# Patient Record
Sex: MALE | Race: WHITE | NOT HISPANIC OR LATINO | Employment: OTHER | URBAN - METROPOLITAN AREA
[De-identification: names, ages, dates, MRNs, and addresses within clinical notes are randomized per-mention and may not be internally consistent; named-entity substitution may affect disease eponyms.]

---

## 2017-01-05 ENCOUNTER — GENERIC CONVERSION - ENCOUNTER (OUTPATIENT)
Dept: OTHER | Facility: OTHER | Age: 47
End: 2017-01-05

## 2017-03-02 ENCOUNTER — ALLSCRIPTS OFFICE VISIT (OUTPATIENT)
Dept: OTHER | Facility: OTHER | Age: 47
End: 2017-03-02

## 2017-03-02 DIAGNOSIS — D72.819 DECREASED WHITE BLOOD CELL COUNT: ICD-10-CM

## 2017-03-02 DIAGNOSIS — E78.5 HYPERLIPIDEMIA: ICD-10-CM

## 2017-03-02 DIAGNOSIS — R22.1 LOCALIZED SWELLING, MASS OR LUMP OF NECK: ICD-10-CM

## 2017-03-02 DIAGNOSIS — I10 ESSENTIAL (PRIMARY) HYPERTENSION: ICD-10-CM

## 2017-03-21 ENCOUNTER — HOSPITAL ENCOUNTER (EMERGENCY)
Facility: HOSPITAL | Age: 47
Discharge: HOME | End: 2017-03-21
Attending: EMERGENCY MEDICINE
Payer: COMMERCIAL

## 2017-03-21 ENCOUNTER — ALLSCRIPTS OFFICE VISIT (OUTPATIENT)
Dept: OTHER | Facility: OTHER | Age: 47
End: 2017-03-21

## 2017-03-21 VITALS
WEIGHT: 240 LBS | DIASTOLIC BLOOD PRESSURE: 65 MMHG | SYSTOLIC BLOOD PRESSURE: 127 MMHG | TEMPERATURE: 98.4 F | OXYGEN SATURATION: 96 % | RESPIRATION RATE: 18 BRPM | HEART RATE: 76 BPM

## 2017-03-21 DIAGNOSIS — M62.838 MUSCLE SPASM: Primary | ICD-10-CM

## 2017-03-21 DIAGNOSIS — M79.2 RADICULAR PAIN IN RIGHT ARM: ICD-10-CM

## 2017-03-21 LAB
SPECIMEN SOURCE: NORMAL
TROPONIN I BLD-MCNC: 0 NG/ML (ref 0–0.08)

## 2017-03-21 PROCEDURE — 93005 ELECTROCARDIOGRAM TRACING: CPT | Performed by: EMERGENCY MEDICINE

## 2017-03-21 PROCEDURE — 84484 ASSAY OF TROPONIN QUANT: CPT

## 2017-03-21 PROCEDURE — 99284 EMERGENCY DEPT VISIT MOD MDM: CPT

## 2017-03-21 PROCEDURE — 96372 THER/PROPH/DIAG INJ SC/IM: CPT

## 2017-03-21 PROCEDURE — 93005 ELECTROCARDIOGRAM TRACING: CPT

## 2017-03-21 RX ORDER — KETOROLAC TROMETHAMINE 30 MG/ML
15 INJECTION, SOLUTION INTRAMUSCULAR; INTRAVENOUS ONCE
Status: COMPLETED | OUTPATIENT
Start: 2017-03-21 | End: 2017-03-21

## 2017-03-21 RX ORDER — LIDOCAINE 50 MG/G
1 PATCH TOPICAL EVERY 24 HOURS
Qty: 7 PATCH | Refills: 0 | Status: SHIPPED | OUTPATIENT
Start: 2017-03-21 | End: 2018-11-06 | Stop reason: ALTCHOICE

## 2017-03-21 RX ORDER — LOSARTAN POTASSIUM 25 MG/1
25 TABLET ORAL DAILY
COMMUNITY
End: 2018-03-24 | Stop reason: SDUPTHER

## 2017-03-21 RX ORDER — LIDOCAINE 50 MG/G
1 PATCH TOPICAL ONCE
Status: DISCONTINUED | OUTPATIENT
Start: 2017-03-21 | End: 2017-03-21 | Stop reason: HOSPADM

## 2017-03-21 RX ADMIN — HYDROMORPHONE HYDROCHLORIDE 1 MG: 1 INJECTION, SOLUTION INTRAMUSCULAR; INTRAVENOUS; SUBCUTANEOUS at 14:51

## 2017-03-21 RX ADMIN — LIDOCAINE 1 PATCH: 50 PATCH CUTANEOUS at 15:53

## 2017-03-21 RX ADMIN — KETOROLAC TROMETHAMINE 15 MG: 30 INJECTION, SOLUTION INTRAMUSCULAR at 14:50

## 2017-03-22 ENCOUNTER — GENERIC CONVERSION - ENCOUNTER (OUTPATIENT)
Dept: OTHER | Facility: OTHER | Age: 47
End: 2017-03-22

## 2017-03-22 LAB
ATRIAL RATE: 79 BPM
P AXIS: 58 DEGREES
PR INTERVAL: 154 MS
QRS AXIS: 24 DEGREES
QRSD INTERVAL: 114 MS
QT INTERVAL: 368 MS
QTC INTERVAL: 421 MS
T WAVE AXIS: 16 DEGREES
VENTRICULAR RATE: 79 BPM

## 2017-03-23 ENCOUNTER — GENERIC CONVERSION - ENCOUNTER (OUTPATIENT)
Dept: OTHER | Facility: OTHER | Age: 47
End: 2017-03-23

## 2017-05-12 ENCOUNTER — APPOINTMENT (OUTPATIENT)
Dept: LAB | Facility: HOSPITAL | Age: 47
End: 2017-05-12
Payer: COMMERCIAL

## 2017-05-12 ENCOUNTER — HOSPITAL ENCOUNTER (OUTPATIENT)
Dept: ULTRASOUND IMAGING | Facility: HOSPITAL | Age: 47
Discharge: HOME | End: 2017-05-12
Payer: COMMERCIAL

## 2017-05-12 ENCOUNTER — GENERIC CONVERSION - ENCOUNTER (OUTPATIENT)
Dept: OTHER | Facility: OTHER | Age: 47
End: 2017-05-12

## 2017-05-12 DIAGNOSIS — D72.819 DECREASED WHITE BLOOD CELL COUNT: ICD-10-CM

## 2017-05-12 DIAGNOSIS — R22.1 LOCALIZED SWELLING, MASS OR LUMP OF NECK: ICD-10-CM

## 2017-05-12 DIAGNOSIS — I10 ESSENTIAL (PRIMARY) HYPERTENSION: ICD-10-CM

## 2017-05-12 DIAGNOSIS — E78.5 HYPERLIPIDEMIA: ICD-10-CM

## 2017-05-12 LAB
ALBUMIN SERPL BCP-MCNC: 4.1 G/DL (ref 3.5–5)
ALP SERPL-CCNC: 60 U/L (ref 46–116)
ALT SERPL W P-5'-P-CCNC: 33 U/L (ref 12–78)
ANION GAP SERPL CALCULATED.3IONS-SCNC: 3 MMOL/L (ref 4–13)
AST SERPL W P-5'-P-CCNC: 19 U/L (ref 5–45)
BASOPHILS # BLD AUTO: 0.02 THOUSANDS/ΜL (ref 0–0.1)
BASOPHILS NFR BLD AUTO: 0 % (ref 0–1)
BILIRUB SERPL-MCNC: 0.38 MG/DL (ref 0.2–1)
BUN SERPL-MCNC: 12 MG/DL (ref 5–25)
CALCIUM SERPL-MCNC: 9 MG/DL (ref 8.3–10.1)
CHLORIDE SERPL-SCNC: 103 MMOL/L (ref 100–108)
CO2 SERPL-SCNC: 34 MMOL/L (ref 21–32)
CREAT SERPL-MCNC: 1.1 MG/DL (ref 0.6–1.3)
EOSINOPHIL # BLD AUTO: 0.08 THOUSAND/ΜL (ref 0–0.61)
EOSINOPHIL NFR BLD AUTO: 2 % (ref 0–6)
ERYTHROCYTE [DISTWIDTH] IN BLOOD BY AUTOMATED COUNT: 12.3 % (ref 11.6–15.1)
GFR SERPL CREATININE-BSD FRML MDRD: >60 ML/MIN/1.73SQ M
GLUCOSE P FAST SERPL-MCNC: 95 MG/DL (ref 65–99)
HCT VFR BLD AUTO: 44.5 % (ref 36.5–49.3)
HGB BLD-MCNC: 15.7 G/DL (ref 12–17)
LYMPHOCYTES # BLD AUTO: 1.74 THOUSANDS/ΜL (ref 0.6–4.47)
LYMPHOCYTES NFR BLD AUTO: 37 % (ref 14–44)
MCH RBC QN AUTO: 30.6 PG (ref 26.8–34.3)
MCHC RBC AUTO-ENTMCNC: 35.3 G/DL (ref 31.4–37.4)
MCV RBC AUTO: 87 FL (ref 82–98)
MONOCYTES # BLD AUTO: 0.41 THOUSAND/ΜL (ref 0.17–1.22)
MONOCYTES NFR BLD AUTO: 9 % (ref 4–12)
NEUTROPHILS # BLD AUTO: 2.52 THOUSANDS/ΜL (ref 1.85–7.62)
NEUTS SEG NFR BLD AUTO: 52 % (ref 43–75)
NRBC BLD AUTO-RTO: 0 /100 WBCS
PLATELET # BLD AUTO: 178 THOUSANDS/UL (ref 149–390)
PMV BLD AUTO: 10.9 FL (ref 8.9–12.7)
POTASSIUM SERPL-SCNC: 4.3 MMOL/L (ref 3.5–5.3)
PROT SERPL-MCNC: 7.4 G/DL (ref 6.4–8.2)
RBC # BLD AUTO: 5.13 MILLION/UL (ref 3.88–5.62)
SODIUM SERPL-SCNC: 140 MMOL/L (ref 136–145)
WBC # BLD AUTO: 4.77 THOUSAND/UL (ref 4.31–10.16)

## 2017-05-12 PROCEDURE — 80053 COMPREHEN METABOLIC PANEL: CPT

## 2017-05-12 PROCEDURE — 85025 COMPLETE CBC W/AUTO DIFF WBC: CPT

## 2017-05-12 PROCEDURE — 76536 US EXAM OF HEAD AND NECK: CPT

## 2017-05-12 PROCEDURE — 36415 COLL VENOUS BLD VENIPUNCTURE: CPT

## 2017-05-17 ENCOUNTER — GENERIC CONVERSION - ENCOUNTER (OUTPATIENT)
Dept: OTHER | Facility: OTHER | Age: 47
End: 2017-05-17

## 2017-05-19 ENCOUNTER — GENERIC CONVERSION - ENCOUNTER (OUTPATIENT)
Dept: OTHER | Facility: OTHER | Age: 47
End: 2017-05-19

## 2017-06-02 ENCOUNTER — GENERIC CONVERSION - ENCOUNTER (OUTPATIENT)
Dept: OTHER | Facility: OTHER | Age: 47
End: 2017-06-02

## 2017-06-05 ENCOUNTER — GENERIC CONVERSION - ENCOUNTER (OUTPATIENT)
Dept: OTHER | Facility: OTHER | Age: 47
End: 2017-06-05

## 2017-07-26 ENCOUNTER — GENERIC CONVERSION - ENCOUNTER (OUTPATIENT)
Dept: OTHER | Facility: OTHER | Age: 47
End: 2017-07-26

## 2017-12-21 ENCOUNTER — ALLSCRIPTS OFFICE VISIT (OUTPATIENT)
Dept: OTHER | Facility: OTHER | Age: 47
End: 2017-12-21

## 2017-12-21 DIAGNOSIS — Z12.5 ENCOUNTER FOR SCREENING FOR MALIGNANT NEOPLASM OF PROSTATE: ICD-10-CM

## 2017-12-21 DIAGNOSIS — G44.029 CHRONIC CLUSTER HEADACHE, NOT INTRACTABLE: ICD-10-CM

## 2017-12-21 DIAGNOSIS — I10 ESSENTIAL (PRIMARY) HYPERTENSION: ICD-10-CM

## 2017-12-21 DIAGNOSIS — R31.29 OTHER MICROSCOPIC HEMATURIA: ICD-10-CM

## 2017-12-21 DIAGNOSIS — E78.5 HYPERLIPIDEMIA: ICD-10-CM

## 2017-12-21 DIAGNOSIS — E34.9 ENDOCRINE DISORDER: ICD-10-CM

## 2017-12-23 NOTE — PROGRESS NOTES
Assessment   1  Chronic cluster headache, not intractable (339 02) (G44 029)    Plan   Chronic cluster headache, not intractable    · PredniSONE 20 MG Oral Tablet; Take 3 tablets in the morning  Chronic cluster headache, not intractable, Hyperlipidemia, Hypertension, Low    testosterone    · (1) CBC/PLT/DIFF; Status:Active; Requested for:57Fbz8511;    · (1) COMPREHENSIVE METABOLIC PANEL; Status:Active; Requested for:87Evj4938;    · (1) LIPID PANEL FASTING W DIRECT LDL REFLEX; Status:Active; Requested    for:77Vyt6048;    · (1) TSH; Status:Active; Requested for:45Rkh9360;     Discussion/Summary      1 : Cluster headache: Recommend prednisone burst, 20 mg, 3 pills daily for 5 days  Re-evaluate after that  If the headache continued, would recommend MRI, and further evaluation  Chief Complaint   C/o- Two nights ago pt states he was with spouse and felt some type of pressure on neck and really bad headache  [facility]      History of Present Illness   HPI: 48 y/o male presents c/o headache that started 2 days ago during intercourse  The pain started suddenly at the left base of head and radiates to the back of the left eye  It seems to be throbbing and waxing and waning in nature  He has never had anything like this before  Denies vision changes, n/v, weakness, numbness, tingling, paresthesia, loss of consciousness  Review of Systems        Constitutional: no fever or chills, feels well, no tiredness, no recent weight loss or weight gain  ENT: no complaints of earache, no loss of hearing, no nosebleeds or nasal discharge, no sore throat or hoarseness  Cardiovascular: no complaints of slow or fast heart rate, no chest pain, no palpitations, no leg claudication or lower extremity edema  Genitourinary: as noted in HPI  Musculoskeletal: no complaints of arthralgia, no myalgia, no joint swelling or stiffness, no limb pain or swelling  Neurological: as noted in HPI  Active Problems   1   Acute right-sided thoracic back pain (724 1) (M54 6)   2  Allergic rhinitis (477 9) (J30 9)   3  Atypical chest pain (786 59) (R07 89)   4  Blood in stool (578 1) (K92 1)   5  Cut of hand (882 0) (S61 409A)   6  Diarrhea (787 91) (R19 7)   7  Encounter for prostate cancer screening (V76 44) (Z12 5)   8  Erectile dysfunction of non-organic origin (302 72) (F52 21)   9  Ganglion cyst of wrist (727 41) (M67 439)   10  Hyperlipidemia (272 4) (E78 5)   11  Hypertension (401 9) (I10)   12  Intractable pain (780 96) (R52)   13  Leukopenia (288 50) (D72 819)   14  Low testosterone (259 9) (E34 9)   15  Lump in neck (784 2) (R22 1)   16  Lump of skin (782 2) (R22 9)   17  Need for prophylactic vaccination and inoculation against influenza (V04 81) (Z23)   18  Palpitations (785 1) (R00 2)   19  Paraspinal muscle spasm (724 8) (M62 830)   20  Pressure in chest (786 59) (R07 89)   21  Right arm numbness (782 0) (R20 2)   22  Screening for colon cancer (V76 51) (Z12 11)    Past Medical History   Active Problems And Past Medical History Reviewed: The active problems and past medical history were reviewed and updated today  Family History   Mother    1  Family history of Breast Cancer (V16 3)  Father    2  Family history of Arthritis (V17 7)   3  Family history of Colon Cancer (V16 0)   4  Family history of Coronary Artery Disease (V17 49)   5  Family history of Osteoporosis (V17 81)  Brother    10  Family history of Cerebral Palsy    Social History    · Being A Social Drinker   · Marital History - Currently    · Never a smoker   · Occupation:   · electronics recycling  The social history was reviewed and updated today  The social history was reviewed and is unchanged  Surgical History   1  History of Surgery Spermatic Cord For Varicocele    Current Meds      The medication list was reviewed and updated today  Allergies   1   Penicillins    Vitals    Recorded: 68Iji5775 03:56PM   Heart Rate 80, L Radial   Pulse Quality Normal, L Radial   Respiration 16   Systolic 596, LUE, Sitting   Diastolic 90, LUE, Sitting   Height 5 ft 11 5 in   Weight 245 lb 9 6 oz   BMI Calculated 33 78   BSA Calculated 2 31     Physical Exam        Constitutional      General appearance: No acute distress, well appearing and well nourished  Pulmonary      Respiratory effort: No increased work of breathing or signs of respiratory distress  Auscultation of lungs: Clear to auscultation, equal breath sounds bilaterally, no wheezes, no rales, no rhonci  Cardiovascular      Auscultation of heart: Normal rate and rhythm, normal S1 and S2, without murmurs  Carotid pulses: Normal        Musculoskeletal      Gait and station: Normal        Digits and nails: Normal without clubbing or cyanosis  Inspection/palpation of joints, bones, and muscles: Normal        Neurologic      Cranial nerves: Cranial nerves 2-12 intact  Reflexes: 2+ and symmetric  Sensation: No sensory loss  Future Appointments      Date/Time Provider Specialty Site   01/09/2018 03:30 PM Margaux Shaikh MD Urology [address]     Signatures    Electronically signed by :  ROSARIO Tanner ; Dec 22 2017  8:11AM EST                       (Author)

## 2018-01-08 ENCOUNTER — GENERIC CONVERSION - ENCOUNTER (OUTPATIENT)
Dept: FAMILY MEDICINE CLINIC | Facility: CLINIC | Age: 48
End: 2018-01-08

## 2018-01-11 NOTE — RESULT NOTES
Verified Results  (1) CBC/PLT/DIFF [id number] 09:01AM Clemencia Simple Order Number: [id number]     Test Name Result Flag Reference   WBC COUNT 4 77 Thousand/uL  4 31-10 16   RBC COUNT 5 13 Million/uL  3 88-5 62   HEMOGLOBIN 15 7 g/dL  12 0-17 0   HEMATOCRIT 44 5 %  36 5-49 3   MCV 87 fL  82-98   MCH 30 6 pg  26 8-34 3   MCHC 35 3 g/dL  31 4-37 4   RDW 12 3 %  11 6-15 1   MPV 10 9 fL  8 9-12 7   PLATELET COUNT 637 Thousands/uL  149-390   nRBC AUTOMATED 0 /100 WBCs     NEUTROPHILS RELATIVE PERCENT 52 %  43-75   LYMPHOCYTES RELATIVE PERCENT 37 %  14-44   MONOCYTES RELATIVE PERCENT 9 %  4-12   EOSINOPHILS RELATIVE PERCENT 2 %  0-6   BASOPHILS RELATIVE PERCENT 0 %  0-1   NEUTROPHILS ABSOLUTE COUNT 2 52 Thousands/? ??L  1 85-7 62   LYMPHOCYTES ABSOLUTE COUNT 1 74 Thousands/? ??L  0 60-4 47   MONOCYTES ABSOLUTE COUNT 0 41 Thousand/? ??L  0 17-1 22   EOSINOPHILS ABSOLUTE COUNT 0 08 Thousand/? ??L  0 00-0 61   BASOPHILS ABSOLUTE COUNT 0 02 Thousands/? ??L  0 00-0 10   - Patient Instructions: This bloodwork is non-fasting  Please drink two glasses of water morning of bloodwork       (1) COMPREHENSIVE METABOLIC PANEL [id number] 09:48VH Clemencia Simple Order Number: [id number]     Test Name Result Flag Reference   SODIUM 140 mmol/L  136-145   POTASSIUM 4 3 mmol/L  3 5-5 3   CHLORIDE 103 mmol/L  100-108   CARBON DIOXIDE 34 mmol/L H 21-32   ANION GAP (CALC) 3 mmol/L L 4-13   BLOOD UREA NITROGEN 12 mg/dL  5-25   CREATININE 1 10 mg/dL  0 60-1 30   Standardized to IDMS reference method   CALCIUM 9 0 mg/dL  8 3-10 1   BILI, TOTAL 0 38 mg/dL  0 20-1 00   ALK PHOSPHATAS 60 U/L     ALT (SGPT) 33 U/L  12-78   AST(SGOT) 19 U/L  5-45   ALBUMIN 4 1 g/dL  3 5-5 0   TOTAL PROTEIN 7 4 g/dL  6 4-8 2   eGFR Non-African American      >60 0 ml/min/1 73sq m   [place] [employer] Disease Education Program recommendations are as follows:  GFR calculation is accurate only with a steady state creatinine  Chronic Kidney disease less than 60 ml/min/1 73 sq  meters  Kidney failure less than 15 ml/min/1 73 sq  meters     GLUCOSE FASTING 95 mg/dL  65-99

## 2018-01-11 NOTE — RESULT NOTES
Verified Results  US THYROID [id number] 08:29AM Melany Iglesias Order Number: [id number]    - Patient Instructions: To schedule this appointment, please contact Central Scheduling at [phone]  Test Name Result Flag Reference   US THYROID (Report)     THYROID ULTRASOUND     INDICATION: Follow-up thyroid nodule  COMPARISON: Thyroid ultrasound 8/10/2016     TECHNIQUE:  Ultrasound of the thyroid was performed with a high frequency linear transducer in transverse and sagittal planes including volumetric imaging sweeps as well as traditional still imaging technique  FINDINGS:   Normal homogeneous smooth echotexture  Right gland: 2 2 x 5 2 x 1 7 cm  Within the lower pole is a stable 1 x 0 6 x 1 cm cystic nodule with a few internal echoes  This has a low suspicion pattern does not meet criteria for biopsy at this time  Left gland: 1 8 x 4 8 x 1 6 cm  There is a 7 mm colloid cyst at the lower pole left lobe of the thyroid stable from prior exam          Isthmus: The isthmus is 0 2 cm in AP dimension  IMPRESSION:      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

## 2018-01-12 NOTE — MISCELLANEOUS
Message   Recorded as Task   Date: 03/22/2017 04:56 PM, Created By: Jason Knutson   Task Name: Follow Up   Assigned To: Jarod and [address] Team   Regarding Patient: Reji Morejon, Status: In Progress   Kristen Arteaga - 22 Mar 2017 4:56 PM     TASK CREATED  Caller: Self; General Medical Question; [phone] (Home); [phone] (Work)  Concepción Pope sent him to the ER last night for his back  They gave him Percoset and he was told to take 2 so now he only has 4 left  He is taking them every 4 hours  He has an appt  with Ortho  [facility] on Friday  Please call him at [phone]  the pharmacy is [facility] on [address] [place] [address] [facility] 22 Mar 2017 5:36 PM     TASK REASSIGNED: Previously Assigned To Jarod and Mary Kate,Clinical Team  Has to wait for BT Lj Costello - 22 Mar 2017 8:22 PM     TASK REPLIED TO: Previously Assigned To Aminata matt medical info to [facility] Wed  He should call them for an rashad't  Daisy Barnes - 23 Mar 2017 9:49 AM     TASK EDITED  left message on wifes cell phone to call back regarding this   the number we have for him defaults to a work like vm   Daisy Lopes - 23 Mar 2017 9:49 AM     TASK IN PROGRESS   Nils Farfan - 23 Mar 2017 10:31 AM     TASK EDITED  I rec'd a call back from pt's wife, she states she went to [facility]) ER and they gave Percocet and sent pt home without any testing, per BT we will refill Percocet for 10 days and OV with Pain management is 3/30/17, [facility] 3/24/17  Hopefully either office will be able to make dx and give pain control  Active Problems    1  Acute right-sided thoracic back pain (724 1) (M54 6)   2  Allergic rhinitis (477 9) (J30 9)   3  Atypical chest pain (786 59) (R07 89)   4  Blood in stool (578 1) (K92 1)   5  Cut of hand (882 0) (S61 409A)   6  Diarrhea (787 91) (R19 7)   7  Encounter for prostate cancer screening (V76 44) (Z12 5)   8   Erectile dysfunction of non-organic origin (302 88) (F52 21)   9  Ganglion cyst of wrist (727 41) (M67 439)   10  Hyperlipidemia (272 4) (E78 5)   11  Hypertension (401 9) (I10)   12  Intractable pain (780 96) (R52)   13  Leukopenia (288 50) (D72 819)   14  Low testosterone (257 2) (E29 1)   15  Lump in neck (784 2) (R22 1)   16  Lump of skin (782 2) (R22 9)   17  Need for prophylactic vaccination and inoculation against influenza (V04 81) (Z23)   18  Palpitations (785 1) (R00 2)   19  Paraspinal muscle spasm (724 8) (M62 830)   20  Pressure in chest (786 59) (R07 89)   21  Right arm numbness (782 0) (R20 2)   22  Screening for colon cancer (V76 51) (Z12 11)    Current Meds   1  Losartan Potassium 25 MG Oral Tablet; TAKE 1 TABLET DAILY; Therapy: 46HKM1127 to [phone])  Requested for: 95DOC3685; Last   Rx:02Mar2017 Ordered    Allergies    1  Penicillins    Plan  Acute right-sided thoracic back pain, Intractable pain, Paraspinal muscle spasm    · Oxycodone-Acetaminophen 5-325 MG Oral Tablet; TAKE 1 to 2 TABLETs  BY  MOUTH EVERY 4-6 HOURS AS NEEDED FOR PAIN   Do not exceed 10 tablets/day    Signatures   Electronically signed by : Siddhartha Castellon, ; Mar 23 2017 10:32AM EST                       (Author)

## 2018-01-14 VITALS
HEIGHT: 72 IN | BODY MASS INDEX: 32.4 KG/M2 | SYSTOLIC BLOOD PRESSURE: 128 MMHG | WEIGHT: 239.25 LBS | DIASTOLIC BLOOD PRESSURE: 72 MMHG | HEART RATE: 76 BPM

## 2018-01-16 ENCOUNTER — ALLSCRIPTS OFFICE VISIT (OUTPATIENT)
Dept: OTHER | Facility: OTHER | Age: 48
End: 2018-01-16

## 2018-01-16 NOTE — RESULT NOTES
Verified Results  (1) TSH [id number] 10:34AM Kena Littlejohn     Test Name Result Flag Reference   TSH 0 818 uIU/mL  0 358-3 740   - Patient Instructions: This bloodwork is non-fasting  Please drink two glasses of water morning of bloodwork  - Patient Instructions: This is a fasting blood test  Water,black tea or black  coffee only after 9:00pm the night before test Drink 2 glasses of water the morning of test - Patient Instructions: This bloodwork is non-fasting  Please drink two glasses of   water morning of bloodwork  Patients undergoing fluorescein dye angiography may retain small amounts of fluorescein in the body for 48-72 hours post procedure  Samples containing fluorescein can produce falsely depressed TSH values  If the patient had this procedure,a specimen should be resubmitted post fluorescein clearance

## 2018-01-23 VITALS
WEIGHT: 245.6 LBS | RESPIRATION RATE: 16 BRPM | HEART RATE: 80 BPM | DIASTOLIC BLOOD PRESSURE: 90 MMHG | HEIGHT: 72 IN | SYSTOLIC BLOOD PRESSURE: 142 MMHG | BODY MASS INDEX: 33.26 KG/M2

## 2018-01-24 VITALS
BODY MASS INDEX: 34.58 KG/M2 | WEIGHT: 247 LBS | DIASTOLIC BLOOD PRESSURE: 82 MMHG | HEIGHT: 71 IN | SYSTOLIC BLOOD PRESSURE: 144 MMHG

## 2018-03-24 DIAGNOSIS — I10 ESSENTIAL HYPERTENSION: Primary | ICD-10-CM

## 2018-03-25 RX ORDER — LOSARTAN POTASSIUM 25 MG/1
TABLET ORAL
Qty: 90 TABLET | Refills: 0 | Status: SHIPPED | OUTPATIENT
Start: 2018-03-25 | End: 2018-06-30 | Stop reason: SDUPTHER

## 2018-06-30 DIAGNOSIS — I10 ESSENTIAL HYPERTENSION: ICD-10-CM

## 2018-07-02 RX ORDER — LOSARTAN POTASSIUM 25 MG/1
TABLET ORAL
Qty: 90 TABLET | Refills: 0 | Status: SHIPPED | OUTPATIENT
Start: 2018-07-02 | End: 2018-09-28 | Stop reason: SDUPTHER

## 2018-08-31 LAB — HBA1C MFR BLD HPLC: 5.3 %

## 2018-09-28 DIAGNOSIS — I10 ESSENTIAL HYPERTENSION: ICD-10-CM

## 2018-09-28 PROBLEM — N45.1 EPIDIDYMITIS: Status: ACTIVE | Noted: 2018-01-04

## 2018-09-28 PROBLEM — G44.029 CHRONIC CLUSTER HEADACHE, NOT INTRACTABLE: Status: ACTIVE | Noted: 2017-12-21

## 2018-09-28 PROBLEM — R52 INTRACTABLE PAIN: Status: ACTIVE | Noted: 2017-03-21

## 2018-09-28 PROBLEM — D72.819 LEUKOPENIA: Status: ACTIVE | Noted: 2017-03-02

## 2018-09-28 PROBLEM — R31.29 HEMATURIA, MICROSCOPIC: Status: ACTIVE | Noted: 2018-01-16

## 2018-09-28 RX ORDER — LOSARTAN POTASSIUM 25 MG/1
TABLET ORAL
Qty: 30 TABLET | Refills: 0 | Status: SHIPPED | OUTPATIENT
Start: 2018-09-28 | End: 2018-11-06 | Stop reason: SDUPTHER

## 2018-09-28 NOTE — TELEPHONE ENCOUNTER
I sent in the 30 day supply of his blood pressure medication    Patient needs to make appointment within the month for future prescriptions

## 2018-10-28 DIAGNOSIS — I10 ESSENTIAL HYPERTENSION: ICD-10-CM

## 2018-10-29 RX ORDER — LOSARTAN POTASSIUM 25 MG/1
TABLET ORAL
Qty: 30 TABLET | Refills: 0 | OUTPATIENT
Start: 2018-10-29

## 2018-11-06 ENCOUNTER — OFFICE VISIT (OUTPATIENT)
Dept: FAMILY MEDICINE CLINIC | Facility: CLINIC | Age: 48
End: 2018-11-06
Payer: COMMERCIAL

## 2018-11-06 VITALS
BODY MASS INDEX: 34.72 KG/M2 | HEIGHT: 71 IN | SYSTOLIC BLOOD PRESSURE: 140 MMHG | HEART RATE: 70 BPM | WEIGHT: 248 LBS | DIASTOLIC BLOOD PRESSURE: 88 MMHG

## 2018-11-06 DIAGNOSIS — E78.2 MIXED HYPERLIPIDEMIA: Primary | ICD-10-CM

## 2018-11-06 DIAGNOSIS — Z98.890 STATUS POST COLONOSCOPY: ICD-10-CM

## 2018-11-06 DIAGNOSIS — I10 ESSENTIAL HYPERTENSION: ICD-10-CM

## 2018-11-06 PROBLEM — M54.12 CERVICAL RADICULOPATHY: Status: ACTIVE | Noted: 2017-06-02

## 2018-11-06 PROBLEM — M25.579 PAIN IN JOINT INVOLVING ANKLE AND FOOT: Status: ACTIVE | Noted: 2018-11-06

## 2018-11-06 PROBLEM — M47.812 CERVICAL SPONDYLOSIS WITHOUT MYELOPATHY: Status: ACTIVE | Noted: 2017-06-02

## 2018-11-06 PROBLEM — M72.2 PLANTAR FASCIITIS: Status: ACTIVE | Noted: 2018-11-06

## 2018-11-06 PROBLEM — M25.529 PAIN IN ELBOW: Status: ACTIVE | Noted: 2018-11-06

## 2018-11-06 PROBLEM — M77.10 LATERAL EPICONDYLITIS: Status: ACTIVE | Noted: 2018-11-06

## 2018-11-06 PROCEDURE — 3008F BODY MASS INDEX DOCD: CPT | Performed by: FAMILY MEDICINE

## 2018-11-06 PROCEDURE — 99214 OFFICE O/P EST MOD 30 MIN: CPT | Performed by: FAMILY MEDICINE

## 2018-11-06 PROCEDURE — 1036F TOBACCO NON-USER: CPT | Performed by: FAMILY MEDICINE

## 2018-11-06 RX ORDER — LOSARTAN POTASSIUM 25 MG/1
25 TABLET ORAL DAILY
Qty: 90 TABLET | Refills: 0 | Status: SHIPPED | OUTPATIENT
Start: 2018-11-06 | End: 2019-02-01 | Stop reason: SDUPTHER

## 2018-11-06 RX ORDER — LOSARTAN POTASSIUM 25 MG/1
25 TABLET ORAL DAILY
Qty: 90 TABLET | Refills: 1 | Status: SHIPPED | OUTPATIENT
Start: 2018-11-06 | End: 2018-11-06 | Stop reason: SDUPTHER

## 2018-11-06 RX ORDER — BIOTIN 5 MG
TABLET ORAL
COMMUNITY

## 2018-11-06 RX ORDER — B-COMPLEX WITH VITAMIN C
TABLET ORAL
COMMUNITY
End: 2020-03-03 | Stop reason: SDUPTHER

## 2018-11-06 RX ORDER — TADALAFIL 5 MG/1
TABLET ORAL
COMMUNITY
End: 2020-07-08 | Stop reason: ALTCHOICE

## 2018-11-06 NOTE — PROGRESS NOTES
Assessment/Plan:    Hyperlipidemia  His total cholesterol was 193, his triglycerides went up from 115 to 183, his HDL is 44 and his LDL came down from 126 to 112  I suggested a low intensity statin however he wants to see his cardiologist and get her opinion  He does have a family history of CAD  Hypertension  His blood pressure today is 140/88 but he has been out of his blood pressure medication  His losartan was renewed today  He will return in 1 week to recheck his blood pressure  Diagnoses and all orders for this visit:    Mixed hyperlipidemia  -     Ambulatory referral to Cardiology; Future    Essential hypertension  -     Discontinue: losartan (COZAAR) 25 mg tablet; Take 1 tablet (25 mg total) by mouth daily  -     losartan (COZAAR) 25 mg tablet; Take 1 tablet (25 mg total) by mouth daily  -     Ambulatory referral to Cardiology; Future    Status post colonoscopy    Other orders  -     tadalafil (CIALIS) 5 MG tablet; Take by mouth  -     CoenzymeQ10-Isoleucine-Glycine (CO Q-10) 100-50-25 MG TB24; Co Q-10  -     Glucosamine Sulfate (SYNOVACIN PO); Glucosamine  -     Krill Oil 1000 MG CAPS; krill oil  -     Multiple Vitamins-Minerals (MULTIVITAMIN ADULT EXTRA C PO); one daily  -     Protein 500 MG CHEW; protein          Subjective: Follow up ED, hyperlipidemia, HTN  Review BW results  Flu immunization declined  -  [facility]     Patient ID: Rehana Garcia is a 49 y o  male  This is a 44-year-old male who comes in after not being seen for several years  He is feeling well however, his blood pressure is elevated at 1 40/88 he has been out of his blood pressure medication  He takes losartan 25 mg daily  Reviewing his labs, his glucose came down from 95 to 85, his cholesterol numbers are mildly elevated with the total cholesterol 193 and his triglycerides going up from 115 to 183, HDL is 44 and his LDL is 112   He has not seen his cardiologist for a while and will make an appointment to see   Eve Bell  He does have a family history of CAD with his mother getting 3 stents  His CBC was normal         The following portions of the patient's history were reviewed and updated as appropriate: allergies, current medications, past family history, past medical history, past social history, past surgical history and problem list     Review of Systems   Constitutional: Negative  HENT: Negative  Eyes: Negative  Respiratory: Negative  Cardiovascular: Negative  Negative for chest pain  Gastrointestinal: Negative  Endocrine: Negative  Genitourinary: Negative  Musculoskeletal: Negative  Skin: Negative  Allergic/Immunologic: Negative  Neurological: Negative  Hematological: Negative  Psychiatric/Behavioral: Negative  Objective:      /88 (BP Location: Left arm, Patient Position: Sitting, Cuff Size: Large)   Pulse 70   Ht 5' 11" (1 803 m)   Wt 112 kg (248 lb)   BMI 34 59 kg/m²          Physical Exam   Constitutional: He is oriented to person, place, and time  He appears well-developed and well-nourished  HENT:   Head: Normocephalic  Right Ear: External ear normal    Left Ear: External ear normal    Mouth/Throat: Oropharynx is clear and moist    Eyes: Pupils are equal, round, and reactive to light  Conjunctivae and EOM are normal    Neck: Normal range of motion  Neck supple  Cardiovascular: Normal rate, regular rhythm and normal heart sounds  Pulmonary/Chest: Effort normal and breath sounds normal    Abdominal: Soft  Bowel sounds are normal    Musculoskeletal: Normal range of motion  Neurological: He is alert and oriented to person, place, and time  Skin: Skin is warm  Psychiatric: He has a normal mood and affect  His behavior is normal  Judgment and thought content normal    Nursing note and vitals reviewed

## 2018-11-06 NOTE — ASSESSMENT & PLAN NOTE
His blood pressure today is 140/88 but he has been out of his blood pressure medication  His losartan was renewed today  He will return in 1 week to recheck his blood pressure

## 2019-01-11 ENCOUNTER — APPOINTMENT (OUTPATIENT)
Dept: LAB | Facility: MEDICAL CENTER | Age: 49
End: 2019-01-11
Attending: UROLOGY
Payer: COMMERCIAL

## 2019-01-11 DIAGNOSIS — Z12.5 ENCOUNTER FOR SCREENING FOR MALIGNANT NEOPLASM OF PROSTATE: ICD-10-CM

## 2019-01-11 DIAGNOSIS — R31.29 OTHER MICROSCOPIC HEMATURIA: ICD-10-CM

## 2019-01-11 LAB
BACTERIA UR QL AUTO: ABNORMAL /HPF
BILIRUB UR QL STRIP: NEGATIVE
CLARITY UR: CLEAR
COLOR UR: YELLOW
GLUCOSE UR STRIP-MCNC: NEGATIVE MG/DL
HGB UR QL STRIP.AUTO: NEGATIVE
HYALINE CASTS #/AREA URNS LPF: ABNORMAL /LPF
KETONES UR STRIP-MCNC: NEGATIVE MG/DL
LEUKOCYTE ESTERASE UR QL STRIP: NEGATIVE
NITRITE UR QL STRIP: NEGATIVE
NON-SQ EPI CELLS URNS QL MICRO: ABNORMAL /HPF
PH UR STRIP.AUTO: 6 [PH] (ref 4.5–8)
PROT UR STRIP-MCNC: ABNORMAL MG/DL
PSA SERPL-MCNC: 0.7 NG/ML (ref 0–4)
RBC #/AREA URNS AUTO: ABNORMAL /HPF
SP GR UR STRIP.AUTO: 1.03 (ref 1–1.03)
UROBILINOGEN UR QL STRIP.AUTO: 0.2 E.U./DL
WBC #/AREA URNS AUTO: ABNORMAL /HPF

## 2019-01-11 PROCEDURE — 81001 URINALYSIS AUTO W/SCOPE: CPT

## 2019-01-11 PROCEDURE — G0103 PSA SCREENING: HCPCS

## 2019-01-11 PROCEDURE — 36415 COLL VENOUS BLD VENIPUNCTURE: CPT

## 2019-01-11 RX ORDER — BIOTIN 5 MG
1 TABLET ORAL
COMMUNITY
End: 2019-01-15 | Stop reason: SDUPTHER

## 2019-01-15 ENCOUNTER — OFFICE VISIT (OUTPATIENT)
Dept: UROLOGY | Facility: MEDICAL CENTER | Age: 49
End: 2019-01-15
Payer: COMMERCIAL

## 2019-01-15 VITALS
BODY MASS INDEX: 35 KG/M2 | HEIGHT: 71 IN | SYSTOLIC BLOOD PRESSURE: 150 MMHG | HEART RATE: 87 BPM | DIASTOLIC BLOOD PRESSURE: 84 MMHG | WEIGHT: 250 LBS

## 2019-01-15 DIAGNOSIS — N52.8 OTHER MALE ERECTILE DYSFUNCTION: ICD-10-CM

## 2019-01-15 DIAGNOSIS — N40.0 BPH WITHOUT OBSTRUCTION/LOWER URINARY TRACT SYMPTOMS: Primary | ICD-10-CM

## 2019-01-15 DIAGNOSIS — Z12.5 ENCOUNTER FOR PROSTATE CANCER SCREENING: ICD-10-CM

## 2019-01-15 LAB
SL AMB  POCT GLUCOSE, UA: NORMAL
SL AMB LEUKOCYTE ESTERASE,UA: NORMAL
SL AMB POCT BILIRUBIN,UA: NORMAL
SL AMB POCT BLOOD,UA: NORMAL
SL AMB POCT CLARITY,UA: CLEAR
SL AMB POCT COLOR,UA: YELLOW
SL AMB POCT KETONES,UA: NORMAL
SL AMB POCT NITRITE,UA: NORMAL
SL AMB POCT PH,UA: 6
SL AMB POCT SPECIFIC GRAVITY,UA: 1.01
SL AMB POCT URINE PROTEIN: NORMAL
SL AMB POCT UROBILINOGEN: 0.2

## 2019-01-15 PROCEDURE — 99214 OFFICE O/P EST MOD 30 MIN: CPT | Performed by: UROLOGY

## 2019-01-15 PROCEDURE — 81003 URINALYSIS AUTO W/O SCOPE: CPT | Performed by: UROLOGY

## 2019-01-15 RX ORDER — SILDENAFIL 50 MG/1
50 TABLET, FILM COATED ORAL DAILY PRN
Qty: 10 TABLET | Refills: 11 | Status: SHIPPED | OUTPATIENT
Start: 2019-01-15 | End: 2020-03-03 | Stop reason: SDUPTHER

## 2019-01-15 NOTE — LETTER
January 15, 2019     Billie Greenwood PA-C  [address] [place] [address] [email or web address]    Patient: Benjamín Middleton   YOB: 1970   Date of Visit: 1/15/2019       Dear Dr Rafaela Soto:    Thank you for referring Cody Manning to me for evaluation  Below are my notes for this consultation  If you have questions, please do not hesitate to call me  I look forward to following your patient along with you  Sincerely,        Severa Chol, MD        CC: No Recipients  Severa Chol, MD  1/15/2019  4:34 PM  Sign at close encounter  [address] [facility]  [address], [address] [place] [address]  [phone]  www  [email or web address]  [email or web address]      NAME: Benjamín Middleton  AGE: 51 y o  SEX: male  : 1970   MRN: [id number]    DATE: 1/15/2019  TIME: 4:05 PM    Assessment and Plan:  Erectile dysfunction with decreased ejaculate:  I recommend trying sildenafil 50 mg and this was prescribed electronically  He may wish to file through his insurance for Viagra and we will accommodate that request if he calls in  BPH with obstruction:  No treatment at this time due to possible sexual side effects  Encounter for prostate cancer screening:  PSA is low and normal at 0 7  Follow-up 1 year with PSA  Chief Complaint     Chief Complaint   Patient presents with    Benign Prostatic Hypertrophy       History of Present Illness   74-year-old man known to me for the past for epididymitis, chronic prostatitis, erectile dysfunction history of prostate nodule  I saw him 1 year ago and he had a tender right caput epididymis  He had a urinalysis showing some blood on the dipstick so microscopy did was done which showed no red blood cells  Today's urinalysis is negative  PSA is normal at 0 7  He still complains of decreased volume of ejaculate  He can achieve an erection, he tries not to use the PD 5 inhibitors and has not used them for 9 months    He is able to penetrate  He complains of nocturia sometimes 5-6 times per night  He also is having some more daytime frequency  He is drinking quite a bit of water and he is working out quite a bit  The following portions of the patient's history were reviewed and updated as appropriate: allergies, current medications, past family history, past medical history, past social history, past surgical history and problem list     Review of Systems   Review of Systems    Active Problem List     Patient Active Problem List   Diagnosis    Allergic rhinitis    Chronic cluster headache, not intractable    Epididymitis    Erectile dysfunction of non-organic origin    Hematuria, microscopic    Hyperlipidemia    Hypertension    Intractable pain    Leukopenia    Palpitations    Cervical radiculopathy    Cervical spondylosis without myelopathy    Pain in joint involving ankle and foot    Lateral epicondylitis    Pain in elbow    Plantar fasciitis    Status post colonoscopy       Objective   /84 (BP Location: Left arm, Patient Position: Sitting, Cuff Size: Adult)   Pulse 87   Ht 5' 11" (1 803 m)   Wt 113 kg (250 lb)   BMI 34 87 kg/m²      Physical Exam   Constitutional: He is oriented to person, place, and time  He appears well-developed and well-nourished  HENT:   Head: Normocephalic and atraumatic  Eyes: EOM are normal    Neck: Normal range of motion  Pulmonary/Chest: Effort normal    Musculoskeletal: Normal range of motion  Neurological: He is alert and oriented to person, place, and time  Skin: Skin is warm and dry  Psychiatric: He has a normal mood and affect   His behavior is normal  Judgment and thought content normal            Current Medications     Current Outpatient Prescriptions:     CoenzymeQ10-Isoleucine-Glycine (CO Q-10) 100-50-25 MG TB24, Co Q-10, Disp: , Rfl:     Glucosamine Sulfate (SYNOVACIN PO), Glucosamine, Disp: , Rfl:     Krill Oil 1000 MG CAPS, krill oil, Disp: , Rfl:    losartan (COZAAR) 25 mg tablet, Take 1 tablet (25 mg total) by mouth daily, Disp: 90 tablet, Rfl: 0    Misc Natural Products (GLUCOSAMINE CHOND COMPLEX/MSM PO), Take 1 tablet by mouth, Disp: , Rfl:     Multiple Vitamins-Minerals (MULTIVITAMIN ADULT EXTRA C PO), one daily, Disp: , Rfl:     Nutritional Supplements (PROTEIN SUPPLEMENT 80% PO), daily  , Disp: , Rfl:     Protein 500 MG CHEW, protein, Disp: , Rfl:     tadalafil (CIALIS) 5 MG tablet, Take by mouth, Disp: , Rfl:         Kavin Gonzales MD

## 2019-01-15 NOTE — PROGRESS NOTES
[address] [facility]  [facility]  [address] [facility]  [place], [address] [facility]  [phone]  www  [facility]  org      NAME: Wade Hunter  AGE: 51 y o  SEX: male  : 1970   MRN: [id number]    DATE: 1/15/2019  TIME: 4:05 PM    Assessment and Plan:  Erectile dysfunction with decreased ejaculate:  I recommend trying sildenafil 50 mg and this was prescribed electronically  He may wish to file through his insurance for Viagra and we will accommodate that request if he calls in  BPH with obstruction:  No treatment at this time due to possible sexual side effects  Encounter for prostate cancer screening:  PSA is low and normal at 0 7  Follow-up 1 year with PSA  Chief Complaint     Chief Complaint   Patient presents with    Benign Prostatic Hypertrophy       History of Present Illness   58-year-old man known to me for the past for epididymitis, chronic prostatitis, erectile dysfunction history of prostate nodule  I saw him 1 year ago and he had a tender right caput epididymis  He had a urinalysis showing some blood on the dipstick so microscopy did was done which showed no red blood cells  Today's urinalysis is negative  PSA is normal at 0 7  He still complains of decreased volume of ejaculate  He can achieve an erection, he tries not to use the PD 5 inhibitors and has not used them for 9 months  He is able to penetrate  He complains of nocturia sometimes 5-6 times per night  He also is having some more daytime frequency  He is drinking quite a bit of water and he is working out quite a bit    The following portions of the patient's history were reviewed and updated as appropriate: allergies, current medications, past family history, past medical history, past social history, past surgical history and problem list     Review of Systems   Review of Systems    Active Problem List     Patient Active Problem List   Diagnosis    Allergic rhinitis    Chronic cluster headache, not intractable    Epididymitis    Erectile dysfunction of non-organic origin    Hematuria, microscopic    Hyperlipidemia    Hypertension    Intractable pain    Leukopenia    Palpitations    Cervical radiculopathy    Cervical spondylosis without myelopathy    Pain in joint involving ankle and foot    Lateral epicondylitis    Pain in elbow    Plantar fasciitis    Status post colonoscopy       Objective   /84 (BP Location: Left arm, Patient Position: Sitting, Cuff Size: Adult)   Pulse 87   Ht 5' 11" (1 803 m)   Wt 113 kg (250 lb)   BMI 34 87 kg/m²     Physical Exam   Constitutional: He is oriented to person, place, and time  He appears well-developed and well-nourished  HENT:   Head: Normocephalic and atraumatic  Eyes: EOM are normal    Neck: Normal range of motion  Pulmonary/Chest: Effort normal    Musculoskeletal: Normal range of motion  Neurological: He is alert and oriented to person, place, and time  Skin: Skin is warm and dry  Psychiatric: He has a normal mood and affect  His behavior is normal  Judgment and thought content normal            Current Medications     Current Outpatient Prescriptions:     CoenzymeQ10-Isoleucine-Glycine (CO Q-10) 100-50-25 MG TB24, Co Q-10, Disp: , Rfl:     Glucosamine Sulfate (SYNOVACIN PO), Glucosamine, Disp: , Rfl:     Krill Oil 1000 MG CAPS, krill oil, Disp: , Rfl:     losartan (COZAAR) 25 mg tablet, Take 1 tablet (25 mg total) by mouth daily, Disp: 90 tablet, Rfl: 0    Misc Natural Products (GLUCOSAMINE CHOND COMPLEX/MSM PO), Take 1 tablet by mouth, Disp: , Rfl:     Multiple Vitamins-Minerals (MULTIVITAMIN ADULT EXTRA C PO), one daily, Disp: , Rfl:     Nutritional Supplements (PROTEIN SUPPLEMENT 80% PO), daily  , Disp: , Rfl:     Protein 500 MG CHEW, protein, Disp: , Rfl:     tadalafil (CIALIS) 5 MG tablet, Take by mouth, Disp: , Rfl:         Noemi Cogan, MD

## 2019-02-01 ENCOUNTER — TELEPHONE (OUTPATIENT)
Dept: FAMILY MEDICINE CLINIC | Facility: CLINIC | Age: 49
End: 2019-02-01

## 2019-02-01 DIAGNOSIS — I10 ESSENTIAL HYPERTENSION: Primary | ICD-10-CM

## 2019-02-01 RX ORDER — LOSARTAN POTASSIUM 25 MG/1
25 TABLET ORAL DAILY
Qty: 30 TABLET | Refills: 0 | Status: SHIPPED | OUTPATIENT
Start: 2019-02-01 | End: 2020-07-08 | Stop reason: SDUPTHER

## 2019-02-01 NOTE — TELEPHONE ENCOUNTER
I called patient to inform him that BT prescribed a 30 day supply of [facility] [place]  I told patient that BT saw he went to see the cardiologist but he will want to see him within this month  Patient did not understand why he needed to come back for an OV and I said that it was to discuss potential med changes and the test that was performed @ cardiology  Patient did NOT want to schedule OV at the time of the call and wanted to call cardiology first to see what the issue was and then he said that he was going to call us back later to schedule appt

## 2019-02-01 NOTE — TELEPHONE ENCOUNTER
The patient has not been seen since November when he was restarted on the losartan and he was to return in 1 week for a blood pressure check for which he was a no-show  He did go to Cardiology and the cardiologist talk to him about starting a statin however he wanted to do diet and exercise  He does have a family history of CAD  The patient was called to tell him that we are going to fill it for 30 days and he is to make an appointment so that we can recheck the cholesterol numbers  The cardiologist was unable to start the statin and the patient does not recall talking to the cardiologist about the statin  He is going to call the cardiologist to see if he wanted to start a statin  He did not make an appointment in this office

## 2019-02-01 NOTE — TELEPHONE ENCOUNTER
Patient called and said he only has two pills left for his Losartan  [facility] [address] [facility] [place]  He also said he went to the cardiologist and wanted you to know this as well  Thank you!

## 2019-03-15 ENCOUNTER — OFFICE VISIT (OUTPATIENT)
Dept: URGENT CARE | Age: 49
End: 2019-03-15
Payer: COMMERCIAL

## 2019-03-15 VITALS
DIASTOLIC BLOOD PRESSURE: 65 MMHG | HEART RATE: 80 BPM | RESPIRATION RATE: 16 BRPM | HEIGHT: 71 IN | WEIGHT: 240 LBS | SYSTOLIC BLOOD PRESSURE: 134 MMHG | OXYGEN SATURATION: 96 % | BODY MASS INDEX: 33.6 KG/M2

## 2019-03-15 DIAGNOSIS — S01.111A LACERATION OF RIGHT PERIOCULAR AREA WITHOUT FOREIGN BODY, INITIAL ENCOUNTER: Primary | ICD-10-CM

## 2019-03-15 PROCEDURE — 99213 OFFICE O/P EST LOW 20 MIN: CPT | Performed by: PHYSICIAN ASSISTANT

## 2019-03-15 NOTE — PROGRESS NOTES
[address] [employer] [facility]        NAME: Kiley Ocampo is a 51 y o  male  : 1970    MRN: [id number]  DATE: March 15, 2019  TIME: 7:29 PM    Assessment and Plan   Laceration of right periocular area without foreign body, initial encounter [M82 185O]  1  Laceration of right periocular area without foreign body, initial encounter           Patient Instructions       Take medications as directed  Drink plenty of fluids  Follow up with family doctor this week  Go to ER immediately if new or worsening symptoms occur  Chief Complaint     Chief Complaint   Patient presents with    Laceration     Pt c/o lacteration above right eye  Pt was working on a car today and accidently hit himself in head with a wrench  Last 2015  History of Present Illness       Laceration    The incident occurred less than 1 hour ago  The laceration is located on the face  The laceration is 1 cm in size  The laceration mechanism was a blunt object (Working on car, hit in head with wrench)  The patient is experiencing no pain  He reports no foreign bodies present  His tetanus status is UTD  Review of Systems   Review of Systems   Constitutional: Negative for chills, diaphoresis, fatigue and fever  HENT: Negative for congestion, postnasal drip, sinus pressure, sore throat and trouble swallowing  Eyes: Negative  Respiratory: Negative for chest tightness, shortness of breath and wheezing  Cardiovascular: Negative  Negative for chest pain and palpitations  Gastrointestinal: Negative for abdominal pain, diarrhea, nausea and vomiting  Endocrine: Negative  Genitourinary: Negative for dysuria  Musculoskeletal: Negative  Negative for back pain and neck pain  Skin: Positive for wound  Negative for pallor and rash  Allergic/Immunologic: Negative  Neurological: Negative  Negative for dizziness, syncope, speech difficulty, weakness, light-headedness, numbness and headaches     Hematological: Negative  Psychiatric/Behavioral: Negative  Current Medications       Current Outpatient Medications:     CoenzymeQ10-Isoleucine-Glycine (CO Q-10) 100-50-25 MG TB24, Co Q-10, Disp: , Rfl:     Glucosamine Sulfate (SYNOVACIN PO), Glucosamine, Disp: , Rfl:     Krill Oil 1000 MG CAPS, krill oil, Disp: , Rfl:     losartan (COZAAR) 25 mg tablet, Take 1 tablet (25 mg total) by mouth daily, Disp: 30 tablet, Rfl: 0    Misc Natural Products (GLUCOSAMINE CHOND COMPLEX/MSM PO), Take 1 tablet by mouth, Disp: , Rfl:     Multiple Vitamins-Minerals (MULTIVITAMIN ADULT EXTRA C PO), one daily, Disp: , Rfl:     Nutritional Supplements (PROTEIN SUPPLEMENT 80% PO), daily  , Disp: , Rfl:     Protein 500 MG CHEW, protein, Disp: , Rfl:     sildenafil (VIAGRA) 50 MG tablet, Take 1 tablet (50 mg total) by mouth daily as needed for erectile dysfunction, Disp: 10 tablet, Rfl: 11    tadalafil (CIALIS) 5 MG tablet, Take by mouth, Disp: , Rfl:     Current Allergies     Allergies as of 03/15/2019 - Reviewed 03/15/2019   Allergen Reaction Noted    Penicillins  03/21/2017            The following portions of the patient's history were reviewed and updated as appropriate: allergies, current medications, past family history, past medical history, past social history, past surgical history and problem list      Past Medical History:   Diagnosis Date    Benign prostatic hyperplasia without lower urinary tract symptoms     Chronic prostatitis     Erectile dysfunction     Hypertension     Testicular hypogonadism        Past Surgical History:   Procedure Laterality Date    CYSTOSCOPY      Diagnostic Bladder    HERNIA REPAIR      SURGERY SCROTAL / TESTICULAR      Spermatic Cord for Varicocele- per [employer]       Family History   Problem Relation Age of Onset    Breast cancer Mother     Arthritis Father     Colon cancer Father     Coronary artery disease Father     Nephrolithiasis Father     Lung cancer Father     Testicular cancer Father     Osteoporosis Father     Cerebral palsy Brother          Medications have been verified  Objective   /65   Pulse 80   Resp 16   Ht 5' 11" (1 803 m)   Wt 109 kg (240 lb)   SpO2 96%   BMI 33 47 kg/m²        Physical Exam     Physical Exam   Constitutional: He is oriented to person, place, and time  He appears well-developed and well-nourished  No distress  HENT:   Head: Normocephalic and atraumatic  Right Ear: External ear normal    Left Ear: External ear normal    Nose: Nose normal    Mouth/Throat: Oropharynx is clear and moist  No oropharyngeal exudate  Eyes: Pupils are equal, round, and reactive to light  Conjunctivae and EOM are normal  Right eye exhibits no discharge  Left eye exhibits no discharge  Neck: Normal range of motion  Neck supple  Cardiovascular: Normal rate, regular rhythm, normal heart sounds and intact distal pulses  Pulmonary/Chest: Effort normal and breath sounds normal  No respiratory distress  He has no wheezes  He has no rales  Musculoskeletal: He exhibits no deformity  Lymphadenopathy:     He has no cervical adenopathy  Neurological: He is alert and oriented to person, place, and time  He has normal strength  He displays no atrophy and no tremor  No cranial nerve deficit or sensory deficit  He exhibits normal muscle tone  He displays a negative Romberg sign  He displays no seizure activity  Coordination and gait normal    Skin: Skin is warm  Capillary refill takes less than 2 seconds  No rash noted  He is not diaphoretic  No pallor  Nursing note and vitals reviewed

## 2019-03-15 NOTE — PATIENT INSTRUCTIONS
Laceration   WHAT YOU NEED TO KNOW:   A laceration is an injury to the skin and the soft tissue underneath it  Lacerations happen when you are cut or hit by something  They can happen anywhere on the body  DISCHARGE INSTRUCTIONS:   Return to the emergency department if:   · You have heavy bleeding or bleeding that does not stop after 10 minutes of holding firm, direct pressure over the wound  · Your wound opens up  Contact your healthcare provider if:   · You have a fever or chills  · Your laceration is red, warm, or swollen  · You have red streaks on your skin coming from your wound  · You have white or yellow drainage from the wound that smells bad  · You have pain that gets worse, even after treatment  · You have questions or concerns about your condition or care  Medicines:   · Prescription pain medicine  may be given  Ask how to take this medicine safely  · Antibiotics  help treat or prevent a bacterial infection  · Take your medicine as directed  Contact your healthcare provider if you think your medicine is not helping or if you have side effects  Tell him or her if you are allergic to any medicine  Keep a list of the medicines, vitamins, and herbs you take  Include the amounts, and when and why you take them  Bring the list or the pill bottles to follow-up visits  Carry your medicine list with you in case of an emergency  Care for your wound as directed:   · Do not get your wound wet  until your healthcare provider says it is okay  Do not soak your wound in water  Do not go swimming until your healthcare provider says it is okay  Carefully wash the wound with soap and water  Gently pat the area dry or allow it to air dry  · Change your bandages  when they get wet, dirty, or after washing  Apply new, clean bandages as directed  Do not apply elastic bandages or tape too tight  Do not put powders or lotions over your incision  · Apply antibiotic ointment as directed  Your healthcare provider may give you antibiotic ointment to put over your wound if you have stitches  If you have strips of tape over your incision, let them dry up and fall off on their own  If they do not fall off within 14 days, gently remove them  If you have glue over your wound, do not remove or pick at it  If your glue comes off, do not replace it with glue that you have at home  · Check your wound every day for signs of infection such as swelling, redness, or pus  Self-care:   · Apply ice  on your wound for 15 to 20 minutes every hour or as directed  Use an ice pack, or put crushed ice in a plastic bag  Cover it with a towel  Ice helps prevent tissue damage and decreases swelling and pain  · Use a splint as directed  A splint will decrease movement and stress on your wound  It may help it heal faster  A splint may be used for lacerations over joints or areas of your body that bend  Ask your healthcare provider how to apply and remove a splint  · Decrease scarring of your wound  by applying ointments as directed  Do not apply ointments until your healthcare provider says it is okay  You may need to wait until your wound is healed  Ask which ointment to buy and how often to use it  After your wound is healed, use sunscreen over the area when you are out in the sun  You should do this for at least 6 months to 1 year after your injury  Follow up with your healthcare provider as directed: You may need to follow up in 24 to 48 hours to have your wound checked for infection  You will need to return in 3 to 14 days if you have stitches or staples so they can be removed  Care for your wound as directed to prevent infection and help it heal  Write down your questions so you remember to ask them during your visits  © 2017 [employer] Information is for End User's use only and may not be sold, redistributed or otherwise used for commercial purposes   All illustrations and images included in [facility] are the copyrighted property of A D [employer] M [employer] Inc  or [employer]  The above information is an  only  It is not intended as medical advice for individual conditions or treatments  Talk to your doctor, nurse or pharmacist before following any medical regimen to see if it is safe and effective for you

## 2019-05-01 DIAGNOSIS — I10 ESSENTIAL HYPERTENSION: ICD-10-CM

## 2019-05-01 RX ORDER — LOSARTAN POTASSIUM 25 MG/1
TABLET ORAL
Qty: 90 TABLET | Refills: 1 | Status: SHIPPED | OUTPATIENT
Start: 2019-05-01 | End: 2020-10-22 | Stop reason: SDUPTHER

## 2019-08-29 LAB — HBA1C MFR BLD HPLC: 5.6 %

## 2020-02-25 DIAGNOSIS — N40.0 BPH WITHOUT OBSTRUCTION/LOWER URINARY TRACT SYMPTOMS: Primary | ICD-10-CM

## 2020-03-03 ENCOUNTER — OFFICE VISIT (OUTPATIENT)
Dept: UROLOGY | Facility: MEDICAL CENTER | Age: 50
End: 2020-03-03
Payer: COMMERCIAL

## 2020-03-03 VITALS
HEIGHT: 71 IN | WEIGHT: 240 LBS | BODY MASS INDEX: 33.6 KG/M2 | DIASTOLIC BLOOD PRESSURE: 64 MMHG | SYSTOLIC BLOOD PRESSURE: 136 MMHG | HEART RATE: 73 BPM

## 2020-03-03 DIAGNOSIS — M62.89 PELVIC FLOOR DYSFUNCTION: Primary | ICD-10-CM

## 2020-03-03 DIAGNOSIS — N52.8 OTHER MALE ERECTILE DYSFUNCTION: ICD-10-CM

## 2020-03-03 PROCEDURE — 3008F BODY MASS INDEX DOCD: CPT | Performed by: UROLOGY

## 2020-03-03 PROCEDURE — 3078F DIAST BP <80 MM HG: CPT | Performed by: UROLOGY

## 2020-03-03 PROCEDURE — 1036F TOBACCO NON-USER: CPT | Performed by: UROLOGY

## 2020-03-03 PROCEDURE — 99214 OFFICE O/P EST MOD 30 MIN: CPT | Performed by: UROLOGY

## 2020-03-03 PROCEDURE — 3075F SYST BP GE 130 - 139MM HG: CPT | Performed by: UROLOGY

## 2020-03-03 RX ORDER — SILDENAFIL 50 MG/1
50 TABLET, FILM COATED ORAL DAILY PRN
Qty: 10 TABLET | Refills: 11 | Status: SHIPPED | OUTPATIENT
Start: 2020-03-03 | End: 2022-01-04

## 2020-03-03 NOTE — PROGRESS NOTES
[address] [facility]  [facility]  [address] [facility], [address] [place] [address]  [phone]  www  [facility]  org      NAME: Junette Rubinstein  AGE: 47 y o  SEX: male  : 1970   MRN: [id number]    DATE: 3/3/2020  TIME: 4:32 PM    Assessment and Plan:    Pelvic floor dysfunction:   Referred to physical therapy for pelvic health  History of hypogonadism with extreme fatigue and sexual dysfunction- we will check another testosterone and if his testosterone is in the low-normal range, I will probably go ahead and replace it to see how he does  His fatigue is quite bothersome and it interferes with his work and his home life  BPH with obstruction:  Many of these symptoms may be due to pelvic floor dysfunction  Erectile dysfunction:  I recent the prescription for Viagra, but we will see how things go with everything else  Encounter for prostate cancer screening:  Check PSA along with the testosterone  I will let him know the results  Otherwise f/u 1 year  Chief Complaint     Chief Complaint   Patient presents with    Erectile Dysfunction    Benign Prostatic Hypertrophy       History of Present Illness   Follow-up for erectile dysfunction with decreased ejaculate:  Last office visit I recommended trying sildenafil 50 mg and this was prescribed electronically  He never took it  Cialis gives a headache  BPH with obstruction:  We opted for no treatment at last office visit due to possible sexual side effects  He is doing okay in this regard  His urine stream is generally weak  Some of this may be due to pelvic floor dysfunction which also interferes with his orgasm and causes him to have a feeling of tightness and pain around the prostatic region  We discussed this and I believe that he would be a good candidate for physical therapy for pelvic floor dysfunction      Encounter for prostate cancer screening:  PSA last year was 0 7, nothing recent    hypogonadism:  When he was on the gel in the past, he felt very good on it  He has been feeling very fatigued lately especially after sexual intercourse  Last testosterone was low normal at 293 with bioavailable 178  The following portions of the patient's history were reviewed and updated as appropriate: allergies, current medications, past family history, past medical history, past social history, past surgical history and problem list   Past Medical History:   Diagnosis Date    Benign prostatic hyperplasia without lower urinary tract symptoms     Chronic prostatitis     Erectile dysfunction     Hypertension     Testicular hypogonadism      Past Surgical History:   Procedure Laterality Date    CYSTOSCOPY      Diagnostic Bladder    HERNIA REPAIR      SURGERY SCROTAL / TESTICULAR      Spermatic Cord for Varicocele- per [employer]     shoulder  Review of Systems   Review of Systems   Constitutional: Positive for fatigue  Genitourinary: Negative  Active Problem List     Patient Active Problem List   Diagnosis    Allergic rhinitis    Chronic cluster headache, not intractable    Epididymitis    Erectile dysfunction of non-organic origin    Hematuria, microscopic    Hyperlipidemia    Hypertension    Intractable pain    Leukopenia    Palpitations    Cervical radiculopathy    Cervical spondylosis without myelopathy    Pain in joint involving ankle and foot    Lateral epicondylitis    Pain in elbow    Plantar fasciitis    Status post colonoscopy       Objective   /64 (BP Location: Left arm, Patient Position: Sitting, Cuff Size: Adult)   Pulse 73   Ht 5' 11" (1 803 m)   Wt 109 kg (240 lb)   BMI 33 47 kg/m²     Physical Exam   Constitutional: He is oriented to person, place, and time  He appears well-developed and well-nourished  HENT:   Head: Normocephalic and atraumatic  Eyes: EOM are normal    Neck: Normal range of motion     Pulmonary/Chest: Effort normal  Genitourinary: Rectum normal and prostate normal    Genitourinary Comments: Rectal exam reveals normal tone but he had some pain in the sphincteric muscle, the prostate is about 40 g, mostly smooth in you can feel the central structures quite prominently  No nodules  Nothing malignant  Musculoskeletal: Normal range of motion  Neurological: He is alert and oriented to person, place, and time  Skin: Skin is warm and dry  Psychiatric: He has a normal mood and affect  His behavior is normal  Judgment and thought content normal            Current Medications     Current Outpatient Medications:     Glucosamine Sulfate (SYNOVACIN PO), Glucosamine, Disp: , Rfl:     Krill Oil 1000 MG CAPS, krill oil, Disp: , Rfl:     losartan (COZAAR) 25 mg tablet, Take 1 tablet (25 mg total) by mouth daily, Disp: 30 tablet, Rfl: 0    losartan (COZAAR) 25 mg tablet, TAKE 1 TABLET BY MOUTH EVERY DAY, Disp: 90 tablet, Rfl: 1    Multiple Vitamins-Minerals (MULTIVITAMIN ADULT EXTRA C PO), one daily, Disp: , Rfl:     Nutritional Supplements (PROTEIN SUPPLEMENT 80% PO), daily  , Disp: , Rfl:     CoenzymeQ10-Isoleucine-Glycine (CO Q-10) 100-50-25 MG TB24, Co Q-10, Disp: , Rfl:     Misc Natural Products (GLUCOSAMINE CHOND COMPLEX/MSM PO), Take 1 tablet by mouth, Disp: , Rfl:     sildenafil (VIAGRA) 50 MG tablet, Take 1 tablet (50 mg total) by mouth daily as needed for erectile dysfunction, Disp: 10 tablet, Rfl: 11    tadalafil (CIALIS) 5 MG tablet, Take by mouth, Disp: , Rfl:         Bj Coley MD

## 2020-07-07 ENCOUNTER — TELEPHONE (OUTPATIENT)
Dept: UROLOGY | Facility: MEDICAL CENTER | Age: 50
End: 2020-07-07

## 2020-07-07 NOTE — TELEPHONE ENCOUNTER
Patient of Dr Filemon Lewis seen at [facility] discomfort, aching, burning, and pressure  Looking for same day appointment      Patient can be reached at [phone]

## 2020-07-07 NOTE — TELEPHONE ENCOUNTER
Pt with constant penile pain 5 to 7 level with "squeezing in prostate area" coming in for appt tomorrow at 11:45 am with Dr Nicolas Lew  Denies fever; inability to urinate or any recent trauma to area  Last seen by Dr Gerhardt Carbon on 3/3/20: Assessment was Pelvic floor dysfunction; History of hypogonadism w/fatigue and sexual dysfunction; BPH with obstruction and erectile dysfunction  Has FU appt with Dr Gerhardt Carbon on 3/16/21

## 2020-07-07 NOTE — TELEPHONE ENCOUNTER
Patient calling to follow  Pain is described as 7 and would like to know how to proceed  Would like an appointment in the morning

## 2020-07-08 ENCOUNTER — OFFICE VISIT (OUTPATIENT)
Dept: UROLOGY | Facility: MEDICAL CENTER | Age: 50
End: 2020-07-08
Payer: COMMERCIAL

## 2020-07-08 VITALS
SYSTOLIC BLOOD PRESSURE: 130 MMHG | BODY MASS INDEX: 33.88 KG/M2 | DIASTOLIC BLOOD PRESSURE: 80 MMHG | HEIGHT: 71 IN | HEART RATE: 70 BPM | WEIGHT: 242 LBS

## 2020-07-08 DIAGNOSIS — N41.0 ACUTE PROSTATITIS: Primary | ICD-10-CM

## 2020-07-08 PROCEDURE — 3008F BODY MASS INDEX DOCD: CPT | Performed by: UROLOGY

## 2020-07-08 PROCEDURE — 99214 OFFICE O/P EST MOD 30 MIN: CPT | Performed by: UROLOGY

## 2020-07-08 PROCEDURE — 3075F SYST BP GE 130 - 139MM HG: CPT | Performed by: UROLOGY

## 2020-07-08 PROCEDURE — 3079F DIAST BP 80-89 MM HG: CPT | Performed by: UROLOGY

## 2020-07-08 PROCEDURE — 1036F TOBACCO NON-USER: CPT | Performed by: UROLOGY

## 2020-07-08 RX ORDER — SULFAMETHOXAZOLE AND TRIMETHOPRIM 800; 160 MG/1; MG/1
1 TABLET ORAL EVERY 12 HOURS SCHEDULED
Qty: 20 TABLET | Refills: 0 | Status: SHIPPED | OUTPATIENT
Start: 2020-07-08 | End: 2020-07-18

## 2020-07-08 NOTE — PROGRESS NOTES
HISTORY:    Onset two days ago of dysuria, pelvic pain and pressure, pain in region  No fevers chills  Never got the testosterone or PSA as requested         ASSESSMENT / PLAN:       Possible prostatitis, his urine is clear, but his symptom complex is consistent with that    Bactrim double-strength twice per day for 10 days    He feels that overall his stream is not been good for some months, aside from this episode of prostatitis  He has a cysto some years ago, perhaps that bears repeating  Follow-up in 2-3 months  He will get the blood test one month from now    The following portions of the patient's history were reviewed and updated as appropriate: allergies, current medications, past family history, past medical history, past social history, past surgical history and problem list     Review of Systems   All other systems reviewed and are negative  Objective:     Physical Exam   Constitutional: He is oriented to person, place, and time  He appears well-developed and well-nourished  No distress  HENT:   Head: Normocephalic and atraumatic  Eyes: Conjunctivae are normal    Cardiovascular: Normal rate and regular rhythm  Pulmonary/Chest: Effort normal and breath sounds normal  No respiratory distress  He has no wheezes  Abdominal: Soft  Bowel sounds are normal  He exhibits no distension and no mass  There is no tenderness  Genitourinary:   Genitourinary Comments: Penis testes normal    Prostate mildly tender, slightly irregular and enlarged, no nodules   Neurological: He is alert and oriented to person, place, and time  Skin: Skin is warm and dry   He is not diaphoretic          0   Lab Value Date/Time    PSA 0 7 01/11/2019 0911    PSA 0 7 08/10/2016 1034   ]  BUN   Date Value Ref Range Status   05/12/2017 12 5 - 25 mg/dL Final     Creatinine   Date Value Ref Range Status   05/12/2017 1 10 0 60 - 1 30 mg/dL Final     Comment:     Standardized to IDMS reference method     No components found for: CBC      Patient Active Problem List   Diagnosis    Allergic rhinitis    Chronic cluster headache, not intractable    Epididymitis    Erectile dysfunction of non-organic origin    Hematuria, microscopic    Hyperlipidemia    Hypertension    Intractable pain    Leukopenia    Palpitations    Cervical radiculopathy    Cervical spondylosis without myelopathy    Pain in joint involving ankle and foot    Lateral epicondylitis    Pain in elbow    Plantar fasciitis    Status post colonoscopy    Acute prostatitis        Diagnoses and all orders for this visit:    Acute prostatitis  -     sulfamethoxazole-trimethoprim (BACTRIM DS) 800-160 mg per tablet; Take 1 tablet by mouth every 12 (twelve) hours for 10 days           Patient ID: Odean Hodgkins is a 47 y o  male  Current Outpatient Medications:     Glucosamine Sulfate (SYNOVACIN PO), Glucosamine, Disp: , Rfl:     Krill Oil 1000 MG CAPS, krill oil, Disp: , Rfl:     losartan (COZAAR) 25 mg tablet, TAKE 1 TABLET BY MOUTH EVERY DAY, Disp: 90 tablet, Rfl: 1    Multiple Vitamins-Minerals (MULTIVITAMIN ADULT EXTRA C PO), one daily, Disp: , Rfl:     Nutritional Supplements (PROTEIN SUPPLEMENT 80% PO), daily  , Disp: , Rfl:     sildenafil (VIAGRA) 50 MG tablet, Take 1 tablet (50 mg total) by mouth daily as needed for erectile dysfunction (Patient not taking: Reported on 7/8/2020), Disp: 10 tablet, Rfl: 11    sulfamethoxazole-trimethoprim (BACTRIM DS) 800-160 mg per tablet, Take 1 tablet by mouth every 12 (twelve) hours for 10 days, Disp: 20 tablet, Rfl: 0    Past Medical History:   Diagnosis Date    Benign prostatic hyperplasia without lower urinary tract symptoms     Chronic prostatitis     Erectile dysfunction     Hypertension     Testicular hypogonadism        Past Surgical History:   Procedure Laterality Date    CYSTOSCOPY      Diagnostic Bladder    HERNIA REPAIR      SURGERY SCROTAL / TESTICULAR      Spermatic Cord for Varicocele- per [employer]       Social History

## 2020-09-23 ENCOUNTER — OFFICE VISIT (OUTPATIENT)
Dept: FAMILY MEDICINE CLINIC | Facility: CLINIC | Age: 50
End: 2020-09-23
Payer: COMMERCIAL

## 2020-09-23 VITALS
BODY MASS INDEX: 33.18 KG/M2 | HEIGHT: 71 IN | HEART RATE: 96 BPM | TEMPERATURE: 97.8 F | DIASTOLIC BLOOD PRESSURE: 82 MMHG | WEIGHT: 237 LBS | SYSTOLIC BLOOD PRESSURE: 130 MMHG

## 2020-09-23 DIAGNOSIS — J30.1 SEASONAL ALLERGIC RHINITIS DUE TO POLLEN: ICD-10-CM

## 2020-09-23 DIAGNOSIS — E78.2 MIXED HYPERLIPIDEMIA: ICD-10-CM

## 2020-09-23 DIAGNOSIS — R10.9 RIGHT FLANK PAIN: ICD-10-CM

## 2020-09-23 DIAGNOSIS — I10 ESSENTIAL HYPERTENSION: ICD-10-CM

## 2020-09-23 DIAGNOSIS — R35.0 FREQUENT URINATION: Primary | ICD-10-CM

## 2020-09-23 PROBLEM — M23.305 DERANGEMENT OF MEDIAL MENISCUS: Status: ACTIVE | Noted: 2019-04-09

## 2020-09-23 PROBLEM — Z98.890 STATUS POST COLONOSCOPY: Status: RESOLVED | Noted: 2018-11-06 | Resolved: 2020-09-23

## 2020-09-23 PROBLEM — M84.369A STRESS FRACTURE OF TIBIA: Status: ACTIVE | Noted: 2019-04-09

## 2020-09-23 PROBLEM — S86.119A: Status: ACTIVE | Noted: 2019-04-09

## 2020-09-23 PROBLEM — M25.469 KNEE JOINT EFFUSION: Status: ACTIVE | Noted: 2019-04-09

## 2020-09-23 LAB
SL AMB  POCT GLUCOSE, UA: NORMAL
SL AMB LEUKOCYTE ESTERASE,UA: NORMAL
SL AMB POCT BILIRUBIN,UA: NORMAL
SL AMB POCT BLOOD,UA: NORMAL
SL AMB POCT CLARITY,UA: CLEAR
SL AMB POCT COLOR,UA: YELLOW
SL AMB POCT KETONES,UA: 15
SL AMB POCT NITRITE,UA: NORMAL
SL AMB POCT PH,UA: 5.5
SL AMB POCT SPECIFIC GRAVITY,UA: >=1.03
SL AMB POCT URINE PROTEIN: NORMAL
SL AMB POCT UROBILINOGEN: 0.2

## 2020-09-23 PROCEDURE — 81002 URINALYSIS NONAUTO W/O SCOPE: CPT | Performed by: FAMILY MEDICINE

## 2020-09-23 PROCEDURE — 99214 OFFICE O/P EST MOD 30 MIN: CPT | Performed by: FAMILY MEDICINE

## 2020-09-23 PROCEDURE — 3725F SCREEN DEPRESSION PERFORMED: CPT | Performed by: FAMILY MEDICINE

## 2020-09-23 PROCEDURE — 3079F DIAST BP 80-89 MM HG: CPT | Performed by: FAMILY MEDICINE

## 2020-09-23 NOTE — ASSESSMENT & PLAN NOTE
Patient does have right flank pain  Question cause  When he takes a deep breath, it is worse  It is not classic for renal colic either  Check chest x-ray, then consider CT

## 2020-09-23 NOTE — PROGRESS NOTES
Assessment and Plan:    Problem List Items Addressed This Visit     Allergic rhinitis     Minimal problems currently  Follow-up as scheduled  Hyperlipidemia     Patient does have a history of high cholesterol  Currently on Krill oil  Would recommend check labs  Last appear to be from 2018  Ordered  Relevant Orders    Lipid Panel with Direct LDL reflex    Comprehensive metabolic panel    Hypertension     Blood pressure looks good  Continue on Cozaar 25 mg  Relevant Orders    Comprehensive metabolic panel    CBC and differential    TSH, 3rd generation    Right flank pain     Patient does have right flank pain  Question cause  When he takes a deep breath, it is worse  It is not classic for renal colic either  Check chest x-ray, then consider CT  Relevant Orders    XR chest pa & lateral    Comprehensive metabolic panel    CBC and differential      Other Visit Diagnoses     Frequent urination    -  Primary    Relevant Orders    POCT urine dip (Completed)    XR chest pa & lateral                 Diagnoses and all orders for this visit:    Frequent urination  -     POCT urine dip  -     XR chest pa & lateral; Future    Right flank pain  -     XR chest pa & lateral; Future  -     Comprehensive metabolic panel; Future  -     CBC and differential; Future    Essential hypertension  -     Comprehensive metabolic panel; Future  -     CBC and differential; Future  -     TSH, 3rd generation; Future    Mixed hyperlipidemia  -     Lipid Panel with Direct LDL reflex; Future  -     Comprehensive metabolic panel; Future    Seasonal allergic rhinitis due to pollen              Subjective:      Patient ID: Weston Barahona is a 47 y o  male  CC:    Chief Complaint   Patient presents with    Flank Pain     c/o right flank pain/mid back pain today  Also, frequent and urgent urination  mjs       HPI:    Patient is having discomfort on his right back  He feels is near the kidney    Feels like the areas bruised  Does not come and go  Has been there all day  He did leave a urine sample today  His back has been a problem for a while, but the pain that he is currently having started mostly today  Patient was recently at [facility] on [address] for blood draw  Unfortunately, he was told that "they do not do that blood work here "  Also, he was told that he had it done in February, and it would not be a valid prescription  On reviewing the chart, the patient had the order placed in February, and it was not done  There is no notation that he had been to the draw site, and that there was any problem with it  Patient did report that he is having frequency of urination, and some difficulty with stopping stream urine  Has seen urology for this  Unfortunately, please see above  Lab was not drawing his PSA at  Patient does have history of hypertension  Currently on Cozaar  Hyperlipidemia:  Currently on Krill oil, but no prescriptions for his cholesterol  Has not had a drawn recently  The following portions of the patient's history were reviewed and updated as appropriate: allergies, current medications, past family history, past medical history, past social history, past surgical history and problem list       Review of Systems   Constitutional: Negative  HENT: Negative  Eyes: Negative  Respiratory: Negative  Cardiovascular: Negative  Gastrointestinal: Negative  Endocrine: Negative  Genitourinary: Negative  Musculoskeletal: Positive for back pain  Skin: Negative  Allergic/Immunologic: Negative  Neurological: Negative  Hematological: Negative  Psychiatric/Behavioral: Negative  Data to review:       Objective:    Vitals:    09/23/20 1531   BP: 130/82   Pulse: 96   Temp: 97 8 °F (36 6 °C)   Weight: 108 kg (237 lb)   Height: 5' 11" (1 803 m)        Physical Exam  Vitals signs and nursing note reviewed     Constitutional:       Appearance: Normal appearance  Neck:      Vascular: No carotid bruit  Cardiovascular:      Rate and Rhythm: Normal rate and regular rhythm  Pulses: Normal pulses  Carotid pulses are 2+ on the right side and 2+ on the left side  Heart sounds: Normal heart sounds  No murmur  No gallop  Pulmonary:      Effort: Pulmonary effort is normal  No respiratory distress  Breath sounds: Normal breath sounds  No stridor  No wheezing, rhonchi or rales  Chest:      Chest wall: No tenderness  Neurological:      Mental Status: He is alert  BMI Counseling: Body mass index is 33 05 kg/m²  The BMI is above normal  Nutrition recommendations include decreasing portion sizes, encouraging healthy choices of fruits and vegetables, decreasing fast food intake, consuming healthier snacks, limiting drinks that contain sugar, moderation in carbohydrate intake, increasing intake of lean protein, reducing intake of saturated and trans fat and reducing intake of cholesterol  Exercise recommendations include exercising 3-5 times per week  No pharmacotherapy was ordered

## 2020-09-23 NOTE — ASSESSMENT & PLAN NOTE
Patient does have a history of high cholesterol  Currently on Krill oil  Would recommend check labs  Last appear to be from 2018  Ordered

## 2020-09-23 NOTE — PATIENT INSTRUCTIONS
Problem List Items Addressed This Visit     Allergic rhinitis     Minimal problems currently  Follow-up as scheduled  Hyperlipidemia     Patient does have a history of high cholesterol  Currently on Krill oil  Would recommend check labs  Last appear to be from 2018  Ordered  Relevant Orders    Lipid Panel with Direct LDL reflex    Comprehensive metabolic panel    Hypertension     Blood pressure looks good  Continue on Cozaar 25 mg  Relevant Orders    Comprehensive metabolic panel    CBC and differential    TSH, 3rd generation    Right flank pain     Patient does have right flank pain  Question cause  When he takes a deep breath, it is worse  It is not classic for renal colic either  Check chest x-ray, then consider CT           Relevant Orders    XR chest pa & lateral    Comprehensive metabolic panel    CBC and differential      Other Visit Diagnoses     Frequent urination    -  Primary    Relevant Orders    POCT urine dip (Completed)    XR chest pa & lateral

## 2020-09-28 ENCOUNTER — APPOINTMENT (OUTPATIENT)
Dept: RADIOLOGY | Facility: MEDICAL CENTER | Age: 50
End: 2020-09-28
Payer: COMMERCIAL

## 2020-09-28 ENCOUNTER — APPOINTMENT (OUTPATIENT)
Dept: LAB | Facility: MEDICAL CENTER | Age: 50
End: 2020-09-28
Attending: UROLOGY
Payer: COMMERCIAL

## 2020-09-28 DIAGNOSIS — R35.0 FREQUENT URINATION: ICD-10-CM

## 2020-09-28 DIAGNOSIS — N40.0 BPH WITHOUT OBSTRUCTION/LOWER URINARY TRACT SYMPTOMS: ICD-10-CM

## 2020-09-28 DIAGNOSIS — R10.9 RIGHT FLANK PAIN: ICD-10-CM

## 2020-09-28 LAB — PSA SERPL-MCNC: 0.7 NG/ML (ref 0–4)

## 2020-09-28 PROCEDURE — 71046 X-RAY EXAM CHEST 2 VIEWS: CPT

## 2020-09-28 PROCEDURE — 84153 ASSAY OF PSA TOTAL: CPT

## 2020-09-29 ENCOUNTER — APPOINTMENT (OUTPATIENT)
Dept: LAB | Facility: MEDICAL CENTER | Age: 50
End: 2020-09-29
Attending: UROLOGY
Payer: COMMERCIAL

## 2020-09-29 ENCOUNTER — OFFICE VISIT (OUTPATIENT)
Dept: UROLOGY | Facility: MEDICAL CENTER | Age: 50
End: 2020-09-29
Payer: COMMERCIAL

## 2020-09-29 VITALS
TEMPERATURE: 97.3 F | SYSTOLIC BLOOD PRESSURE: 140 MMHG | WEIGHT: 237 LBS | HEIGHT: 71 IN | HEART RATE: 63 BPM | BODY MASS INDEX: 33.18 KG/M2 | DIASTOLIC BLOOD PRESSURE: 90 MMHG

## 2020-09-29 DIAGNOSIS — N52.8 OTHER MALE ERECTILE DYSFUNCTION: ICD-10-CM

## 2020-09-29 DIAGNOSIS — M62.89 PELVIC FLOOR DYSFUNCTION: ICD-10-CM

## 2020-09-29 DIAGNOSIS — R10.9 RIGHT FLANK PAIN: ICD-10-CM

## 2020-09-29 DIAGNOSIS — I10 ESSENTIAL HYPERTENSION: ICD-10-CM

## 2020-09-29 DIAGNOSIS — E78.2 MIXED HYPERLIPIDEMIA: ICD-10-CM

## 2020-09-29 DIAGNOSIS — N41.0 ACUTE PROSTATITIS: Primary | ICD-10-CM

## 2020-09-29 DIAGNOSIS — N40.0 BPH WITHOUT OBSTRUCTION/LOWER URINARY TRACT SYMPTOMS: ICD-10-CM

## 2020-09-29 LAB
ALBUMIN SERPL BCP-MCNC: 4 G/DL (ref 3.5–5)
ALP SERPL-CCNC: 55 U/L (ref 46–116)
ALT SERPL W P-5'-P-CCNC: 42 U/L (ref 12–78)
ANION GAP SERPL CALCULATED.3IONS-SCNC: 4 MMOL/L (ref 4–13)
AST SERPL W P-5'-P-CCNC: 17 U/L (ref 5–45)
BASOPHILS # BLD AUTO: 0.04 THOUSANDS/ΜL (ref 0–0.1)
BASOPHILS NFR BLD AUTO: 1 % (ref 0–1)
BILIRUB SERPL-MCNC: 0.56 MG/DL (ref 0.2–1)
BUN SERPL-MCNC: 21 MG/DL (ref 5–25)
CALCIUM SERPL-MCNC: 9.1 MG/DL (ref 8.3–10.1)
CHLORIDE SERPL-SCNC: 108 MMOL/L (ref 100–108)
CHOLEST SERPL-MCNC: 222 MG/DL (ref 50–200)
CO2 SERPL-SCNC: 29 MMOL/L (ref 21–32)
CREAT SERPL-MCNC: 1.23 MG/DL (ref 0.6–1.3)
EOSINOPHIL # BLD AUTO: 0.08 THOUSAND/ΜL (ref 0–0.61)
EOSINOPHIL NFR BLD AUTO: 2 % (ref 0–6)
ERYTHROCYTE [DISTWIDTH] IN BLOOD BY AUTOMATED COUNT: 11.8 % (ref 11.6–15.1)
GFR SERPL CREATININE-BSD FRML MDRD: 68 ML/MIN/1.73SQ M
GLUCOSE P FAST SERPL-MCNC: 85 MG/DL (ref 65–99)
HCT VFR BLD AUTO: 45.7 % (ref 36.5–49.3)
HDLC SERPL-MCNC: 58 MG/DL
HGB BLD-MCNC: 15 G/DL (ref 12–17)
IMM GRANULOCYTES # BLD AUTO: 0.02 THOUSAND/UL (ref 0–0.2)
IMM GRANULOCYTES NFR BLD AUTO: 1 % (ref 0–2)
LDLC SERPL CALC-MCNC: 134 MG/DL (ref 0–100)
LYMPHOCYTES # BLD AUTO: 1.59 THOUSANDS/ΜL (ref 0.6–4.47)
LYMPHOCYTES NFR BLD AUTO: 36 % (ref 14–44)
MCH RBC QN AUTO: 29.6 PG (ref 26.8–34.3)
MCHC RBC AUTO-ENTMCNC: 32.8 G/DL (ref 31.4–37.4)
MCV RBC AUTO: 90 FL (ref 82–98)
MONOCYTES # BLD AUTO: 0.32 THOUSAND/ΜL (ref 0.17–1.22)
MONOCYTES NFR BLD AUTO: 7 % (ref 4–12)
NEUTROPHILS # BLD AUTO: 2.32 THOUSANDS/ΜL (ref 1.85–7.62)
NEUTS SEG NFR BLD AUTO: 53 % (ref 43–75)
NRBC BLD AUTO-RTO: 0 /100 WBCS
PLATELET # BLD AUTO: 189 THOUSANDS/UL (ref 149–390)
PMV BLD AUTO: 10.5 FL (ref 8.9–12.7)
POTASSIUM SERPL-SCNC: 4.2 MMOL/L (ref 3.5–5.3)
PROT SERPL-MCNC: 7.1 G/DL (ref 6.4–8.2)
RBC # BLD AUTO: 5.07 MILLION/UL (ref 3.88–5.62)
SL AMB  POCT GLUCOSE, UA: NORMAL
SL AMB LEUKOCYTE ESTERASE,UA: NORMAL
SL AMB POCT BILIRUBIN,UA: NORMAL
SL AMB POCT BLOOD,UA: NORMAL
SL AMB POCT CLARITY,UA: CLEAR
SL AMB POCT COLOR,UA: YELLOW
SL AMB POCT KETONES,UA: NORMAL
SL AMB POCT NITRITE,UA: NORMAL
SL AMB POCT PH,UA: 6.5
SL AMB POCT SPECIFIC GRAVITY,UA: 1.02
SL AMB POCT URINE PROTEIN: NORMAL
SL AMB POCT UROBILINOGEN: 0.2
SODIUM SERPL-SCNC: 141 MMOL/L (ref 136–145)
TRIGL SERPL-MCNC: 148 MG/DL
TSH SERPL DL<=0.05 MIU/L-ACNC: 1.74 UIU/ML (ref 0.36–3.74)
WBC # BLD AUTO: 4.37 THOUSAND/UL (ref 4.31–10.16)

## 2020-09-29 PROCEDURE — 80061 LIPID PANEL: CPT

## 2020-09-29 PROCEDURE — 81003 URINALYSIS AUTO W/O SCOPE: CPT | Performed by: UROLOGY

## 2020-09-29 PROCEDURE — 85025 COMPLETE CBC W/AUTO DIFF WBC: CPT

## 2020-09-29 PROCEDURE — 84403 ASSAY OF TOTAL TESTOSTERONE: CPT

## 2020-09-29 PROCEDURE — 84443 ASSAY THYROID STIM HORMONE: CPT

## 2020-09-29 PROCEDURE — 1036F TOBACCO NON-USER: CPT | Performed by: UROLOGY

## 2020-09-29 PROCEDURE — 36415 COLL VENOUS BLD VENIPUNCTURE: CPT

## 2020-09-29 PROCEDURE — 80053 COMPREHEN METABOLIC PANEL: CPT

## 2020-09-29 PROCEDURE — 99214 OFFICE O/P EST MOD 30 MIN: CPT | Performed by: UROLOGY

## 2020-09-29 PROCEDURE — 84402 ASSAY OF FREE TESTOSTERONE: CPT

## 2020-09-29 NOTE — PROGRESS NOTES
[address] [facility]  [facility]  [address] [facility], [address] [place] [address]  [phone]  www  [facility]  org      NAME: Octavia Schumacher  AGE: 47 y o  SEX: male  : 1970   MRN: [id number]    DATE: 2020  TIME: 10:01 AM    Assessment and Plan :    BPH with obstruction and pelvic floor dysfunction: We will assess the size of his prostate with a CT scan I am ordering for his right flank pain  He continues to have significant lower urinary tract symptoms I think some of this may be due to his back issues causing pelvic floor dysfunction but he also may have a BPH component  He has had cystoscopy by me in the past   I will send him the results of the CT scan  History of hypogonadism:  Check testosterone and send him the results  If all is normal see me in 1 year  Chief Complaint   No chief complaint on file  History of Present Illness     Follow-up pelvic floor dysfunction, history of hypogonadism the extreme fatigue and sexual dysfunction, BPH with obstruction probably related to pelvic floor dysfunction, erectile dysfunction on Viagra and prostate cancer screening  Last seen 2020  At that time  I ordered another testosteroneThis was drawn this morning so we will need to wait for the results of this  Gerhardt Sep PSA was 0 7 yesterday  He complains of right flank pain and Dr Nicki Ying a chest x-ray which was negative  He was having discussions with him regarding getting a CT scan of the abdomen and pelvis  He currently complains of urgency, nocturia 5 times a night and he is back pain  He is also having dribbling-  This is not new  He still has pain after ejaculation  Never made physical therapy because of the COVID virus      The following portions of the patient's history were reviewed and updated as appropriate: allergies, current medications, past family history, past medical history, past social history, past surgical history and problem list   Past Medical History:   Diagnosis Date    Benign prostatic hyperplasia without lower urinary tract symptoms     Chronic prostatitis     Erectile dysfunction     Hypertension     Testicular hypogonadism      Past Surgical History:   Procedure Laterality Date    CYSTOSCOPY      Diagnostic Bladder    HERNIA REPAIR      SURGERY SCROTAL / TESTICULAR      Spermatic Cord for Varicocele- per [employer]     shoulder  Review of Systems   Review of Systems   Constitutional: Negative for fever  Respiratory: Negative for shortness of breath  Cardiovascular: Negative for chest pain  Genitourinary: Negative  Active Problem List     Patient Active Problem List   Diagnosis    Allergic rhinitis    Chronic cluster headache, not intractable    Epididymitis    Erectile dysfunction of non-organic origin    Hematuria, microscopic    Hyperlipidemia    Hypertension    Intractable pain    Leukopenia    Palpitations    Cervical radiculopathy    Cervical spondylosis without myelopathy    Pain in joint involving ankle and foot    Lateral epicondylitis    Pain in elbow    Plantar fasciitis    Acute prostatitis    Right flank pain    Strain of gastrocnemius tendon    Stress fracture of tibia    Knee joint effusion    Derangement of medial meniscus       Objective   /90   Pulse 63   Temp (!) 97 3 °F (36 3 °C)   Ht 5' 11" (1 803 m)   Wt 108 kg (237 lb)   BMI 33 05 kg/m²     Physical Exam  Constitutional:       Appearance: Normal appearance  HENT:      Head: Normocephalic and atraumatic  Eyes:      Extraocular Movements: Extraocular movements intact  Pulmonary:      Effort: Pulmonary effort is normal    Musculoskeletal: Normal range of motion  Neurological:      General: No focal deficit present  Mental Status: He is alert and oriented to person, place, and time     Psychiatric:         Mood and Affect: Mood normal          Behavior: Behavior normal  Thought Content: Thought content normal          Judgment: Judgment normal              Current Medications     Current Outpatient Medications:     Glucosamine Sulfate (SYNOVACIN PO), Glucosamine, Disp: , Rfl:     Krill Oil 1000 MG CAPS, krill oil, Disp: , Rfl:     losartan (COZAAR) 25 mg tablet, TAKE 1 TABLET BY MOUTH EVERY DAY, Disp: 90 tablet, Rfl: 1    Multiple Vitamins-Minerals (MULTIVITAMIN ADULT EXTRA C PO), one daily, Disp: , Rfl:     Nutritional Supplements (PROTEIN SUPPLEMENT 80% PO), daily  , Disp: , Rfl:     sildenafil (VIAGRA) 50 MG tablet, Take 1 tablet (50 mg total) by mouth daily as needed for erectile dysfunction, Disp: 10 tablet, Rfl: 11        Nicolas Chavez MD

## 2020-09-30 LAB
TESTOST FREE SERPL-MCNC: 10.6 PG/ML (ref 7.2–24)
TESTOST SERPL-MCNC: 338 NG/DL (ref 264–916)

## 2020-10-01 ENCOUNTER — TELEPHONE (OUTPATIENT)
Dept: UROLOGY | Facility: MEDICAL CENTER | Age: 50
End: 2020-10-01

## 2020-10-06 ENCOUNTER — HOSPITAL ENCOUNTER (OUTPATIENT)
Dept: CT IMAGING | Facility: HOSPITAL | Age: 50
Discharge: HOME | End: 2020-10-06
Attending: UROLOGY
Payer: COMMERCIAL

## 2020-10-06 DIAGNOSIS — R10.9 RIGHT FLANK PAIN: ICD-10-CM

## 2020-10-06 PROCEDURE — G1004 CDSM NDSC: HCPCS

## 2020-10-06 PROCEDURE — 74176 CT ABD & PELVIS W/O CONTRAST: CPT

## 2020-10-13 ENCOUNTER — TELEPHONE (OUTPATIENT)
Dept: UROLOGY | Facility: MEDICAL CENTER | Age: 50
End: 2020-10-13

## 2020-10-22 ENCOUNTER — TELEPHONE (OUTPATIENT)
Dept: FAMILY MEDICINE CLINIC | Facility: CLINIC | Age: 50
End: 2020-10-22

## 2020-10-22 DIAGNOSIS — I10 ESSENTIAL HYPERTENSION: ICD-10-CM

## 2020-10-22 RX ORDER — LOSARTAN POTASSIUM 25 MG/1
25 TABLET ORAL DAILY
Qty: 90 TABLET | Refills: 0 | Status: SHIPPED | OUTPATIENT
Start: 2020-10-22 | End: 2021-01-19

## 2020-10-29 ENCOUNTER — OFFICE VISIT (OUTPATIENT)
Dept: FAMILY MEDICINE CLINIC | Facility: CLINIC | Age: 50
End: 2020-10-29
Payer: COMMERCIAL

## 2020-10-29 VITALS
SYSTOLIC BLOOD PRESSURE: 132 MMHG | DIASTOLIC BLOOD PRESSURE: 70 MMHG | HEART RATE: 84 BPM | TEMPERATURE: 98 F | WEIGHT: 238 LBS | BODY MASS INDEX: 33.32 KG/M2 | HEIGHT: 71 IN

## 2020-10-29 DIAGNOSIS — F52.21 ERECTILE DYSFUNCTION OF NON-ORGANIC ORIGIN: ICD-10-CM

## 2020-10-29 DIAGNOSIS — I10 ESSENTIAL HYPERTENSION: Primary | ICD-10-CM

## 2020-10-29 DIAGNOSIS — S81.819A LACERATION OF LOWER EXTREMITY, UNSPECIFIED LATERALITY, INITIAL ENCOUNTER: ICD-10-CM

## 2020-10-29 DIAGNOSIS — Z23 NEED FOR IMMUNIZATION AGAINST PERTUSSIS: ICD-10-CM

## 2020-10-29 DIAGNOSIS — M54.12 CERVICAL RADICULOPATHY: ICD-10-CM

## 2020-10-29 DIAGNOSIS — E78.2 MIXED HYPERLIPIDEMIA: ICD-10-CM

## 2020-10-29 PROCEDURE — 99214 OFFICE O/P EST MOD 30 MIN: CPT | Performed by: FAMILY MEDICINE

## 2020-10-29 PROCEDURE — 3075F SYST BP GE 130 - 139MM HG: CPT | Performed by: FAMILY MEDICINE

## 2020-10-29 PROCEDURE — 3078F DIAST BP <80 MM HG: CPT | Performed by: FAMILY MEDICINE

## 2020-10-29 PROCEDURE — 3008F BODY MASS INDEX DOCD: CPT | Performed by: FAMILY MEDICINE

## 2020-10-29 PROCEDURE — 90715 TDAP VACCINE 7 YRS/> IM: CPT | Performed by: FAMILY MEDICINE

## 2020-10-29 PROCEDURE — 90471 IMMUNIZATION ADMIN: CPT | Performed by: FAMILY MEDICINE

## 2021-01-14 DIAGNOSIS — I10 ESSENTIAL HYPERTENSION: ICD-10-CM

## 2021-01-19 RX ORDER — LOSARTAN POTASSIUM 25 MG/1
TABLET ORAL
Qty: 90 TABLET | Refills: 0 | Status: SHIPPED | OUTPATIENT
Start: 2021-01-19 | End: 2021-04-15

## 2021-04-15 DIAGNOSIS — I10 ESSENTIAL HYPERTENSION: ICD-10-CM

## 2021-04-15 RX ORDER — LOSARTAN POTASSIUM 25 MG/1
TABLET ORAL
Qty: 90 TABLET | Refills: 0 | Status: SHIPPED | OUTPATIENT
Start: 2021-04-15 | End: 2021-07-19

## 2021-04-22 ENCOUNTER — RA CDI HCC (OUTPATIENT)
Dept: OTHER | Facility: HOSPITAL | Age: 51
End: 2021-04-22

## 2021-04-22 NOTE — PROGRESS NOTES
[facility]  coding opportunities          Chart reviewed, no opportunity found: CHART REVIEWED, NO OPPORTUNITY FOUND              Patients insurance company:  [employer] [facility] (Medicare Advantage and Commercial)

## 2021-07-05 ENCOUNTER — OFFICE VISIT (OUTPATIENT)
Dept: URGENT CARE | Facility: MEDICAL CENTER | Age: 51
End: 2021-07-05
Payer: COMMERCIAL

## 2021-07-05 ENCOUNTER — APPOINTMENT (OUTPATIENT)
Dept: RADIOLOGY | Facility: MEDICAL CENTER | Age: 51
End: 2021-07-05
Payer: COMMERCIAL

## 2021-07-05 ENCOUNTER — TRANSCRIBE ORDERS (OUTPATIENT)
Dept: URGENT CARE | Facility: MEDICAL CENTER | Age: 51
End: 2021-07-05

## 2021-07-05 VITALS
TEMPERATURE: 97.5 F | HEART RATE: 80 BPM | SYSTOLIC BLOOD PRESSURE: 142 MMHG | OXYGEN SATURATION: 97 % | DIASTOLIC BLOOD PRESSURE: 82 MMHG

## 2021-07-05 DIAGNOSIS — R07.81 RIB PAIN ON RIGHT SIDE: ICD-10-CM

## 2021-07-05 DIAGNOSIS — R07.81 RIB PAIN ON RIGHT SIDE: Primary | ICD-10-CM

## 2021-07-05 DIAGNOSIS — M54.6 THORACIC BACK PAIN, UNSPECIFIED BACK PAIN LATERALITY, UNSPECIFIED CHRONICITY: Primary | ICD-10-CM

## 2021-07-05 PROCEDURE — 99213 OFFICE O/P EST LOW 20 MIN: CPT | Performed by: PHYSICIAN ASSISTANT

## 2021-07-05 PROCEDURE — 71046 X-RAY EXAM CHEST 2 VIEWS: CPT

## 2021-07-05 RX ORDER — CYCLOBENZAPRINE HCL 10 MG
10 TABLET ORAL 3 TIMES DAILY PRN
Qty: 21 TABLET | Refills: 0 | Status: SHIPPED | OUTPATIENT
Start: 2021-07-05 | End: 2022-01-04

## 2021-07-05 RX ORDER — PREDNISONE 20 MG/1
40 TABLET ORAL DAILY
Qty: 10 TABLET | Refills: 0 | Status: SHIPPED | OUTPATIENT
Start: 2021-07-05 | End: 2021-07-10

## 2021-07-05 NOTE — PROGRESS NOTES
[address] [facility]        NAME: Henri Hagen is a 45 y o  male  : 1970    MRN: [id number]  DATE: 2021  TIME: 2:24 PM    Assessment and Plan   Rib pain on right side [R07 81]  1  Rib pain on right side  XR chest pa & lateral    predniSONE 20 mg tablet    cyclobenzaprine (FLEXERIL) 10 mg tablet     Urine (-) CXR provider read no acute findings identified  Physical exam unremarkable  No CVA tenderness  No abdomen tenderness  VSS  Likely muscle spasm from lifting granddaughter  I educated pt on signs and sx of worsening condition and indications to return or go to ER  Pt states he understands and agrees  Patient Instructions       Continue to monitor symptoms  If new or worsening symptoms develop, go immediately to Er  Drink plenty of fluids  Follow up with Family Doctor this week  Chief Complaint     Chief Complaint   Patient presents with    Chest Pain         History of Present Illness       Pt has R sided rib pain that started about 1 week ago  Started suddenly after he bent down to  his granddaughter  Since then he has pain in his R upper back that wraps around to R ribs  Pain worse with movement and deep inspiration, better with rest   Pt has been taking naproxen with no relief  No CP no SOB  No dizzines  No abdominal pain  No NVD  No palpitations  No prior injury to this area  No dizziness  Review of Systems   Review of Systems   Constitutional: Negative  Negative for chills, fatigue and fever  HENT: Negative  Eyes: Negative  Respiratory: Negative  Negative for chest tightness, shortness of breath and wheezing  Cardiovascular: Negative  Negative for palpitations  Gastrointestinal: Negative  Negative for abdominal pain, diarrhea, nausea and vomiting  Endocrine: Negative  Genitourinary: Negative  Musculoskeletal: Negative for back pain, neck pain and neck stiffness  Skin: Negative  Negative for color change, rash and wound  Neurological: Negative for dizziness, weakness, light-headedness, numbness and headaches  Hematological: Negative  Psychiatric/Behavioral: Negative  Current Medications       Current Outpatient Medications:     cyclobenzaprine (FLEXERIL) 10 mg tablet, Take 1 tablet (10 mg total) by mouth 3 (three) times a day as needed for muscle spasms, Disp: 21 tablet, Rfl: 0    Glucosamine Sulfate (SYNOVACIN PO), Glucosamine, Disp: , Rfl:     Krill Oil 1000 MG CAPS, krill oil, Disp: , Rfl:     losartan (COZAAR) 25 mg tablet, TAKE 1 TABLET BY MOUTH EVERY DAY, Disp: 90 tablet, Rfl: 0    Multiple Vitamins-Minerals (MULTIVITAMIN ADULT EXTRA C PO), one daily, Disp: , Rfl:     Nutritional Supplements (PROTEIN SUPPLEMENT 80% PO), daily  , Disp: , Rfl:     predniSONE 20 mg tablet, Take 2 tablets (40 mg total) by mouth daily for 5 days, Disp: 10 tablet, Rfl: 0    sildenafil (VIAGRA) 50 MG tablet, Take 1 tablet (50 mg total) by mouth daily as needed for erectile dysfunction, Disp: 10 tablet, Rfl: 11    Current Allergies     Allergies as of 07/05/2021 - Reviewed 07/05/2021   Allergen Reaction Noted    Penicillins  03/21/2017            The following portions of the patient's history were reviewed and updated as appropriate: allergies, current medications, past family history, past medical history, past social history, past surgical history and problem list      Past Medical History:   Diagnosis Date    Benign prostatic hyperplasia without lower urinary tract symptoms     Chronic prostatitis     Erectile dysfunction     Hypertension     Testicular hypogonadism        Past Surgical History:   Procedure Laterality Date    CYSTOSCOPY      Diagnostic Bladder    HERNIA REPAIR      SURGERY SCROTAL / TESTICULAR      Spermatic Cord for Varicocele- per [employer]       Family History   Problem Relation Age of Onset    Breast cancer Mother     Arthritis Father     Colon cancer Father     Coronary artery disease Father     Nephrolithiasis Father     Lung cancer Father     Testicular cancer Father     Osteoporosis Father     Cerebral palsy Brother          Medications have been verified  Objective   /82   Pulse 80   Temp 97 5 °F (36 4 °C)   SpO2 97%        Physical Exam     Physical Exam  Vitals and nursing note reviewed  Constitutional:       General: He is not in acute distress  Appearance: Normal appearance  He is well-developed  He is not ill-appearing or diaphoretic  HENT:      Head: Normocephalic and atraumatic  Cardiovascular:      Rate and Rhythm: Normal rate and regular rhythm  Pulses: Normal pulses  Heart sounds: Normal heart sounds  Pulmonary:      Effort: Pulmonary effort is normal  No respiratory distress  Breath sounds: Normal breath sounds  No stridor  No wheezing, rhonchi or rales  Chest:      Chest wall: Tenderness (Mild TTP from R thoracic area around to midaxillary line) present  Abdominal:      General: Abdomen is flat  Bowel sounds are normal  There is no distension  Tenderness: There is no abdominal tenderness  There is no right CVA tenderness, left CVA tenderness or guarding  Musculoskeletal:      Cervical back: Normal range of motion and neck supple  Comments: FROM thoracic spine in all modalities, pain worse with flexion and twisting better with rest   Mild diffuse paraspinal tenderness in thoracic back inferior to R scapula  Strength sensation circulation intact and symmetrical to UE and LE b/l   Lymphadenopathy:      Cervical: No cervical adenopathy  Skin:     General: Skin is warm  Capillary Refill: Capillary refill takes less than 2 seconds  Coloration: Skin is not pale  Findings: No rash  Neurological:      Mental Status: He is alert

## 2021-07-05 NOTE — PATIENT INSTRUCTIONS
Continue to monitor symptoms  If new or worsening symptoms develop, go immediately to [facility]  Drink plenty of fluids  Follow up with Family Doctor this week  Thoracic Back Strain   WHAT YOU NEED TO KNOW:   A thoracic back strain is a muscle or tendon injury in your upper or middle back  You may have pain, muscle spasms, swelling, or stiffness  A mild strain may cause minor pain that goes away in a few days  A more severe strain may cause the muscle or tendon to tear  There is a very small chance you may need surgery to fix the tear  DISCHARGE INSTRUCTIONS:   Call your local emergency number (911 in the [address] [place] [address],[address]) for any of the following:   · You have chest pain or shortness of breath  Return to the emergency department if:   · You have severe pain, or pain that spreads from your back to other areas  · You have new or increased swelling or redness in the injured area  Call your doctor if:   · You have questions or concerns about your condition or care  Medicines: You may need any of the following:  · Prescription pain medicine  may be given  Ask your healthcare provider how to take this medicine safely  Some prescription pain medicines contain acetaminophen  Do not take other medicines that contain acetaminophen without talking to your healthcare provider  Too much acetaminophen may cause liver damage  Prescription pain medicine may cause constipation  Ask your healthcare provider how to prevent or treat constipation  · NSAIDs , such as ibuprofen, help decrease swelling, pain, and fever  This medicine is available with or without a doctor's order  NSAIDs can cause stomach bleeding or kidney problems in certain people  If you take blood thinner medicine, always ask your healthcare provider if NSAIDs are safe for you  Always read the medicine label and follow directions  · Muscle relaxers  help prevent or treat spasms  · Take your medicine as directed    Contact your healthcare provider if you think your medicine is not helping or if you have side effects  Tell him or her if you are allergic to any medicine  Keep a list of the medicines, vitamins, and herbs you take  Include the amounts, and when and why you take them  Bring the list or the pill bottles to follow-up visits  Carry your medicine list with you in case of an emergency  Self-care:   · Rest as directed  Move slowly and carefully  Do not lift heavy objects  · Apply ice or heat as directed  Ice decreases pain and swelling and may help decrease tissue damage  Heat helps decrease muscle spasms  Your healthcare provider may tell you to apply only ice for the first 24 hours to help reduce swelling  Apply ice or heat to the area for 20 minutes every hour, or as directed  Ask how many times to do this each day, and for how many days  · Use an elastic wrap or back brace as directed  These will help keep the injured area from moving so it can heal     · Go to physical therapy as directed  A physical therapist can teach you exercises to help strengthen your back  He or she can also teach you safe ways to bend and move so you do not cause more injury  Prevent another thoracic back strain:   · Lift objects carefully  Ask someone to help you lift heavy objects  If you must lift an object by yourself, do not use your back muscles to lift  Lift with your legs  · Check your posture  Keep your upper body lifted and your head up  Poor posture can cause back strain or make it worse  Adjust your position if you work in front of a computer  You may need arm or wrist supports or change the height of your chair  · Exercise as directed  Exercise can help strengthen your muscles and make you more flexible  Do not exercise or play sports when you are tired  Always warm up before you start and cool down when you finish  · Stretch your muscles as directed  Keep your muscles limber by stretching every day  Stretch before you exercise      Follow up with your doctor as directed: You may need more tests to check for other injuries or to see how your injury is healing  You may also need to see a specialist  Write down your questions so you remember to ask them during your visits  © Copyright [address] Information is for End User's use only and may not be sold, redistributed or otherwise used for commercial purposes  All illustrations and images included in CareNotes® are the copyrighted property of A D A M , Inc  or [facility] Lilian Stephens   The above information is an  only  It is not intended as medical advice for individual conditions or treatments  Talk to your doctor, nurse or pharmacist before following any medical regimen to see if it is safe and effective for you

## 2021-07-06 ENCOUNTER — OFFICE VISIT (OUTPATIENT)
Dept: FAMILY MEDICINE CLINIC | Facility: CLINIC | Age: 51
End: 2021-07-06
Payer: COMMERCIAL

## 2021-07-06 VITALS
BODY MASS INDEX: 32.26 KG/M2 | HEART RATE: 72 BPM | DIASTOLIC BLOOD PRESSURE: 78 MMHG | WEIGHT: 230.4 LBS | SYSTOLIC BLOOD PRESSURE: 140 MMHG | HEIGHT: 71 IN | RESPIRATION RATE: 16 BRPM

## 2021-07-06 DIAGNOSIS — R07.81 RIB PAIN ON RIGHT SIDE: ICD-10-CM

## 2021-07-06 PROCEDURE — 3008F BODY MASS INDEX DOCD: CPT | Performed by: FAMILY MEDICINE

## 2021-07-06 PROCEDURE — 99213 OFFICE O/P EST LOW 20 MIN: CPT | Performed by: FAMILY MEDICINE

## 2021-07-06 PROCEDURE — 3078F DIAST BP <80 MM HG: CPT | Performed by: FAMILY MEDICINE

## 2021-07-06 PROCEDURE — 3077F SYST BP >= 140 MM HG: CPT | Performed by: FAMILY MEDICINE

## 2021-07-06 PROCEDURE — 1036F TOBACCO NON-USER: CPT | Performed by: FAMILY MEDICINE

## 2021-07-06 RX ORDER — MELOXICAM 15 MG/1
15 TABLET ORAL DAILY
Qty: 30 TABLET | Refills: 0 | Status: SHIPPED | OUTPATIENT
Start: 2021-07-06 | End: 2022-01-04

## 2021-07-06 NOTE — PATIENT INSTRUCTIONS
Problem List Items Addressed This Visit     Rib pain on right side     Patient has significant right-sided pain  Is more thoracic discomfort  There is definitely muscle spasms  Would recommend that he finish the prednisone as was prescribed, continue on the Flexeril, and add meloxicam 15 mg daily  Did discuss the possibility of adding oxycodone for severe pain  That would only be p r n  Charlie Madrid Patient did not wish to use the oxycodone at this time  Will hold that in reserve if he needs more medication than just the meloxicam   Again, the point of oxycodone as short-term only  Relevant Medications    meloxicam (MOBIC) 15 mg tablet          COVID 19 Instructions    Caitlyn Dao was advised to limit contact with others to essential tasks such as getting food, medications, and medical care  Proper handwashing reviewed, and Hand sanitzer when washing is not available  If the patient develops symptoms of COVID 19, the patient should call the office as soon as possible  For 5025-9979 Flu season, it is strongly recommended that Flu Vaccinations be obtained  Virtual Visits may be conducted in several ways: Mac: You should get a text message when the provider is ready to see you  Click on the link in the text message, and the call should start  You will need to type in your name, and allow camera and microphone access  This is HIPPA compliant, and secure  Please try to download Google Duo  Once you do download this on your phone, you will be prompted to add your phone number to the account  After that, he should receive a text from [employer], and use that code to verify your phone number  After that, you should be able to use Google Duo to receive and make video calls  Please download Microsoft Teams to your phone or computer  You would get an email with the meeting after scheduling with the office    You will Join the meeting, and wait there till the provider joins as well   Instructions for downloading this are available from the office  This is HIPPA compliant, and secure  We are committed to getting you vaccinated as soon as possible and will be closely following CDC and [facility]  guidelines as they are released and revised  Please refer to our COVID-19 vaccine webpage for the most up to date information on the vaccine and our distribution efforts  [employer] tn    OUR NEW LOCATION:  Starting around 28June2021, our new location and phone are:    [address], [address] [place], [place], [address]  Fax: [phone]    Lab services and OB/GYN will be at this location as well

## 2021-07-06 NOTE — ASSESSMENT & PLAN NOTE
Patient has significant right-sided pain  Is more thoracic discomfort  There is definitely muscle spasms  Would recommend that he finish the prednisone as was prescribed, continue on the Flexeril, and add meloxicam 15 mg daily  Did discuss the possibility of adding oxycodone for severe pain  That would only be p r n  Peggy Gamble Patient did not wish to use the oxycodone at this time  Will hold that in reserve if he needs more medication than just the meloxicam   Again, the point of oxycodone as short-term only

## 2021-07-06 NOTE — PROGRESS NOTES
Assessment and Plan:    Problem List Items Addressed This Visit     Rib pain on right side     Patient has significant right-sided pain  Is more thoracic discomfort  There is definitely muscle spasms  Would recommend that he finish the prednisone as was prescribed, continue on the Flexeril, and add meloxicam 15 mg daily  Did discuss the possibility of adding oxycodone for severe pain  That would only be p r n  Aron Herbert Patient did not wish to use the oxycodone at this time  Will hold that in reserve if he needs more medication than just the meloxicam   Again, the point of oxycodone as short-term only  Relevant Medications    meloxicam (MOBIC) 15 mg tablet                 Diagnoses and all orders for this visit:    Rib pain on right side  -     meloxicam (MOBIC) 15 mg tablet; Take 1 tablet (15 mg total) by mouth daily              Subjective:      Patient ID: Garry Torres is a 45 y o  male  CC:    Chief Complaint   Patient presents with    Rib Injury     pt here with back pain, pt states he went to urgent care yesterday   Back Pain       HPI:    Please see chief complaint  Reviewed note from urgent care  Patient had significant discomfort on the right side of the thoracic chest   It wraps around into the anterior aspect  He did note that the pain is quite severe, especially when he takes a deep breath  He cannot take a full deep breath based on  Chest x-ray was performed at urgent care  Initial reading was negative  Patient has been taking the prednisone and Flexeril from urgent care  Minimal to no help  Patient did take naproxen when he went to urgent care  The following portions of the patient's history were reviewed and updated as appropriate: allergies, current medications, past family history, past medical history, past social history, past surgical history and problem list       Review of Systems   Constitutional: Negative  HENT: Negative      Respiratory: Negative  Musculoskeletal:        Per HPI         Data to review:       Objective:    Vitals:    07/06/21 1438   BP: 140/78   BP Location: Left arm   Patient Position: Sitting   Cuff Size: Large   Pulse: 72   Resp: 16   Weight: 105 kg (230 lb 6 4 oz)   Height: 5' 11" (1 803 m)        Physical Exam  Vitals and nursing note reviewed  Constitutional:       Appearance: Normal appearance  Neck:      Vascular: No carotid bruit  Cardiovascular:      Rate and Rhythm: Normal rate and regular rhythm  Pulses: Normal pulses  Carotid pulses are 2+ on the right side and 2+ on the left side  Heart sounds: Normal heart sounds  No murmur heard  No gallop  Pulmonary:      Effort: Pulmonary effort is normal  No respiratory distress  Breath sounds: Normal breath sounds  No stridor  No wheezing, rhonchi or rales  Chest:      Chest wall: No tenderness  Musculoskeletal:        Back:    Neurological:      Mental Status: He is alert

## 2021-07-17 DIAGNOSIS — I10 ESSENTIAL HYPERTENSION: ICD-10-CM

## 2021-07-19 RX ORDER — LOSARTAN POTASSIUM 25 MG/1
TABLET ORAL
Qty: 90 TABLET | Refills: 0 | Status: SHIPPED | OUTPATIENT
Start: 2021-07-19 | End: 2021-10-15

## 2021-07-22 ENCOUNTER — TELEPHONE (OUTPATIENT)
Dept: UROLOGY | Facility: MEDICAL CENTER | Age: 51
End: 2021-07-22

## 2021-07-22 DIAGNOSIS — R86.8 DISCOLORED SEMEN: Primary | ICD-10-CM

## 2021-07-22 NOTE — TELEPHONE ENCOUNTER
Patient of Dr Virgie Norman seen in the [facility] office  Patient called and advised that he notice his semen is a yellowish orange color  He also advised that when he ejaculates he has burning  Scheduled patient for 08/09/21 is this appointment appropriate? Patient would like to be seen sooner if possible or get testing if needed  Please advise

## 2021-07-23 NOTE — TELEPHONE ENCOUNTER
Patient was called and advised to have a urinalysis and culture completed  Orders were placed  Patient informed that the office will follow up with results and that he should keep his upcoming appointment on 8/9/21 at 3:00pm      Patient was understand and does not have any further questions at this time

## 2021-08-04 ENCOUNTER — APPOINTMENT (OUTPATIENT)
Dept: LAB | Facility: MEDICAL CENTER | Age: 51
End: 2021-08-04
Payer: COMMERCIAL

## 2021-08-04 DIAGNOSIS — R86.8 DISCOLORED SEMEN: ICD-10-CM

## 2021-08-04 PROCEDURE — 81001 URINALYSIS AUTO W/SCOPE: CPT

## 2021-08-05 LAB
BACTERIA UR QL AUTO: ABNORMAL /HPF
BILIRUB UR QL STRIP: NEGATIVE
CAOX CRY URNS QL MICRO: ABNORMAL /HPF
CLARITY UR: CLEAR
COLOR UR: YELLOW
GLUCOSE UR STRIP-MCNC: NEGATIVE MG/DL
HGB UR QL STRIP.AUTO: NEGATIVE
KETONES UR STRIP-MCNC: NEGATIVE MG/DL
LEUKOCYTE ESTERASE UR QL STRIP: NEGATIVE
NITRITE UR QL STRIP: NEGATIVE
NON-SQ EPI CELLS URNS QL MICRO: ABNORMAL /HPF
PH UR STRIP.AUTO: 6 [PH]
PROT UR STRIP-MCNC: NEGATIVE MG/DL
RBC #/AREA URNS AUTO: ABNORMAL /HPF
SP GR UR STRIP.AUTO: 1.02 (ref 1–1.03)
UROBILINOGEN UR QL STRIP.AUTO: 0.2 E.U./DL
WBC #/AREA URNS AUTO: ABNORMAL /HPF

## 2021-08-09 ENCOUNTER — OFFICE VISIT (OUTPATIENT)
Dept: UROLOGY | Facility: MEDICAL CENTER | Age: 51
End: 2021-08-09
Payer: COMMERCIAL

## 2021-08-09 VITALS
WEIGHT: 230 LBS | DIASTOLIC BLOOD PRESSURE: 80 MMHG | HEART RATE: 89 BPM | BODY MASS INDEX: 32.2 KG/M2 | SYSTOLIC BLOOD PRESSURE: 130 MMHG | HEIGHT: 71 IN

## 2021-08-09 DIAGNOSIS — N41.0 ACUTE PROSTATITIS: Primary | ICD-10-CM

## 2021-08-09 DIAGNOSIS — N40.0 BPH WITHOUT OBSTRUCTION/LOWER URINARY TRACT SYMPTOMS: ICD-10-CM

## 2021-08-09 DIAGNOSIS — Z12.5 PROSTATE CANCER SCREENING: ICD-10-CM

## 2021-08-09 DIAGNOSIS — E29.1 HYPOGONADISM IN MALE: ICD-10-CM

## 2021-08-09 LAB
POST-VOID RESIDUAL VOLUME, ML POC: 28 ML
SL AMB  POCT GLUCOSE, UA: ABNORMAL
SL AMB LEUKOCYTE ESTERASE,UA: ABNORMAL
SL AMB POCT BILIRUBIN,UA: ABNORMAL
SL AMB POCT BLOOD,UA: ABNORMAL
SL AMB POCT CLARITY,UA: CLEAR
SL AMB POCT COLOR,UA: YELLOW
SL AMB POCT KETONES,UA: ABNORMAL
SL AMB POCT NITRITE,UA: ABNORMAL
SL AMB POCT PH,UA: 5
SL AMB POCT SPECIFIC GRAVITY,UA: >=1.03
SL AMB POCT URINE PROTEIN: ABNORMAL
SL AMB POCT UROBILINOGEN: 0.2

## 2021-08-09 PROCEDURE — 3008F BODY MASS INDEX DOCD: CPT | Performed by: FAMILY MEDICINE

## 2021-08-09 PROCEDURE — 51798 US URINE CAPACITY MEASURE: CPT | Performed by: PHYSICIAN ASSISTANT

## 2021-08-09 PROCEDURE — 99214 OFFICE O/P EST MOD 30 MIN: CPT | Performed by: PHYSICIAN ASSISTANT

## 2021-08-09 PROCEDURE — 81003 URINALYSIS AUTO W/O SCOPE: CPT | Performed by: PHYSICIAN ASSISTANT

## 2021-08-09 RX ORDER — DOXYCYCLINE 100 MG/1
100 TABLET ORAL 2 TIMES DAILY
Qty: 28 TABLET | Refills: 1 | Status: SHIPPED | OUTPATIENT
Start: 2021-08-09 | End: 2021-08-23

## 2021-08-09 NOTE — PROGRESS NOTES
8/9/2021      Chief Complaint   Patient presents with    Prostatitis    Benign Prostatic Hypertrophy    Hypogonadism         Assessment and Plan    45 y o  male managed by Dr Nick Blizzard    1  Acute prostatitis  · Negative   · Symptoms correlate with clinical presentation of acute prostatitis  · Doxycycline 100 mg PO BID x 14 days with 1 refill sent to patient's pharmacy  · Follow up in 4 weeks    2  BPH with lower urinary tract symptoms  · PVR: 28 mL  · AUA score: 17  · Reviewed oral medication and surgical intervention for enlarged prostates  · Patient wishes to defer treatment until completion of antibiotic therapy to see if this improves his symptoms  · Follow-up in 4 weeks  3  Hypogonadism   · Patient reports previously managed on exogenous testosterone  · Testosterone free and total as well as PSA labs ordered per patient request   · Will follow-up at office visit in 4 weeks  · If patient needs repeat testing, will have patient complete in 2 weeks prior to upcoming visit  History of Present Illness  Kulwant Pierre  is a 45 y o  male here for evaluation of painful ejaculation  Patient states that he has recently been experiencing perineal pain as well as intermittent painful ejaculations  He does note that the appearance of his ejaculate is of a yellow to orange color in appearance  Patient states that he has had this issue before and Dr Nick Blizzard stated that was benign  He also presents with urinary symptoms including urinary urgency, nocturia 4 times per night  Postvoid dribbling  He denies sensation of incomplete bladder emptying or issues with incontinence  Patient's father had a history of testicular cancer and patient has a personal history of varicocelectomy when he was 4years old  He denies any testicular pain  He denies any episodes of gross hematuria  He denies any flank or abdominal pain  He denies any fevers or chills    He does report increased fatigue and states he used to be managed with exogenous testosterone but has not received in number of years secondary to insurance issues  He does not routinely consume bladder irritants and tries to remain well hydrated with 40-60 ounces of water daily  Review of Systems   Constitutional: Positive for fatigue  Negative for chills and fever  HENT: Negative  Respiratory: Negative  Cardiovascular: Negative  Gastrointestinal: Negative for abdominal pain, nausea and vomiting  Genitourinary: Positive for urgency  Negative for difficulty urinating, dysuria, frequency, hematuria, scrotal swelling and testicular pain  Nocturia 4x per night  Denies sensation of incomplete bladder emptying  Denies issues with incontinence  Post void dribbling  Hx of varicocelectomy at 4years old  Musculoskeletal: Negative  Skin: Negative  Neurological: Negative  AUA SYMPTOM SCORE      Most Recent Value   AUA SYMPTOM SCORE   How often have you had a sensation of not emptying your bladder completely after you finished urinating? 0   How often have you had to urinate again less than two hours after you finished urinating? 3   How often have you found you stopped and started again several times when you urinate? 3   How often have you found it difficult to postpone urination? 2   How often have you had a weak urinary stream?  4   How often have you had to push or strain to begin urination? 1   How many times did you most typically get up to urinate from the time you went to bed at night until the time you got up in the morning? 4   Quality of Life: If you were to spend the rest of your life with your urinary condition just the way it is now, how would you feel about that?  3   AUA SYMPTOM SCORE  17           Vitals  Vitals:    08/09/21 1450   BP: 130/80   Pulse: 89   Weight: 104 kg (230 lb)   Height: 5' 11" (1 803 m)       Physical Exam  Vitals reviewed     Constitutional:       General: He is not in acute distress  Appearance: Normal appearance  He is obese  He is not ill-appearing, toxic-appearing or diaphoretic  HENT:      Head: Normocephalic and atraumatic  Eyes:      Conjunctiva/sclera: Conjunctivae normal    Pulmonary:      Effort: Pulmonary effort is normal  No respiratory distress  Abdominal:      General: There is no distension  Palpations: Abdomen is soft  Tenderness: There is no abdominal tenderness  There is no right CVA tenderness, left CVA tenderness, guarding or rebound  Musculoskeletal:         General: Normal range of motion  Cervical back: Normal range of motion  Skin:     General: Skin is warm and dry  Neurological:      General: No focal deficit present  Mental Status: He is alert and oriented to person, place, and time  Psychiatric:         Mood and Affect: Mood normal          Behavior: Behavior normal          Thought Content:  Thought content normal          Judgment: Judgment normal        Past History  Past Medical History:   Diagnosis Date    Benign prostatic hyperplasia without lower urinary tract symptoms     Chronic prostatitis     Erectile dysfunction     Hypertension     Testicular hypogonadism      Social History     Socioeconomic History    Marital status: /Civil Union     Spouse name: None    Number of children: None    Years of education: None    Highest education level: None   Occupational History    None   Tobacco Use    Smoking status: Never Smoker    Smokeless tobacco: Never Used   Substance and Sexual Activity    Alcohol use: No     Comment: Per [employer]: drinks socially    Drug use: No    Sexual activity: None   Other Topics Concern    None   Social History Narrative    None     Social Determinants of Health     Financial Resource Strain:     Difficulty of Paying Living Expenses:    Food Insecurity:     Worried About Running Out of Food in the Last Year:     Ran Out of Food in the Last Year:    Transportation Needs:     Lack of Transportation (Medical):      Lack of Transportation (Non-Medical):    Physical Activity:     Days of Exercise per Week:     Minutes of Exercise per Session:    Stress:     Feeling of Stress :    Social Connections:     Frequency of Communication with Friends and Family:     Frequency of Social Gatherings with Friends and Family:     Attends Nondenominational Services:     Active Member of Clubs or Organizations:     Attends Club or Organization Meetings:     Marital Status:    Intimate Partner Violence:     Fear of Current or Ex-Partner:     Emotionally Abused:     Physically Abused:     Sexually Abused:      Social History     Tobacco Use   Smoking Status Never Smoker   Smokeless Tobacco Never Used     Family History   Problem Relation Age of Onset    Breast cancer Mother     Arthritis Father     Colon cancer Father     Coronary artery disease Father     Nephrolithiasis Father     Lung cancer Father     Testicular cancer Father     Osteoporosis Father     Cerebral palsy Brother        The following portions of the patient's history were reviewed and updated as appropriate: allergies, current medications, past medical history, past social history, past surgical history and problem list     Results  No results found for this or any previous visit (from the past 1 hour(s)) ]  Lab Results   Component Value Date    PSA 0 7 09/28/2020    PSA 0 7 01/11/2019    PSA 0 7 08/10/2016     Lab Results   Component Value Date    CALCIUM 9 1 09/29/2020    K 4 2 09/29/2020    CO2 29 09/29/2020     09/29/2020    BUN 21 09/29/2020    CREATININE 1 23 09/29/2020     Lab Results   Component Value Date    WBC 4 37 09/29/2020    HGB 15 0 09/29/2020    HCT 45 7 09/29/2020    MCV 90 09/29/2020     09/29/2020     Renetta Evans PA-C

## 2021-08-30 ENCOUNTER — APPOINTMENT (OUTPATIENT)
Dept: LAB | Age: 51
End: 2021-08-30
Payer: COMMERCIAL

## 2021-08-30 DIAGNOSIS — E29.1 HYPOGONADISM IN MALE: ICD-10-CM

## 2021-08-30 DIAGNOSIS — Z12.5 PROSTATE CANCER SCREENING: ICD-10-CM

## 2021-08-30 LAB — PSA SERPL-MCNC: 0.7 NG/ML (ref 0–4)

## 2021-08-30 PROCEDURE — G0103 PSA SCREENING: HCPCS

## 2021-08-30 PROCEDURE — 87186 SC STD MICRODIL/AGAR DIL: CPT

## 2021-08-30 PROCEDURE — 84402 ASSAY OF FREE TESTOSTERONE: CPT

## 2021-08-30 PROCEDURE — 36415 COLL VENOUS BLD VENIPUNCTURE: CPT

## 2021-08-30 PROCEDURE — 84403 ASSAY OF TOTAL TESTOSTERONE: CPT

## 2021-08-30 PROCEDURE — 87086 URINE CULTURE/COLONY COUNT: CPT

## 2021-08-30 PROCEDURE — 87077 CULTURE AEROBIC IDENTIFY: CPT

## 2021-08-31 LAB
TESTOST FREE SERPL-MCNC: 9.7 PG/ML (ref 7.2–24)
TESTOST SERPL-MCNC: 368 NG/DL (ref 264–916)

## 2021-09-01 LAB — BACTERIA UR CULT: ABNORMAL

## 2021-09-08 ENCOUNTER — OFFICE VISIT (OUTPATIENT)
Dept: UROLOGY | Facility: MEDICAL CENTER | Age: 51
End: 2021-09-08
Payer: COMMERCIAL

## 2021-09-08 VITALS
HEIGHT: 71 IN | DIASTOLIC BLOOD PRESSURE: 78 MMHG | BODY MASS INDEX: 32.2 KG/M2 | SYSTOLIC BLOOD PRESSURE: 132 MMHG | WEIGHT: 230 LBS

## 2021-09-08 DIAGNOSIS — N41.0 ACUTE PROSTATITIS: Primary | ICD-10-CM

## 2021-09-08 PROCEDURE — 3008F BODY MASS INDEX DOCD: CPT | Performed by: PHYSICIAN ASSISTANT

## 2021-09-08 PROCEDURE — 1036F TOBACCO NON-USER: CPT | Performed by: PHYSICIAN ASSISTANT

## 2021-09-08 PROCEDURE — 99214 OFFICE O/P EST MOD 30 MIN: CPT | Performed by: PHYSICIAN ASSISTANT

## 2021-09-08 RX ORDER — DOXYCYCLINE 100 MG/1
TABLET ORAL
COMMUNITY
Start: 2021-09-02 | End: 2022-01-04

## 2021-09-08 NOTE — PROGRESS NOTES
9/8/2021      Chief Complaint   Patient presents with    Acute Prostatitis         Assessment and Plan    45 y o  male managed by Dr Era Sanchez     1  Acute prostatitis   · Recent urine culture: 10K-19K Enterococcus faecalis  · Reviewed that these results do not usually warrant antibiotic therapy  · Has not yet finished with doxycycline  · AUA score today: 19, up from 17 at previous visit  · Plan to finish refill of doxycycline  If patient's urinary symptoms persist, will alternative short antibiotic course for his low growing urine culture which was resistant to doxycycline  · If patient continues to have urinary symptoms, will schedule for cystoscopy in the office for further evaluation of his urinary symptoms  2  BPH with lower urinary tract symptoms   · Patient continues to defer oral medication at this time  · CT scan in 2020 was negative for enlarged prostate  · Patient does not routinely consume bladder irritants  · Does remain well hydrated during the day with 40 60 oz of water  · Encourage to continue daily Kegel exercises  · Discussed pelvic floor physical therapy  · See plan above  3  Prostate cancer screening  · PSA: 0 7 (8/30/21)  · Will defer rectal exam for now during setting of prostatitis  4  Hypogonadism  · Total Testosterone: 368  · Patient does not need exogenous testosterone replacement at this time  History of Present Illness  Denver Narayan  is a 45 y o  male here for evaluation of prostatitis and BPH  Patient was previously seen on 08/09/2021 when he presented with symptoms suspicious for acute prostatitis  He was prescribed doxycycline times 14 days with 1 refill  He did refill his prescription but was only given 8 tablets  He plans on going to the pharmacy today to receive his remaining prescription  He states his symptoms have overall remained the same    He denies any dysuria but continues to have urinary frequency, nocturia 4 times per night, and intermittent weak stream   He denies sensation of incomplete bladder emptying or issues with incontinence  He states he did try the Kegel exercises did not consistently keep up with these daily to notice any changes in his urinary symptoms  He states he has had issues with intermittent weak stream for number of years  When discussing oral medications for BPH, patient is not interested at this time  He also continues to have discolored semen  He denies painful ejaculation just states that it feels somewhat irritated  He denies issues with erectile dysfunction but states that he finds himself to be anxious and worried during sexual intercourse with his wife because he is afraid that she is not enjoying it like she used to  We discussed the direct relationship between mental health and sexual health and patient states that he does notice a correlation  He continues to drink mostly water during the day and voids consuming bladder irritants such as caffeine and alcohol  He is agreeable to plan stated above  Review of Systems   Constitutional: Negative for chills and fever  HENT: Negative  Respiratory: Negative  Cardiovascular: Negative  Gastrointestinal: Negative for abdominal pain, nausea and vomiting  Genitourinary: Positive for difficulty urinating (Intermittent weak stream) and frequency  Negative for dysuria, flank pain, hematuria, testicular pain and urgency  Nocturia remains 4x  Intermittently experiences weak stream for years  Denies sensation of incomplete bladder emptying  Continues to experience discoloration of semen but denies painful ejaculation  Musculoskeletal: Negative  Skin: Negative  Neurological: Negative  AUA SYMPTOM SCORE      Most Recent Value   AUA SYMPTOM SCORE   How often have you had a sensation of not emptying your bladder completely after you finished urinating?   1   How often have you had to urinate again less than two hours after you finished urinating? 5   How often have you found you stopped and started again several times when you urinate? 4   How often have you found it difficult to postpone urination? 2   How often have you had a weak urinary stream?  2   How often have you had to push or strain to begin urination? 1   How many times did you most typically get up to urinate from the time you went to bed at night until the time you got up in the morning? 4   Quality of Life: If you were to spend the rest of your life with your urinary condition just the way it is now, how would you feel about that?  4   AUA SYMPTOM SCORE  19           Vitals  Vitals:    09/08/21 0757   BP: 132/78   Weight: 104 kg (230 lb)   Height: 5' 11" (1 803 m)       Physical Exam  Vitals reviewed  Constitutional:       General: He is not in acute distress  Appearance: Normal appearance  He is obese  He is not ill-appearing, toxic-appearing or diaphoretic  HENT:      Head: Normocephalic and atraumatic  Eyes:      Conjunctiva/sclera: Conjunctivae normal    Pulmonary:      Effort: Pulmonary effort is normal  No respiratory distress  Abdominal:      General: There is no distension  Palpations: Abdomen is soft  Tenderness: There is no abdominal tenderness  There is no guarding or rebound  Musculoskeletal:         General: Normal range of motion  Cervical back: Normal range of motion  Skin:     General: Skin is warm and dry  Neurological:      General: No focal deficit present  Mental Status: He is alert and oriented to person, place, and time  Psychiatric:         Mood and Affect: Mood normal          Behavior: Behavior normal          Thought Content:  Thought content normal          Judgment: Judgment normal        Past History  Past Medical History:   Diagnosis Date    Benign prostatic hyperplasia without lower urinary tract symptoms     Chronic prostatitis     Erectile dysfunction     Hypertension     Testicular hypogonadism      Social History     Socioeconomic History    Marital status: /Civil Union     Spouse name: None    Number of children: None    Years of education: None    Highest education level: None   Occupational History    None   Tobacco Use    Smoking status: Never Smoker    Smokeless tobacco: Never Used   Substance and Sexual Activity    Alcohol use: No     Comment: Per [employer]scripts: drinks socially    Drug use: No    Sexual activity: None   Other Topics Concern    None   Social History Narrative    None     Social Determinants of Health     Financial Resource Strain:     Difficulty of Paying Living Expenses:    Food Insecurity:     Worried About Running Out of Food in the Last Year:     Ran Out of Food in the Last Year:    Transportation Needs:     Lack of Transportation (Medical):      Lack of Transportation (Non-Medical):    Physical Activity:     Days of Exercise per Week:     Minutes of Exercise per Session:    Stress:     Feeling of Stress :    Social Connections:     Frequency of Communication with Friends and Family:     Frequency of Social Gatherings with Friends and Family:     Attends Confucianism Services:     Active Member of Clubs or Organizations:     Attends Club or Organization Meetings:     Marital Status:    Intimate Partner Violence:     Fear of Current or Ex-Partner:     Emotionally Abused:     Physically Abused:     Sexually Abused:      Social History     Tobacco Use   Smoking Status Never Smoker   Smokeless Tobacco Never Used     Family History   Problem Relation Age of Onset    Breast cancer Mother     Arthritis Father     Colon cancer Father     Coronary artery disease Father     Nephrolithiasis Father     Lung cancer Father     Testicular cancer Father     Osteoporosis Father     Cerebral palsy Brother        The following portions of the patient's history were reviewed and updated as appropriate: allergies, current medications, past medical history, past social history, past surgical history and problem list     Results  No results found for this or any previous visit (from the past 1 hour(s)) ]  Lab Results   Component Value Date    PSA 0 7 08/30/2021    PSA 0 7 09/28/2020    PSA 0 7 01/11/2019    PSA 0 7 08/10/2016     Lab Results   Component Value Date    CALCIUM 9 1 09/29/2020    K 4 2 09/29/2020    CO2 29 09/29/2020     09/29/2020    BUN 21 09/29/2020    CREATININE 1 23 09/29/2020     Lab Results   Component Value Date    WBC 4 37 09/29/2020    HGB 15 0 09/29/2020    HCT 45 7 09/29/2020    MCV 90 09/29/2020     09/29/2020     Ash Zuleta PA-C

## 2021-12-07 ENCOUNTER — TELEPHONE (OUTPATIENT)
Dept: FAMILY MEDICINE CLINIC | Facility: CLINIC | Age: 51
End: 2021-12-07

## 2021-12-27 ENCOUNTER — HOSPITAL ENCOUNTER (EMERGENCY)
Facility: HOSPITAL | Age: 51
Discharge: HOME | End: 2021-12-27
Attending: EMERGENCY MEDICINE
Payer: COMMERCIAL

## 2021-12-27 ENCOUNTER — APPOINTMENT (EMERGENCY)
Dept: RADIOLOGY | Facility: HOSPITAL | Age: 51
End: 2021-12-27
Payer: COMMERCIAL

## 2021-12-27 VITALS
BODY MASS INDEX: 32.2 KG/M2 | DIASTOLIC BLOOD PRESSURE: 85 MMHG | SYSTOLIC BLOOD PRESSURE: 166 MMHG | TEMPERATURE: 97.5 F | OXYGEN SATURATION: 94 % | WEIGHT: 230 LBS | HEART RATE: 101 BPM | RESPIRATION RATE: 20 BRPM | HEIGHT: 71 IN

## 2021-12-27 DIAGNOSIS — S60.00XA CONTUSION OF FINGER OF LEFT HAND, INITIAL ENCOUNTER: ICD-10-CM
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
## 2021-12-27 PROCEDURE — 99282 EMERGENCY DEPT VISIT SF MDM: CPT | Performed by: EMERGENCY MEDICINE

## 2021-12-27 PROCEDURE — 99284 EMERGENCY DEPT VISIT MOD MDM: CPT

## 2021-12-27 PROCEDURE — 73130 X-RAY EXAM OF HAND: CPT

## 2021-12-27 RX ORDER — ACETAMINOPHEN 325 MG/1
650 TABLET ORAL ONCE
Status: COMPLETED | OUTPATIENT
Start: 2021-12-27 | End: 2021-12-27

## 2021-12-27 RX ADMIN — ACETAMINOPHEN 650 MG: 325 TABLET, FILM COATED ORAL at 18:40
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
## 2022-01-04 ENCOUNTER — OFFICE VISIT (OUTPATIENT)
Dept: FAMILY MEDICINE CLINIC | Facility: CLINIC | Age: 52
End: 2022-01-04
Payer: COMMERCIAL

## 2022-01-04 VITALS
SYSTOLIC BLOOD PRESSURE: 144 MMHG | WEIGHT: 235.8 LBS | OXYGEN SATURATION: 99 % | DIASTOLIC BLOOD PRESSURE: 100 MMHG | HEART RATE: 94 BPM | BODY MASS INDEX: 33.01 KG/M2 | HEIGHT: 71 IN

## 2022-01-04 DIAGNOSIS — R00.2 PALPITATIONS: ICD-10-CM

## 2022-01-04 DIAGNOSIS — E78.2 MIXED HYPERLIPIDEMIA: ICD-10-CM

## 2022-01-04 DIAGNOSIS — Z12.11 SCREEN FOR COLON CANCER: ICD-10-CM
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
## 2022-01-04 DIAGNOSIS — N40.1 BENIGN PROSTATIC HYPERPLASIA WITH NOCTURIA: ICD-10-CM

## 2022-01-04 DIAGNOSIS — I10 ESSENTIAL HYPERTENSION: ICD-10-CM

## 2022-01-04 DIAGNOSIS — F52.21 ERECTILE DYSFUNCTION OF NON-ORGANIC ORIGIN: ICD-10-CM

## 2022-01-04 DIAGNOSIS — M54.12 CERVICAL RADICULOPATHY: ICD-10-CM

## 2022-01-04 DIAGNOSIS — I10 PRIMARY HYPERTENSION: ICD-10-CM

## 2022-01-04 DIAGNOSIS — R35.1 BENIGN PROSTATIC HYPERPLASIA WITH NOCTURIA: ICD-10-CM

## 2022-01-04 DIAGNOSIS — R31.29 HEMATURIA, MICROSCOPIC: ICD-10-CM

## 2022-01-04 PROCEDURE — 99215 OFFICE O/P EST HI 40 MIN: CPT | Performed by: NURSE PRACTITIONER

## 2022-01-04 RX ORDER — LOSARTAN POTASSIUM 50 MG/1
50 TABLET ORAL DAILY
Qty: 30 TABLET | Refills: 2 | Status: SHIPPED | OUTPATIENT
Start: 2022-01-04 | End: 2022-03-25 | Stop reason: SDUPTHER

## 2022-01-04 RX ORDER — METHOCARBAMOL 500 MG/1
500 TABLET, FILM COATED ORAL 3 TIMES DAILY PRN
Qty: 30 TABLET | Refills: 0 | Status: SHIPPED | OUTPATIENT
Start: 2022-01-04

## 2022-01-04 RX ORDER — HYDROXYZINE HYDROCHLORIDE 25 MG/1
50 TABLET, FILM COATED ORAL EVERY 6 HOURS PRN
COMMUNITY

## 2022-01-04 NOTE — ASSESSMENT & PLAN NOTE
Patient denies any current issues with this  Patient is currently not interested in any ED medications
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
## 2022-01-04 NOTE — ASSESSMENT & PLAN NOTE
Due to patient's current radiculopathy symptoms I will have CT of the cervical spine ordered to assess for any acute fractures or other abnormalities  Robaxin also ordered to be used as needed for muscle spasms

## 2022-01-04 NOTE — PATIENT INSTRUCTIONS
Cervical Radiculopathy   WHAT YOU NEED TO KNOW:   Cervical radiculopathy is a painful condition that happens when a spinal nerve in your neck is pinched or irritated  DISCHARGE INSTRUCTIONS:   Medicines: You may need any of the following:  · NSAIDs  help decrease swelling and pain  This medicine can be bought without a doctor's order  This medicine can cause stomach bleeding or kidney problems in certain people  If you take blood thinner medicine, always ask your healthcare provider if NSAIDs are safe for you  Always read the medicine label and follow the directions on it before using this medicine  · Prescription pain medicine  helps decrease pain  Do not wait until the pain is severe before you take this medicine  · Steroids  help decrease pain and swelling  These may be given as a pill or as an injection in your neck  You may need more than 1 injection if your symptoms do not improve after the first treatment  · Take your medicine as directed  Contact your healthcare provider if you think your medicine is not helping or if you have side effects  Tell him of her if you are allergic to any medicine  Keep a list of the medicines, vitamins, and herbs you take  Include the amounts, and when and why you take them  Bring the list or the pill bottles to follow-up visits  Carry your medicine list with you in case of an emergency  Follow up with your healthcare provider or spine specialist as directed:  Write down your questions so you remember to ask them during your visits  Physical therapy:  Your healthcare provider may suggest physical therapy to stretch and strengthen your muscles  Your physical therapist can teach you how to improve your posture and the way you hold your neck  He may also teach you how to be safely active and avoid further injury  He can also help you develop an exercise program that is safe for your back and neck  Self-care:   · Ice  helps decrease swelling and pain   Ice may also help prevent tissue damage  Use an ice pack, or put crushed ice in a plastic bag  Cover it with a towel and place it on your neck for 15 to 20 minutes every hour or as directed  · Rest  when you feel it is needed  Slowly start to do more each day  Return to your daily activities as directed  · Wear a soft collar  You may be given a soft collar to support your neck while you sleep  Wear the soft collar only as directed  · Do light stretches and regular exercise  Your healthcare provider may suggest light stretches to help decrease stiffness in your neck and arm as you recover  After your pain is controlled, you may benefit from regular exercise  Ask what type of exercise is safe for your back and neck  · Review your work area  A comfortable work area can help prevent neck strain  Ask your employer for an ergonomic review to check the position of your desk, chair, phone, and computer  Make any necessary adjustments for your comfort  Contact your healthcare provider or spine specialist if:   · You have a fever  · You are losing weight without trying  · Your pain is worse, even with medicine  · One or both hands feel more numb than before, or you cannot move your fingers well  · You have questions or concerns about your condition or care  © Copyright [employer] 2021 Information is for End User's use only and may not be sold, redistributed or otherwise used for commercial purposes  All illustrations and images included in CareNotes® are the copyrighted property of [employer]  or Corey Clemons  The above information is an  only  It is not intended as medical advice for individual conditions or treatments  Talk to your doctor, nurse or pharmacist before following any medical regimen to see if it is safe and effective for you
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
## 2022-01-04 NOTE — ASSESSMENT & PLAN NOTE
Patient is currently not interested in starting Flomax for his BPH symptoms  Patient reports he would like to follow up with Urology 1st before starting this medication

## 2022-01-04 NOTE — ASSESSMENT & PLAN NOTE
Losartan dosage increased to 50 mg daily  I will have patient return to the office in 3 months for recheck

## 2022-01-13 ENCOUNTER — HOSPITAL ENCOUNTER (OUTPATIENT)
Dept: CT IMAGING | Facility: HOSPITAL | Age: 52
Discharge: HOME | End: 2022-01-13
Payer: COMMERCIAL

## 2022-01-13 DIAGNOSIS — M54.12 CERVICAL RADICULOPATHY: ICD-10-CM

## 2022-01-13 PROCEDURE — 72125 CT NECK SPINE W/O DYE: CPT

## 2022-01-13 PROCEDURE — G1004 CDSM NDSC: HCPCS

## 2022-02-11 ENCOUNTER — OFFICE VISIT (OUTPATIENT)
Dept: FAMILY MEDICINE CLINIC | Facility: CLINIC | Age: 52
End: 2022-02-11
Payer: COMMERCIAL

## 2022-02-11 VITALS
BODY MASS INDEX: 32.95 KG/M2 | WEIGHT: 235.38 LBS | HEIGHT: 71 IN | TEMPERATURE: 97.2 F | SYSTOLIC BLOOD PRESSURE: 160 MMHG | DIASTOLIC BLOOD PRESSURE: 68 MMHG

## 2022-02-11 DIAGNOSIS — H02.006 ENTROPION, LEFT: ICD-10-CM

## 2022-02-11 DIAGNOSIS — I10 PRIMARY HYPERTENSION: ICD-10-CM

## 2022-02-11 DIAGNOSIS — S63.651A SPRAIN OF METACARPOPHALANGEAL (MCP) JOINT OF LEFT INDEX FINGER, INITIAL ENCOUNTER: ICD-10-CM
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
## 2022-02-11 DIAGNOSIS — M54.12 CERVICAL RADICULOPATHY: Primary | ICD-10-CM

## 2022-02-11 DIAGNOSIS — S06.0X0A CONCUSSION WITHOUT LOSS OF CONSCIOUSNESS, INITIAL ENCOUNTER: ICD-10-CM

## 2022-02-11 PROBLEM — S63.659A SPRAIN OF METACARPOPHALANGEAL JOINT: Status: ACTIVE | Noted: 2022-01-06

## 2022-02-11 PROBLEM — S06.0X9A CONCUSSION: Status: ACTIVE | Noted: 2022-02-11

## 2022-02-11 PROBLEM — F41.9 ANXIETY: Status: ACTIVE | Noted: 2021-06-16

## 2022-02-11 PROBLEM — S06.0XAA CONCUSSION: Status: ACTIVE | Noted: 2022-02-11

## 2022-02-11 PROBLEM — M84.369A STRESS FRACTURE OF TIBIA: Status: RESOLVED | Noted: 2019-04-09 | Resolved: 2022-02-11

## 2022-02-11 PROCEDURE — 99214 OFFICE O/P EST MOD 30 MIN: CPT | Performed by: FAMILY MEDICINE

## 2022-02-11 NOTE — ASSESSMENT & PLAN NOTE
Blood pressure was increased today  He is on losartan at 50 mg   I will not make any adjustments today, but we definitely need to follow-up in the future
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
## 2022-02-16 ENCOUNTER — EVALUATION (OUTPATIENT)
Dept: PHYSICAL THERAPY | Facility: CLINIC | Age: 52
End: 2022-02-16
Payer: COMMERCIAL

## 2022-02-16 DIAGNOSIS — S06.0X0A CONCUSSION WITHOUT LOSS OF CONSCIOUSNESS, INITIAL ENCOUNTER: ICD-10-CM

## 2022-02-16 PROCEDURE — 97163 PT EVAL HIGH COMPLEX 45 MIN: CPT | Performed by: PHYSICAL THERAPIST

## 2022-02-16 PROCEDURE — 97112 NEUROMUSCULAR REEDUCATION: CPT | Performed by: PHYSICAL THERAPIST
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
## 2022-02-23 ENCOUNTER — EVALUATION (OUTPATIENT)
Dept: SPEECH THERAPY | Facility: CLINIC | Age: 52
End: 2022-02-23
Payer: COMMERCIAL

## 2022-02-23 DIAGNOSIS — R48.8 OTHER SYMBOLIC DYSFUNCTIONS: Primary | ICD-10-CM

## 2022-02-23 DIAGNOSIS — S06.0X0A CONCUSSION WITHOUT LOSS OF CONSCIOUSNESS, INITIAL ENCOUNTER: ICD-10-CM

## 2022-02-23 PROCEDURE — 96125 COGNITIVE TEST BY HC PRO: CPT

## 2022-02-23 PROCEDURE — 92507 TX SP LANG VOICE COMM INDIV: CPT
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
## 2022-02-24 ENCOUNTER — TELEPHONE (OUTPATIENT)
Dept: FAMILY MEDICINE CLINIC | Facility: CLINIC | Age: 52
End: 2022-02-24

## 2022-02-24 NOTE — TELEPHONE ENCOUNTER
PATIENT STATES [facility] WOULD LIKE FOR HIM TO HAVE PT FOR HIS NECK, BUT THERE IS NOT AN ORDER IN Epic  HE WOULD LIKE TO CONTINUE TO GO TO [facility] FOR THERAPY  WILL WE PLACE ORDER FOR PT NECK FOR PATIENT SO HE CAN CONTINUE TO SEE [facility] FOR PHYSICAL THERAPY?  PLEASE CALL PATIENT OT ADVISE [phone]

## 2022-03-03 ENCOUNTER — EVALUATION (OUTPATIENT)
Dept: PHYSICAL THERAPY | Facility: CLINIC | Age: 52
End: 2022-03-03
Payer: COMMERCIAL

## 2022-03-03 DIAGNOSIS — S06.0X0A CONCUSSION WITHOUT LOSS OF CONSCIOUSNESS, INITIAL ENCOUNTER: Primary | ICD-10-CM

## 2022-03-03 PROCEDURE — 97140 MANUAL THERAPY 1/> REGIONS: CPT

## 2022-03-03 PROCEDURE — 97112 NEUROMUSCULAR REEDUCATION: CPT

## 2022-03-03 PROCEDURE — 97164 PT RE-EVAL EST PLAN CARE: CPT
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
## 2022-03-05 ENCOUNTER — APPOINTMENT (OUTPATIENT)
Dept: LAB | Facility: MEDICAL CENTER | Age: 52
End: 2022-03-05
Payer: COMMERCIAL

## 2022-03-05 DIAGNOSIS — I10 PRIMARY HYPERTENSION: ICD-10-CM

## 2022-03-05 DIAGNOSIS — E78.2 MIXED HYPERLIPIDEMIA: ICD-10-CM

## 2022-03-05 DIAGNOSIS — R35.1 BENIGN PROSTATIC HYPERPLASIA WITH NOCTURIA: ICD-10-CM

## 2022-03-05 DIAGNOSIS — I10 ESSENTIAL HYPERTENSION: ICD-10-CM

## 2022-03-05 DIAGNOSIS — N40.1 BENIGN PROSTATIC HYPERPLASIA WITH NOCTURIA: ICD-10-CM

## 2022-03-05 LAB
ALBUMIN SERPL BCP-MCNC: 3.7 G/DL (ref 3.5–5)
ALP SERPL-CCNC: 52 U/L (ref 46–116)
ALT SERPL W P-5'-P-CCNC: 46 U/L (ref 12–78)
ANION GAP SERPL CALCULATED.3IONS-SCNC: 3 MMOL/L (ref 4–13)
AST SERPL W P-5'-P-CCNC: 22 U/L (ref 5–45)
BACTERIA UR QL AUTO: ABNORMAL /HPF
BASOPHILS # BLD AUTO: 0.04 THOUSANDS/ΜL (ref 0–0.1)
BASOPHILS NFR BLD AUTO: 1 % (ref 0–1)
BILIRUB SERPL-MCNC: 0.43 MG/DL (ref 0.2–1)
BILIRUB UR QL STRIP: NEGATIVE
BUN SERPL-MCNC: 19 MG/DL (ref 5–25)
CALCIUM SERPL-MCNC: 9.3 MG/DL (ref 8.3–10.1)
CHLORIDE SERPL-SCNC: 109 MMOL/L (ref 100–108)
CHOLEST SERPL-MCNC: 234 MG/DL
CLARITY UR: CLEAR
CO2 SERPL-SCNC: 28 MMOL/L (ref 21–32)
COLOR UR: ABNORMAL
CREAT SERPL-MCNC: 1.18 MG/DL (ref 0.6–1.3)
EOSINOPHIL # BLD AUTO: 0.11 THOUSAND/ΜL (ref 0–0.61)
EOSINOPHIL NFR BLD AUTO: 3 % (ref 0–6)
ERYTHROCYTE [DISTWIDTH] IN BLOOD BY AUTOMATED COUNT: 11.9 % (ref 11.6–15.1)
GFR SERPL CREATININE-BSD FRML MDRD: 70 ML/MIN/1.73SQ M
GLUCOSE P FAST SERPL-MCNC: 98 MG/DL (ref 65–99)
GLUCOSE UR STRIP-MCNC: NEGATIVE MG/DL
HCT VFR BLD AUTO: 43 % (ref 36.5–49.3)
HDLC SERPL-MCNC: 57 MG/DL
HGB BLD-MCNC: 14.4 G/DL (ref 12–17)
HGB UR QL STRIP.AUTO: NEGATIVE
IMM GRANULOCYTES # BLD AUTO: 0.01 THOUSAND/UL (ref 0–0.2)
IMM GRANULOCYTES NFR BLD AUTO: 0 % (ref 0–2)
KETONES UR STRIP-MCNC: NEGATIVE MG/DL
LDLC SERPL CALC-MCNC: 157 MG/DL (ref 0–100)
LEUKOCYTE ESTERASE UR QL STRIP: NEGATIVE
LYMPHOCYTES # BLD AUTO: 1.46 THOUSANDS/ΜL (ref 0.6–4.47)
LYMPHOCYTES NFR BLD AUTO: 38 % (ref 14–44)
MCH RBC QN AUTO: 29.8 PG (ref 26.8–34.3)
MCHC RBC AUTO-ENTMCNC: 33.5 G/DL (ref 31.4–37.4)
MCV RBC AUTO: 89 FL (ref 82–98)
MONOCYTES # BLD AUTO: 0.35 THOUSAND/ΜL (ref 0.17–1.22)
MONOCYTES NFR BLD AUTO: 9 % (ref 4–12)
MUCOUS THREADS UR QL AUTO: ABNORMAL
NEUTROPHILS # BLD AUTO: 1.88 THOUSANDS/ΜL (ref 1.85–7.62)
NEUTS SEG NFR BLD AUTO: 49 % (ref 43–75)
NITRITE UR QL STRIP: NEGATIVE
NON-SQ EPI CELLS URNS QL MICRO: ABNORMAL /HPF
NRBC BLD AUTO-RTO: 0 /100 WBCS
PH UR STRIP.AUTO: 6 [PH]
PLATELET # BLD AUTO: 176 THOUSANDS/UL (ref 149–390)
PMV BLD AUTO: 10.3 FL (ref 8.9–12.7)
POTASSIUM SERPL-SCNC: 4.5 MMOL/L (ref 3.5–5.3)
PROT SERPL-MCNC: 6.8 G/DL (ref 6.4–8.2)
PROT UR STRIP-MCNC: ABNORMAL MG/DL
PSA SERPL-MCNC: 0.8 NG/ML (ref 0–4)
RBC # BLD AUTO: 4.84 MILLION/UL (ref 3.88–5.62)
RBC #/AREA URNS AUTO: ABNORMAL /HPF
SODIUM SERPL-SCNC: 140 MMOL/L (ref 136–145)
SP GR UR STRIP.AUTO: 1.03 (ref 1–1.03)
TRIGL SERPL-MCNC: 101 MG/DL
UROBILINOGEN UR STRIP-ACNC: <2 MG/DL
WBC # BLD AUTO: 3.85 THOUSAND/UL (ref 4.31–10.16)
WBC #/AREA URNS AUTO: ABNORMAL /HPF

## 2022-03-05 PROCEDURE — 80053 COMPREHEN METABOLIC PANEL: CPT

## 2022-03-05 PROCEDURE — 81001 URINALYSIS AUTO W/SCOPE: CPT

## 2022-03-05 PROCEDURE — 36415 COLL VENOUS BLD VENIPUNCTURE: CPT

## 2022-03-05 PROCEDURE — 85025 COMPLETE CBC W/AUTO DIFF WBC: CPT

## 2022-03-05 PROCEDURE — G0103 PSA SCREENING: HCPCS

## 2022-03-05 PROCEDURE — 80061 LIPID PANEL: CPT

## 2022-03-07 ENCOUNTER — TELEPHONE (OUTPATIENT)
Dept: FAMILY MEDICINE CLINIC | Facility: CLINIC | Age: 52
End: 2022-03-07

## 2022-03-09 ENCOUNTER — OFFICE VISIT (OUTPATIENT)
Dept: PHYSICAL THERAPY | Facility: CLINIC | Age: 52
End: 2022-03-09
Payer: COMMERCIAL

## 2022-03-09 DIAGNOSIS — S06.0X0A CONCUSSION WITHOUT LOSS OF CONSCIOUSNESS, INITIAL ENCOUNTER: Primary | ICD-10-CM

## 2022-03-09 PROCEDURE — 97110 THERAPEUTIC EXERCISES: CPT

## 2022-03-09 PROCEDURE — 97140 MANUAL THERAPY 1/> REGIONS: CPT

## 2022-03-09 NOTE — PROGRESS NOTES
Daily Note     Today's date: 3/9/2022  Patient name: Basia Boyd  : 1970  MRN: [id number]  Referring provider: Lenard Nguyễn MD  Dx:   Encounter Diagnosis     ICD-10-CM    1  Concussion without loss of consciousness, initial encounter  S06 0X0A                   Subjective: Patient reports he continues to have pain at his neck, and tightness across his shoulders when he lays flat  He states he gets popping in his neck, especially when looking up  He states "something doesn't feel right" in his neck  He also states that he has had a few instances with getting nauseous, stated it happened when he turned his granddaughter around in her highchair, and another time when picking up blocks from the ground  He states he still cannot focus with his eyes to look at the computer or read on his phone  Objective: See treatment diary below      Assessment: Tolerated treatment  Patient demonstrated fatigue post treatment, exhibited good technique with therapeutic exercises and would benefit from continued PT  Patient is very hesitant with manual therapy techniques and movements of neck  Patient tolerated CPA joint mobs to upper thoracic spine, with soreness post  Patient tolerated rows and low rows and seated cervical retraction without significant pain, was very hesitant to perform cervical retraction, states that with prior therapy this made him significantly worse  Patient did not want to perform TM during session, will attempt next session if able  Patient scored 18/30 on FGA, difficulty with eyes closed situations, HT/HN, turning, and walking backwards  Plan: Continue per plan of care  Add VOR exercises and smooth pursuits next session and TM testing as able  Precautions: cervical radiculopathy      Manuals  3/3 3/9            Manual stretching UT, cervical distraction, jt mobs grade II lat glides Joint mobs lat glides C4-C6 in supine  Prone thoracic spine CPA T2-T6   MFR to bilateral upper trap, lev scap                                                 Neuro Re-Ed             convergence              saccades             Smooth pursuit             vor x 1             Median nerve glides on R UE   Manual, and self-glide x 15 reps          backwards             hurdles             Ther Ex             Cervical retraction supine   Seated x 10           Upper trap stretch             Lev scap stretch             rows   Green 2 x 10          Low rows   Green 2 x 10                                                 Ther Activity             [place] Concussion Treadmill Test NV            FGA NV   18/30          Gait Training                                       Modalities  Concussion handout reviewed, return to activity and symptom severity increase 2/10, reviewed previous HEP, concussion specific info Concussion education, POC                                        [place] Concussion Treadmill Test:   Starting speed is 3 3 mph (modify if needed) and 0% incline    Discontinue test if symptoms on VAS >3   If max incline is reached without symptoms, increase speed to 0 4 mph each minute    BP Pre:  BP Post:     Minutes Incline RPE VAS Heart Rate   1 min 0%   X   2 min 1%      3min  2%   X   4 min  3%      5 min  4%   X   6 min 5%      7 min 6%   X   8 min 7%      9 min 8%   X   10 min 9%      11 min 10%   X   12 min 11%      13 min 12%   X   14 min 13%      15 min  14%   X      Access Code: [email or web address]  URL: [email or web address]  Date: 03/09/2022  Prepared by: Irma Murrell    Exercises  · Standing Bilateral Low Shoulder Row with Anchored Resistance - 1 x daily - 5 x weekly - 3 sets - 10 reps  · Shoulder Extension with Resistance - 1 x daily - 5 x weekly - 3 sets - 10 reps  · Seated Passive Cervical Retraction - 1 x daily - 5 x weekly - 10 reps

## 2022-03-10 ENCOUNTER — OFFICE VISIT (OUTPATIENT)
Dept: PHYSICAL THERAPY | Facility: CLINIC | Age: 52
End: 2022-03-10
Payer: COMMERCIAL

## 2022-03-10 DIAGNOSIS — S06.0X0A CONCUSSION WITHOUT LOSS OF CONSCIOUSNESS, INITIAL ENCOUNTER: Primary | ICD-10-CM

## 2022-03-10 PROCEDURE — 97112 NEUROMUSCULAR REEDUCATION: CPT

## 2022-03-10 PROCEDURE — 97140 MANUAL THERAPY 1/> REGIONS: CPT

## 2022-03-10 PROCEDURE — 97110 THERAPEUTIC EXERCISES: CPT

## 2022-03-10 NOTE — PROGRESS NOTES
Daily Note     Today's date: 3/10/2022  Patient name: Alonso Moerno  : 1970  MRN: [id number]  Referring provider: Jennifer Suazo MD  Dx:   Encounter Diagnosis     ICD-10-CM    1  Concussion without loss of consciousness, initial encounter  S06 0X0A                   Subjective: Patient reports that he had to walk here from [facility] and had some increase in his headache when going uphill, headache went from a 4/10 to 6/10  Objective: See treatment diary below      Assessment: Tolerated treatment fair  Patient demonstrated fatigue post treatment, exhibited good technique with therapeutic exercises and would benefit from continued PT  BP elevated at start of session, did not perform [place] Concussion Test due to /98, was 168/80 later in session  Decreased tingling with median nerve glide on R UE  Continue to educate patient on not avoiding activities that bring on symptoms entirely (headache and nausea) and trying to tolerate to manageable levels (symptom severity increase of 2/10)  VOR increased eye strain  Tolerated improved manual therapy techniques during session  Continue to progress as tolerated  Plan: Continue per plan of care  Precautions: cervical radiculopathy      Manuals  3/3 3 3/10         Manual stretching UT, cervical distraction, jt mobs grade II lat glides Joint mobs lat glides C4-C6 in supine  Prone thoracic spine CPA T2-T6  MFR to bilateral upper trap, lev scap Joint mobs C5-C6 L sidelying  Prone thoracic spine UPA on L T2-T6   MFR to upper trap and lev stap on R side in sitting                                        Neuro Re-Ed           convergence    Reviewed for HEP        saccades    Reviewed for HEP       Smooth pursuit           vor x 1    Horizontal, standing, plain 30" x 3       Median nerve glides on R UE   Manual, and self-glide x 15 reps Manual and self sitting x 10 to review for HEP       backwards           hurdles           Ther Ex Cervical retraction supine   Seated x 10  Seated x 10       Upper trap stretch    30" x 3       Open book    5" x 10 each       Lev scap stretch    30" x 3       rows   Green 2 x 10        Low rows   Green 2 x 10                                         Ther Activity           [place] Concussion Treadmill Test NV   Not assessed due to elevated BP       FGA NV   18/30        Gait Training                                 Modalities  Concussion handout reviewed, return to activity and symptom severity increase 2/10, reviewed previous HEP, concussion specific info Concussion education, POC Symptom severity                                 [place] Concussion Treadmill Test:   Starting speed is 3 3 mph (modify if needed) and 0% incline    Discontinue test if symptoms on VAS >3   If max incline is reached without symptoms, increase speed to 0 4 mph each minute    BP Pre: 170/98  BP Post:     Minutes Incline RPE VAS Heart Rate   1 min 0%   X   2 min 1%      3min  2%   X   4 min  3%      5 min  4%   X   6 min 5%      7 min 6%   X   8 min 7%      9 min 8%   X   10 min 9%      11 min 10%   X   12 min 11%      13 min 12%   X   14 min 13%      15 min  14%   X      Access Code: [email or web address]  URL: [email or web address]  Date: 03/10/2022  Prepared by: Haddad Dittboone    Exercises  · Standing Bilateral Low Shoulder Row with Anchored Resistance - 1 x daily - 5 x weekly - 3 sets - 10 reps  · Shoulder Extension with Resistance - 1 x daily - 5 x weekly - 3 sets - 10 reps  · Seated Passive Cervical Retraction - 1 x daily - 5 x weekly - 10 reps  · Seated Gentle Upper Trapezius Stretch - 1 x daily - 7 x weekly - 3 sets - 30 hold  · Gentle Levator Scapulae Stretch - 1 x daily - 7 x weekly - 3 sets - 30 hold  · Standing Median Nerve Glide - 1 x daily - 7 x weekly - 3 sets - 10 reps

## 2022-03-17 ENCOUNTER — OFFICE VISIT (OUTPATIENT)
Dept: PHYSICAL THERAPY | Facility: CLINIC | Age: 52
End: 2022-03-17
Payer: COMMERCIAL

## 2022-03-17 DIAGNOSIS — S06.0X0A CONCUSSION WITHOUT LOSS OF CONSCIOUSNESS, INITIAL ENCOUNTER: Primary | ICD-10-CM

## 2022-03-17 PROCEDURE — 97110 THERAPEUTIC EXERCISES: CPT

## 2022-03-17 PROCEDURE — 97140 MANUAL THERAPY 1/> REGIONS: CPT

## 2022-03-17 NOTE — PROGRESS NOTES
Daily Note     Today's date: 3/17/2022  Patient name: Kristian Forrester  : 1970  MRN: [id number]  Referring provider: Yojana Singh MD  Dx:   Encounter Diagnosis     ICD-10-CM    1  Concussion without loss of consciousness, initial encounter  S06 0X0A                   Subjective: Patient reports that earlier this week his neck was really bothering him and back of head, and then he was having some nausea  He states that he took his anxiety medication today  He states that he feels like sometimes the pain in the neck triggers his anxiety  He states that he brown pain and throbbing in his head 10/10 at start of session      Objective: See treatment diary below  170/90 at start of session    Assessment:   Tolerated treatment fair  Patient demonstrated fatigue post treatment, exhibited good technique with therapeutic exercises and would benefit from continued PT  Patient with decreased pain in her head post manual therapy  Continues to require cues for decreased upper trap activation during rows  Patient reported he felt a little "off", but stated that it was most likely due to the medication  Continued education on attempting to return to activities within symptom increase of >2/10  Continue to progress as tolerated  Plan: Continue per plan of care  Precautions: cervical radiculopathy      Manuals  3/3 3/9 3/10 3/17        Manual stretching UT, cervical distraction, jt mobs grade II lat glides Joint mobs lat glides C4-C6 in supine  Prone thoracic spine CPA T2-T6  MFR to bilateral upper trap, lev scap Joint mobs C5-C6 L sidelying  Prone thoracic spine UPA on L T2-T6   MFR to upper trap and lev stap on R side in sitting Seated MFR to upper traps, jt mobs to T3-T4                                       Neuro Re-Ed           convergence    Reviewed for HEP        saccades    Reviewed for HEP       Smooth pursuit           vor x 1    Horizontal, standing, plain 30" x 3 horizontal/vertical, plain 30" x 3      Median nerve glides on R UE   Manual, and self-glide x 15 reps Manual and self sitting x 10 to review for HEP       backwards           hurdles           Ther Ex           Cervical retraction supine   Seated x 10  Seated x 10 Seated x 10      Upper trap stretch    30" x 3       Open book    5" x 10 each       Lev scap stretch    30" x 3       rows   Green 2 x 10  Green x 30      Low rows   Green 2 x 10  Green x 30      Upper thoracic extension     5" x 15      pec doorway stretch     30" x 2      D2 ext TB     Green x 15      Ther Activity           [place] Concussion Treadmill Test NV   Not assessed due to elevated BP       FGA NV   18/30        Gait Training                                 Modalities  Concussion handout reviewed, return to activity and symptom severity increase 2/10, reviewed previous HEP, concussion specific info Concussion education, POC Symptom severity                                 [place] Concussion Treadmill Test:   Starting speed is 3 3 mph (modify if needed) and 0% incline    Discontinue test if symptoms on VAS >3   If max incline is reached without symptoms, increase speed to 0 4 mph each minute    BP Pre: 170/98  BP Post:     Minutes Incline RPE VAS Heart Rate   1 min 0%   X   2 min 1%      3min  2%   X   4 min  3%      5 min  4%   X   6 min 5%      7 min 6%   X   8 min 7%      9 min 8%   X   10 min 9%      11 min 10%   X   12 min 11%      13 min 12%   X   14 min 13%      15 min  14%   X      Access Code: [email or web address]  URL: [email or web address]  Date: 03/10/2022  Prepared by: Trina Reed    Exercises  · Standing Bilateral Low Shoulder Row with Anchored Resistance - 1 x daily - 5 x weekly - 3 sets - 10 reps  · Shoulder Extension with Resistance - 1 x daily - 5 x weekly - 3 sets - 10 reps  · Seated Passive Cervical Retraction - 1 x daily - 5 x weekly - 10 reps  · Seated Gentle Upper Trapezius Stretch - 1 x daily - 7 x weekly - 3 sets - 30 hold  · Gentle Levator Scapulae Stretch - 1 x daily - 7 x weekly - 3 sets - 30 hold  · Standing Median Nerve Glide - 1 x daily - 7 x weekly - 3 sets - 10 reps

## 2022-03-24 ENCOUNTER — OFFICE VISIT (OUTPATIENT)
Dept: PHYSICAL THERAPY | Facility: CLINIC | Age: 52
End: 2022-03-24
Payer: COMMERCIAL

## 2022-03-24 DIAGNOSIS — S06.0X0A CONCUSSION WITHOUT LOSS OF CONSCIOUSNESS, INITIAL ENCOUNTER: Primary | ICD-10-CM

## 2022-03-24 PROCEDURE — 97140 MANUAL THERAPY 1/> REGIONS: CPT

## 2022-03-24 PROCEDURE — 97110 THERAPEUTIC EXERCISES: CPT

## 2022-03-24 NOTE — PROGRESS NOTES
Daily Note     Today's date: 3/24/2022  Patient name: Kristian Forrester  : 1970  MRN: [id number]  Referring provider: Yojana Singh MD  Dx:   Encounter Diagnosis     ICD-10-CM    1  Concussion without loss of consciousness, initial encounter  S06 0X0A                   Subjective: Patient reports 1 instance of nausea since last session, was on on the floor looking up at the tv  States that he went for an eye test the other day, states that he has glaucoma  He states the vision is getting better  He states that the neck feels looser  He states that most things have been improving since starting therapy, getting less intense  Objective: See treatment diary below  172/80 post manual therapy    Assessment:   Tolerated treatment fair  Patient demonstrated fatigue post treatment, exhibited good technique with therapeutic exercises and would benefit from continued PT  Patient still presents with increased BP during session, limited exercises to prevent increase in BP  Spoke with patient about reaching out to primary care doctor about increased BP, will send message to doctor  Patient with decreased pain and pressure in head post session  Significant tenderness at R rib head, decreased post, added serratus anterior strengthening  Continue to progress as tolerated  Plan: Continue per plan of care  Precautions: cervical radiculopathy      Manuals 2/16 3/3 3/9 3/10 3/17 3/24       Manual stretching UT, cervical distraction, jt mobs grade II lat glides Joint mobs lat glides C4-C6 in supine  Prone thoracic spine CPA T2-T6  MFR to bilateral upper trap, lev scap Joint mobs C5-C6 L sidelying  Prone thoracic spine UPA on L T2-T6   MFR to upper trap and lev stap on R side in sitting Seated MFR to upper traps, jt mobs to T3-T4 Seated MFR to upper traps, jt mobs to T4-T6  MET for posterior rib on R                                       Neuro Re-Ed           convergence    Reviewed for HEP        saccades Reviewed for HEP       Smooth pursuit           vor x 1    Horizontal, standing, plain 30" x 3 horizontal/vertical, plain 30" x 3      Median nerve glides on R UE   Manual, and self-glide x 15 reps Manual and self sitting x 10 to review for HEP  Self with sitting x 10     backwards           hurdles           Ther Ex           Cervical retraction supine   Seated x 10  Seated x 10 Seated x 10      Upper trap stretch    30" x 3       Open book    5" x 10 each       Lev scap stretch    30" x 3       rows   Green 2 x 10  Green x 30      Low rows   Green 2 x 10  Green x 30      Upper thoracic extension     5" x 15      pec doorway stretch     30" x 2            Cervical snags 5" x 10 each, extension x 15 reps     Thoracic extension      Thoracic extension standing over foam roller x 30 reps     Serratus anterior      "hugs" green TB x 20,   Red weighted ball CW/CCW x 20 each direction on each     D2 ext TB     Green x 15 Green x 15 each     Ther Activity           [place] Concussion Treadmill Test NV   Not assessed due to elevated BP       FGA NV   18/30        Gait Training                                 Modalities  Concussion handout reviewed, return to activity and symptom severity increase 2/10, reviewed previous HEP, concussion specific info Concussion education, POC Symptom severity                                 [place] Concussion Treadmill Test:   Starting speed is 3 3 mph (modify if needed) and 0% incline    Discontinue test if symptoms on VAS >3   If max incline is reached without symptoms, increase speed to 0 4 mph each minute    BP Pre: 170/98  BP Post:     Minutes Incline RPE VAS Heart Rate   1 min 0%   X   2 min 1%      3min  2%   X   4 min  3%      5 min  4%   X   6 min 5%      7 min 6%   X   8 min 7%      9 min 8%   X   10 min 9%      11 min 10%   X   12 min 11%      13 min 12%   X   14 min 13%      15 min  14%   X      Access Code: [email or web address]  URL: [email or web address]  Date: 03/24/2022  Prepared by: Carrol Miller    Exercises  · Standing Bilateral Low Shoulder Row with Anchored Resistance - 1 x daily - 5 x weekly - 3 sets - 10 reps  · Shoulder Extension with Resistance - 1 x daily - 5 x weekly - 3 sets - 10 reps  · Seated Passive Cervical Retraction - 1 x daily - 5 x weekly - 10 reps  · Seated Gentle Upper Trapezius Stretch - 1 x daily - 7 x weekly - 3 sets - 30 hold  · Gentle Levator Scapulae Stretch - 1 x daily - 7 x weekly - 3 sets - 30 hold  · Standing Median Nerve Glide - 1 x daily - 7 x weekly - 3 sets - 10 reps  · Seated Assisted Cervical Rotation with Towel - 1 x daily - 7 x weekly - 2 sets - 10 reps - 5 hold  · Cervical Extension AROM with Strap - 1 x daily - 7 x weekly - 2 sets - 10 reps  · Dynamic Hug with Resistance - 1 x daily - 7 x weekly - 2 sets - 10 reps  · Standing Shoulder Single Arm PNF D2 Flexion with Resistance - 1 x daily - 5 x weekly - 2 sets - 10 reps

## 2022-03-25 ENCOUNTER — OFFICE VISIT (OUTPATIENT)
Dept: FAMILY MEDICINE CLINIC | Facility: CLINIC | Age: 52
End: 2022-03-25
Payer: COMMERCIAL

## 2022-03-25 VITALS
WEIGHT: 238.8 LBS | SYSTOLIC BLOOD PRESSURE: 158 MMHG | RESPIRATION RATE: 16 BRPM | HEART RATE: 80 BPM | BODY MASS INDEX: 33.43 KG/M2 | DIASTOLIC BLOOD PRESSURE: 84 MMHG | HEIGHT: 71 IN

## 2022-03-25 DIAGNOSIS — S06.0X0D CONCUSSION WITHOUT LOSS OF CONSCIOUSNESS, SUBSEQUENT ENCOUNTER: ICD-10-CM

## 2022-03-25 DIAGNOSIS — E78.2 MIXED HYPERLIPIDEMIA: ICD-10-CM

## 2022-03-25 DIAGNOSIS — I10 PRIMARY HYPERTENSION: Primary | ICD-10-CM

## 2022-03-25 DIAGNOSIS — F41.9 ANXIETY: ICD-10-CM

## 2022-03-25 PROCEDURE — 3008F BODY MASS INDEX DOCD: CPT | Performed by: FAMILY MEDICINE

## 2022-03-25 PROCEDURE — 99214 OFFICE O/P EST MOD 30 MIN: CPT | Performed by: FAMILY MEDICINE

## 2022-03-25 PROCEDURE — 3725F SCREEN DEPRESSION PERFORMED: CPT | Performed by: FAMILY MEDICINE

## 2022-03-25 PROCEDURE — 3079F DIAST BP 80-89 MM HG: CPT | Performed by: FAMILY MEDICINE

## 2022-03-25 PROCEDURE — 1036F TOBACCO NON-USER: CPT | Performed by: FAMILY MEDICINE

## 2022-03-25 PROCEDURE — 3077F SYST BP >= 140 MM HG: CPT | Performed by: FAMILY MEDICINE

## 2022-03-25 RX ORDER — LOSARTAN POTASSIUM 100 MG/1
100 TABLET ORAL DAILY
Qty: 30 TABLET | Refills: 2 | Status: SHIPPED | OUTPATIENT
Start: 2022-03-25

## 2022-03-25 NOTE — ASSESSMENT & PLAN NOTE
Patient is currently on Cozaar 50  Will increase to 100  Blood pressure is clearly not responding to the 50 mg increase yet  Re-evaluate in approximately 1 month to 6 weeks, and consider adding hydrochlorothiazide at that point

## 2022-03-25 NOTE — PATIENT INSTRUCTIONS
Problem List Items Addressed This Visit     Anxiety     Stable  Using atarax PRN  Concussion     Patient is still in physical therapy for concussion program   No change at the moment  Continue with PT  Still has some residual symptoms from the concussion such as nausea  Did not get the MRI  He reports that the radiology team told him that he needed bp doctor, and labs, and that they would cancel  No note was ever sent to this office about this  GFR was excellent, and I have increased the losartan  Hyperlipidemia     Lipids are elevated  He is paying attention to the diet  Reviewed about medications  Patient would prefer to not take statins yet  LDL cholesterol was definitely elevated  Since he does not wish to take medications, would recommend that he limit fried foods, fatty foods, beef, pork  He would have a significant amount of eggs weekly previously, therefore I would recommend that he change to egg whites, with perhaps 1 or 2 egg yolks per week  Relevant Orders    Comprehensive metabolic panel    Lipid panel    Hypertension - Primary     Patient is currently on Cozaar 50  Will increase to 100  Blood pressure is clearly not responding to the 50 mg increase yet  Re-evaluate in approximately 1 month to 6 weeks, and consider adding hydrochlorothiazide at that point  Relevant Medications    losartan (COZAAR) 100 MG tablet    Other Relevant Orders    Comprehensive metabolic panel          COVID 19 Instructions    Santos Santos Sr  was advised to limit contact with others to essential tasks such as getting food, medications, and medical care  Proper handwashing reviewed, and Hand sanitzer when washing is not available  If the patient develops symptoms of COVID 19, the patient should call the office as soon as possible  For 0361-1022 Flu season, it is strongly recommended that Flu Vaccinations be obtained        Virtual Visits:  Mac: This works on smart phones (any phone with Internet browsing capability)  You should get a text message when the provider is ready to see you  Click on the link in the text message, and the call should start  You will need to type in your name, and allow camera and microphone access  This is HIPPA compliant, and secure  If you have not already done so, get immunized to COVID 19  We are committed to getting you vaccinated as soon as possible and will be closely following CDC and [facility]  guidelines as they are released and revised  Please refer to our COVID-19 vaccine webpage for the most up to date information on the vaccine and our distribution efforts  This site will also have the most up to date recommendations for COVID booster vaccine  CaliherAnyi rodríguez    Call [phone] ([phone]), option [phone]    OUR NEW LOCATION:    [address] [place], [place], [address]  Fax: [phone]    Lab services and OB/GYN are at this location as well

## 2022-03-25 NOTE — ASSESSMENT & PLAN NOTE
Lipids are elevated  He is paying attention to the diet  Reviewed about medications  Patient would prefer to not take statins yet  LDL cholesterol was definitely elevated  Since he does not wish to take medications, would recommend that he limit fried foods, fatty foods, beef, pork  He would have a significant amount of eggs weekly previously, therefore I would recommend that he change to egg whites, with perhaps 1 or 2 egg yolks per week

## 2022-03-25 NOTE — PROGRESS NOTES
Assessment and Plan:    Problem List Items Addressed This Visit     Anxiety     Stable  Using atarax PRN  Concussion     Patient is still in physical therapy for concussion program   No change at the moment  Continue with PT  Still has some residual symptoms from the concussion such as nausea  Did not get the MRI  He reports that the radiology team told him that he needed bp doctor, and labs, and that they would cancel  No note was ever sent to this office about this  GFR was excellent, and I have increased the losartan  Hyperlipidemia     Lipids are elevated  He is paying attention to the diet  Reviewed about medications  Patient would prefer to not take statins yet  LDL cholesterol was definitely elevated  Since he does not wish to take medications, would recommend that he limit fried foods, fatty foods, beef, pork  He would have a significant amount of eggs weekly previously, therefore I would recommend that he change to egg whites, with perhaps 1 or 2 egg yolks per week  Relevant Orders    Comprehensive metabolic panel    Lipid panel    Hypertension - Primary     Patient is currently on Cozaar 50  Will increase to 100  Blood pressure is clearly not responding to the 50 mg increase yet  Re-evaluate in approximately 1 month to 6 weeks, and consider adding hydrochlorothiazide at that point  Relevant Medications    losartan (COZAAR) 100 MG tablet    Other Relevant Orders    Comprehensive metabolic panel                 Diagnoses and all orders for this visit:    Primary hypertension  -     losartan (COZAAR) 100 MG tablet; Take 1 tablet (100 mg total) by mouth daily  -     Comprehensive metabolic panel; Future    Concussion without loss of consciousness, subsequent encounter    Anxiety    Mixed hyperlipidemia  -     Comprehensive metabolic panel; Future  -     Lipid panel; Future              Subjective:      Patient ID: Bernadette Whelanruth Kelley  is a 45 y o  male     CC:    Chief Complaint   Patient presents with    Hypertension     pt here due to his elevated bp, pt states his bp has been high the last few weeks per [facility] on [address] in [facility]       HPI:    Here for follow up of bp  He has been in PT  Reviewed BP with this  Has been quite a bit elevated  Concussion: He is in PT  Still has occasional nausea  Has been better now  Has babout every 5 days now  Anxiety: He is using atarax  Has been OK  The following portions of the patient's history were reviewed and updated as appropriate: allergies, current medications, past family history, past medical history, past social history, past surgical history and problem list       Review of Systems   Constitutional: Negative  HENT: Negative  Respiratory: Negative  Cardiovascular: Negative  Gastrointestinal: Negative  Neurological: Positive for dizziness  Hematological: Negative  All other systems reviewed and are negative  Data to review:       Objective:    Vitals:    03/25/22 1007   BP: 158/84   BP Location: Left arm   Patient Position: Sitting   Cuff Size: Large   Pulse: 80   Resp: 16   Weight: 108 kg (238 lb 12 8 oz)   Height: 5' 11" (1 803 m)        Physical Exam  Vitals and nursing note reviewed  Constitutional:       Appearance: Normal appearance  Neck:      Vascular: No carotid bruit  Cardiovascular:      Rate and Rhythm: Normal rate and regular rhythm  Pulses: Normal pulses  Carotid pulses are 2+ on the right side and 2+ on the left side  Heart sounds: Normal heart sounds  No murmur heard  No gallop  Pulmonary:      Effort: Pulmonary effort is normal  No respiratory distress  Breath sounds: Normal breath sounds  No stridor  No wheezing, rhonchi or rales  Chest:      Chest wall: No tenderness  Neurological:      Mental Status: He is alert

## 2022-03-25 NOTE — ASSESSMENT & PLAN NOTE
Patient is still in physical therapy for concussion program   No change at the moment  Continue with PT  Still has some residual symptoms from the concussion such as nausea  Did not get the MRI  He reports that the radiology team told him that he needed bp doctor, and labs, and that they would cancel  No note was ever sent to this office about this  GFR was excellent, and I have increased the losartan

## 2022-03-28 ENCOUNTER — RA CDI HCC (OUTPATIENT)
Dept: OTHER | Facility: HOSPITAL | Age: 52
End: 2022-03-28

## 2022-03-28 NOTE — PROGRESS NOTES
[facility] 75  coding opportunities       Chart reviewed, no opportunity found: CHART REVIEWED, NO OPPORTUNITY FOUND        Patients Insurance        Commercial Insurance: [employer]

## 2022-03-30 ENCOUNTER — OFFICE VISIT (OUTPATIENT)
Dept: PHYSICAL THERAPY | Facility: CLINIC | Age: 52
End: 2022-03-30
Payer: COMMERCIAL

## 2022-03-30 DIAGNOSIS — S06.0X0A CONCUSSION WITHOUT LOSS OF CONSCIOUSNESS, INITIAL ENCOUNTER: Primary | ICD-10-CM

## 2022-03-30 PROCEDURE — 97112 NEUROMUSCULAR REEDUCATION: CPT

## 2022-03-30 PROCEDURE — 97140 MANUAL THERAPY 1/> REGIONS: CPT
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
## 2022-04-05 ENCOUNTER — APPOINTMENT (OUTPATIENT)
Dept: PHYSICAL THERAPY | Facility: CLINIC | Age: 52
End: 2022-04-05
Payer: COMMERCIAL

## 2022-04-06 ENCOUNTER — APPOINTMENT (OUTPATIENT)
Dept: PHYSICAL THERAPY | Facility: CLINIC | Age: 52
End: 2022-04-06
Payer: COMMERCIAL

## 2022-04-06 ENCOUNTER — OFFICE VISIT (OUTPATIENT)
Dept: PHYSICAL THERAPY | Facility: CLINIC | Age: 52
End: 2022-04-06
Payer: COMMERCIAL

## 2022-04-06 DIAGNOSIS — S06.0X0A CONCUSSION WITHOUT LOSS OF CONSCIOUSNESS, INITIAL ENCOUNTER: Primary | ICD-10-CM

## 2022-04-06 PROCEDURE — 97110 THERAPEUTIC EXERCISES: CPT | Performed by: PHYSICAL THERAPIST

## 2022-04-06 PROCEDURE — 97140 MANUAL THERAPY 1/> REGIONS: CPT | Performed by: PHYSICAL THERAPIST
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
## 2022-04-07 ENCOUNTER — APPOINTMENT (OUTPATIENT)
Dept: PHYSICAL THERAPY | Facility: CLINIC | Age: 52
End: 2022-04-07
Payer: COMMERCIAL

## 2022-04-08 ENCOUNTER — OFFICE VISIT (OUTPATIENT)
Dept: PHYSICAL THERAPY | Facility: CLINIC | Age: 52
End: 2022-04-08
Payer: COMMERCIAL

## 2022-04-08 DIAGNOSIS — S06.0X0A CONCUSSION WITHOUT LOSS OF CONSCIOUSNESS, INITIAL ENCOUNTER: Primary | ICD-10-CM

## 2022-04-08 PROCEDURE — 97140 MANUAL THERAPY 1/> REGIONS: CPT

## 2022-04-08 PROCEDURE — 97110 THERAPEUTIC EXERCISES: CPT

## 2022-04-08 PROCEDURE — 97112 NEUROMUSCULAR REEDUCATION: CPT

## 2022-04-08 NOTE — PROGRESS NOTES
Daily Note     Today's date: 2022  Patient name: Marj Garcias  : 1970  MRN: [id number]  Referring provider: Sulaiman Mercedes MD  Dx:   Encounter Diagnosis     ICD-10-CM    1  Concussion without loss of consciousness, initial encounter  S06 0X0A                   Subjective: Patient reports that after last session he had a bit of a headache and neck pain after last session  He reports pain in between his shoulder blades today  He states that the prone exercises made him feel nauseous  He states that the nausea is not happening as often  Objective: See treatment diary below  168/88 at start of session  140/80 post manual      HR Max: 168  60%   80%     Assessment: Tolerated treatment well  Patient demonstrated fatigue post treatment, exhibited good technique with therapeutic exercises and would benefit from continued PT  Patient with elevated BP at start of session, decreased post manual therapy  Patient tolerated IASTM to bilateral upper trap, rhomboids, and lev scap well with decreased pain post  Was able to perform treadmill up to 6% incline, HR up to 130bpm without any increase in baseline symptoms  Added VOR exercises while walking in hallway, patient reported blurring of letter with head movements to the R side  Continue to progress as tolerated  Plan: Continue per plan of care  Add additional VOR exercises next session, including vertical eye/head movements  Precautions: cervical radiculopathy      Manuals  3/3 3/9 3/10 3/17 3/24 3/30 4/6 4/8     Manual stretching UT, cervical distraction, jt mobs grade II lat glides Joint mobs lat glides C4-C6 in supine  Prone thoracic spine CPA T2-T6  MFR to bilateral upper trap, lev scap Joint mobs C5-C6 L sidelying  Prone thoracic spine UPA on L T2-T6   MFR to upper trap and lev stap on R side in sitting Seated MFR to upper traps, jt mobs to T3-T4 Seated MFR to upper traps, jt mobs to T4-T6  MET for posterior rib on R  Seated MFR to upper trap, jt mobs to T4-T6 grade II, MFR to L rhomboids  MFR to bilateral suboccipitals and cervical paraspinals    scap mobs in prone on L    IASTM in sitting to bilateral upper trap and lev scap                                       Neuro Re-Ed            convergence    Reviewed for HEP         saccades    Reviewed for HEP        Smooth pursuit            vor x 1    Horizontal, standing, plain 30" x 3 horizontal/vertical, plain 30" x 3       Median nerve glides on R UE   Manual, and self-glide x 15 reps Manual and self sitting x 10 to review for HEP  Self with sitting x 10      backwards            scap stabilization       Prone I's, W's 2 x 10  Shoulder depression 2 x 10, T's x 10     VOR         VOR x 1 in hallway x 3 laps   hurdles            Ther Ex            Cervical retraction supine   Seated x 10  Seated x 10 Seated x 10       Upper trap stretch    30" x 3        Open book    5" x 10 each        Lev scap stretch    30" x 3        rows   Green 2 x 10  Green x 30       Low rows   Green 2 x 10  Green x 30    Bent-over row 5# x 20 each   Upper thoracic extension     5" x 15       pec doorway stretch     30" x 2             Cervical snags 5" x 10 each, extension x 15 reps   "farmer's carry" with 5# on each side, emphasis on scap depression x 3 laps hallway   Thoracic extension      Thoracic extension standing over foam roller x 30 reps      Serratus anterior      "hugs" green TB x 20,   Red weighted ball CW/CCW x 20 each direction on each      D2 ext TB     Green x 15 Green x 15 each      Ther Activity            [place] Concussion Treadmill Test NV   Not assessed due to elevated BP     3  3mph for 2 minutes, up to 3% at 3 minutes (), 5% at 5 minutes 6% at 7 minutes  HR up to 128  Total of 18 minutes completed   HR max 130 bpm          Lifting mechanics reviewed with box lifts and octagonal bolster lift     FGA NV   18/30         Gait Training                                    Modalities Concussion handout reviewed, return to activity and symptom severity increase 2/10, reviewed previous HEP, concussion specific info Concussion education, POC Symptom severity                                         Access Code: [email or web address]  URL: [email or web address]  Date: 03/24/2022  Prepared by: Clarence Meeks    Exercises  · Standing Bilateral Low Shoulder Row with Anchored Resistance - 1 x daily - 5 x weekly - 3 sets - 10 reps  · Shoulder Extension with Resistance - 1 x daily - 5 x weekly - 3 sets - 10 reps  · Seated Passive Cervical Retraction - 1 x daily - 5 x weekly - 10 reps  · Seated Gentle Upper Trapezius Stretch - 1 x daily - 7 x weekly - 3 sets - 30 hold  · Gentle Levator Scapulae Stretch - 1 x daily - 7 x weekly - 3 sets - 30 hold  · Standing Median Nerve Glide - 1 x daily - 7 x weekly - 3 sets - 10 reps  · Seated Assisted Cervical Rotation with Towel - 1 x daily - 7 x weekly - 2 sets - 10 reps - 5 hold  · Cervical Extension AROM with Strap - 1 x daily - 7 x weekly - 2 sets - 10 reps  · Dynamic Hug with Resistance - 1 x daily - 7 x weekly - 2 sets - 10 reps  · Standing Shoulder Single Arm PNF D2 Flexion with Resistance - 1 x daily - 5 x weekly - 2 sets - 10 reps

## 2022-04-14 ENCOUNTER — OFFICE VISIT (OUTPATIENT)
Dept: PHYSICAL THERAPY | Facility: CLINIC | Age: 52
End: 2022-04-14
Payer: COMMERCIAL

## 2022-04-14 DIAGNOSIS — S06.0X0A CONCUSSION WITHOUT LOSS OF CONSCIOUSNESS, INITIAL ENCOUNTER: Primary | ICD-10-CM

## 2022-04-14 PROCEDURE — 97112 NEUROMUSCULAR REEDUCATION: CPT

## 2022-04-14 PROCEDURE — 97140 MANUAL THERAPY 1/> REGIONS: CPT
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
## 2022-04-18 ENCOUNTER — RA CDI HCC (OUTPATIENT)
Dept: OTHER | Facility: HOSPITAL | Age: 52
End: 2022-04-18

## 2022-04-18 NOTE — PROGRESS NOTES
[facility], [place] [facility]  coding opportunities       Chart reviewed, no opportunity found: CHART REVIEWED, NO OPPORTUNITY FOUND        Patients Insurance        Commercial Insurance: [employer]

## 2022-04-19 ENCOUNTER — APPOINTMENT (OUTPATIENT)
Dept: PHYSICAL THERAPY | Facility: CLINIC | Age: 52
End: 2022-04-19
Payer: COMMERCIAL

## 2022-04-20 ENCOUNTER — OFFICE VISIT (OUTPATIENT)
Dept: PHYSICAL THERAPY | Facility: CLINIC | Age: 52
End: 2022-04-20
Payer: COMMERCIAL

## 2022-04-20 DIAGNOSIS — S06.0X0A CONCUSSION WITHOUT LOSS OF CONSCIOUSNESS, INITIAL ENCOUNTER: Primary | ICD-10-CM

## 2022-04-20 PROCEDURE — 97140 MANUAL THERAPY 1/> REGIONS: CPT

## 2022-04-20 PROCEDURE — 97112 NEUROMUSCULAR REEDUCATION: CPT

## 2022-04-20 NOTE — PROGRESS NOTES
Daily Note     Today's date: 2022  Patient name: Devon Irene  : 1970  MRN: [id number]  Referring provider: Zenobia Luna MD  Dx:   Encounter Diagnosis     ICD-10-CM    1  Concussion without loss of consciousness, initial encounter  S06 0X0A        Start Time: 1210  Stop Time: 1305  Total time in clinic (min): 55 minutes    Subjective: Patient reports his neck is feeling better today  Was a little sore after last session but felt better the next day or so  Objective: See treatment diary below      Assessment: Tolerated treatment well  Patient demonstrated fatigue post treatment, exhibited good technique with therapeutic exercises and would benefit from continued PT  Patient with sensitivity with cervical joint mobilizations, especially to R C4, with high symptom irritability  Performed IASTM in sitting to upper trap and lev scap with improved tolerance  Added additional VOR and oculomotor tracking exercises during session, patient with "funny feeling" especially moving to the R side and up  Gave self ball-toss from hand to hand and up/down as part of HEP  Continue to progress as tolerated  Plan: Continue per plan of care  Precautions: cervical radiculopathy      Manuals  3/3 3/9 3/10 3/17 3/24 3/30 4/6 4/8 4/20     Manual stretching UT, cervical distraction, jt mobs grade II lat glides Joint mobs lat glides C4-C6 in supine  Prone thoracic spine CPA T2-T6  MFR to bilateral upper trap, lev scap Joint mobs C5-C6 L sidelying  Prone thoracic spine UPA on L T2-T6  MFR to upper trap and lev stap on R side in sitting Seated MFR to upper traps, jt mobs to T3-T4 Seated MFR to upper traps, jt mobs to T4-T6  MET for posterior rib on R  Seated MFR to upper trap, jt mobs to T4-T6 grade II, MFR to L rhomboids   MFR to bilateral suboccipitals and cervical paraspinals    scap mobs in prone on L    IASTM in sitting to bilateral upper trap and lev scap IASTM in sitting to bilateral upper trap and lev scap    MFR supine to bilateral upper trap                                           Neuro Re-Ed             convergence    Reviewed for HEP          saccades    Reviewed for HEP         Smooth pursuit             vor x 1    Horizontal, standing, plain 30" x 3 horizontal/vertical, plain 30" x 3        Median nerve glides on R UE   Manual, and self-glide x 15 reps Manual and self sitting x 10 to review for HEP  Self with sitting x 10       backwards             scap stabilization       Prone I's, W's 2 x 10  Shoulder depression 2 x 10, T's x 10      VOR         VOR x 1 in hallway x 3 laps VOR x 1 plus accomodation 30" x 3 each horizontal/vertical                 Walking self toss side to side hallway 50' x 2, up/down 50' x 2  Pass behind x 10 each   hurdles             Ther Ex             Cervical retraction supine   Seated x 10  Seated x 10 Seated x 10        Upper trap stretch    30" x 3         Open book    5" x 10 each         Lev scap stretch    30" x 3         rows   Green 2 x 10  Green x 30        Low rows   Green 2 x 10  Green x 30    Bent-over row 5# x 20 each    Upper thoracic extension     5" x 15        pec doorway stretch     30" x 2              Cervical snags 5" x 10 each, extension x 15 reps   "farmer's carry" with 5# on each side, emphasis on scap depression x 3 laps hallway    Thoracic extension      Thoracic extension standing over foam roller x 30 reps       Serratus anterior      "hugs" green TB x 20,   Red weighted ball CW/CCW x 20 each direction on each       D2 ext TB     Green x 15 Green x 15 each       Ther Activity             [place] Concussion Treadmill Test NV   Not assessed due to elevated BP     3  3mph for 2 minutes, up to 3% at 3 minutes (), 5% at 5 minutes 6% at 7 minutes  HR up to 128  Total of 18 minutes completed   HR max 130 bpm           Lifting mechanics reviewed with box lifts and octagonal bolster lift      FGA NV   18/30          Gait Training Modalities  Concussion handout reviewed, return to activity and symptom severity increase 2/10, reviewed previous HEP, concussion specific info Concussion education, POC Symptom severity                                            Access Code: [email or web address]  URL: [email or web address]  Date: 03/24/2022  Prepared by: Mariposa Thomas    Exercises  · Standing Bilateral Low Shoulder Row with Anchored Resistance - 1 x daily - 5 x weekly - 3 sets - 10 reps  · Shoulder Extension with Resistance - 1 x daily - 5 x weekly - 3 sets - 10 reps  · Seated Passive Cervical Retraction - 1 x daily - 5 x weekly - 10 reps  · Seated Gentle Upper Trapezius Stretch - 1 x daily - 7 x weekly - 3 sets - 30 hold  · Gentle Levator Scapulae Stretch - 1 x daily - 7 x weekly - 3 sets - 30 hold  · Standing Median Nerve Glide - 1 x daily - 7 x weekly - 3 sets - 10 reps  · Seated Assisted Cervical Rotation with Towel - 1 x daily - 7 x weekly - 2 sets - 10 reps - 5 hold  · Cervical Extension AROM with Strap - 1 x daily - 7 x weekly - 2 sets - 10 reps  · Dynamic Hug with Resistance - 1 x daily - 7 x weekly - 2 sets - 10 reps  · Standing Shoulder Single Arm PNF D2 Flexion with Resistance - 1 x daily - 5 x weekly - 2 sets - 10 reps

## 2022-04-22 ENCOUNTER — APPOINTMENT (OUTPATIENT)
Dept: PHYSICAL THERAPY | Facility: CLINIC | Age: 52
End: 2022-04-22
Payer: COMMERCIAL

## 2022-04-25 ENCOUNTER — OFFICE VISIT (OUTPATIENT)
Dept: FAMILY MEDICINE CLINIC | Facility: CLINIC | Age: 52
End: 2022-04-25
Payer: COMMERCIAL

## 2022-04-25 VITALS
BODY MASS INDEX: 33.9 KG/M2 | TEMPERATURE: 96.7 F | HEIGHT: 71 IN | DIASTOLIC BLOOD PRESSURE: 80 MMHG | WEIGHT: 242.13 LBS | SYSTOLIC BLOOD PRESSURE: 136 MMHG

## 2022-04-25 DIAGNOSIS — R00.2 PALPITATIONS: ICD-10-CM

## 2022-04-25 DIAGNOSIS — I10 PRIMARY HYPERTENSION: Primary | ICD-10-CM

## 2022-04-25 DIAGNOSIS — Z12.11 SCREEN FOR COLON CANCER: ICD-10-CM

## 2022-04-25 DIAGNOSIS — K63.5 POLYP OF COLON, UNSPECIFIED PART OF COLON, UNSPECIFIED TYPE: ICD-10-CM

## 2022-04-25 PROBLEM — N45.1 EPIDIDYMITIS: Status: RESOLVED | Noted: 2018-01-04 | Resolved: 2022-04-25

## 2022-04-25 PROBLEM — N41.0 ACUTE PROSTATITIS: Status: RESOLVED | Noted: 2020-07-08 | Resolved: 2022-04-25

## 2022-04-25 PROCEDURE — 3075F SYST BP GE 130 - 139MM HG: CPT | Performed by: FAMILY MEDICINE

## 2022-04-25 PROCEDURE — 3079F DIAST BP 80-89 MM HG: CPT | Performed by: FAMILY MEDICINE

## 2022-04-25 PROCEDURE — 3008F BODY MASS INDEX DOCD: CPT | Performed by: FAMILY MEDICINE

## 2022-04-25 PROCEDURE — 1036F TOBACCO NON-USER: CPT | Performed by: FAMILY MEDICINE

## 2022-04-25 PROCEDURE — 99214 OFFICE O/P EST MOD 30 MIN: CPT | Performed by: FAMILY MEDICINE

## 2022-04-25 RX ORDER — NEOMYCIN SULFATE, POLYMYXIN B SULFATE, AND DEXAMETHASONE 3.5; 10000; 1 MG/G; [USP'U]/G; MG/G
OINTMENT OPHTHALMIC
COMMUNITY
Start: 2022-04-02

## 2022-04-25 RX ORDER — LOSARTAN POTASSIUM 50 MG/1
TABLET ORAL
COMMUNITY
Start: 2022-03-29 | End: 2022-04-25 | Stop reason: ALTCHOICE

## 2022-04-25 NOTE — PROGRESS NOTES
Assessment and Plan:    Problem List Items Addressed This Visit     Colon polyp     Patient is due for colonoscopy  His blood pressure here was reasonable at 136/80 on repeat  The initial blood pressure was 130/80  Blood pressure cardiology was 138/80, and they felt that he was doing well as far as blood pressure was concerned, and they did not wish to add medications  Given that his blood pressure today is even better, I would also not adjust medications  Continue losartan 100 mg  Should be perfectly reasonable for the patient to proceed with upcoming procedures, including MRI, colonoscopy  Hypertension - Primary     BP today was good with recheck  He does follow with Cardio and they felt he was OK as well  He is on Losartan, and doing well  No changes  OK for MRI and colonoscopy  Palpitations     Minimal problems  He is getting CTA from Cardio, especially as he has some CP at times  Cardio felt this was related to muscle  Other Visit Diagnoses     Screen for colon cancer        Relevant Orders    Ambulatory referral for colonoscopy                 Diagnoses and all orders for this visit:    Primary hypertension    Palpitations    Polyp of colon, unspecified part of colon, unspecified type    Screen for colon cancer  -     Ambulatory referral for colonoscopy; Future    Other orders  -     Discontinue: losartan (COZAAR) 50 mg tablet  -     neomycin-polymyxin-dexamethasone (MAXITROL) 0 35%-10,000 units/g-0 1%; APPLY TO BOTH EYES AT BEDTIME            Subjective:      Patient ID: Ladarius Avery Sr  is a 45 y o  male  CC:    Chief Complaint   Patient presents with    Follow-up     for chronic conditions  mgb       HPI:    Here to follow up on BP  He has been OK  PT still getting elevated numbers  Cardiology is following up with patient as well  He has a gated CTA scheduled for the near future  They did give him metoprolol the take prior to the CT      Patient is due for colonoscopy again  He did have polyps previously  It was recommended to follow-up in 3-5 years, which it currently is  The following portions of the patient's history were reviewed and updated as appropriate: allergies, current medications, past family history, past medical history, past social history, past surgical history and problem list       Review of Systems   Constitutional: Negative  HENT: Negative  Eyes: Negative  Respiratory: Negative  Cardiovascular: Negative  Gastrointestinal: Negative  Endocrine: Negative  Genitourinary: Negative  Musculoskeletal: Negative  Skin: Negative  Allergic/Immunologic: Negative  Neurological: Negative  Hematological: Negative  Psychiatric/Behavioral: Negative  Data to review:   Blood sugar 78  Creatinine 1 01, GFR 89  Sodium 142, potassium 4 1, calcium 9 3  Objective:    Vitals:    04/25/22 0933 04/25/22 0955   BP: 130/76 136/80   BP Location: Right arm    Patient Position: Sitting    Cuff Size: Large    Temp: (!) 96 7 °F (35 9 °C)    TempSrc: Temporal    Weight: 110 kg (242 lb 2 oz)    Height: 5' 11" (1 803 m)         Physical Exam  Vitals and nursing note reviewed  Constitutional:       Appearance: Normal appearance  Neck:      Vascular: No carotid bruit  Cardiovascular:      Rate and Rhythm: Normal rate and regular rhythm  Pulses: Normal pulses  Carotid pulses are 2+ on the right side and 2+ on the left side  Heart sounds: Normal heart sounds  No murmur heard  No gallop  Pulmonary:      Effort: Pulmonary effort is normal  No respiratory distress  Breath sounds: Normal breath sounds  No stridor  No wheezing, rhonchi or rales  Chest:      Chest wall: No tenderness  Neurological:      Mental Status: He is alert

## 2022-04-25 NOTE — ASSESSMENT & PLAN NOTE
Patient is due for colonoscopy  His blood pressure here was reasonable at 136/80 on repeat  The initial blood pressure was 130/80  Blood pressure cardiology was 138/80, and they felt that he was doing well as far as blood pressure was concerned, and they did not wish to add medications  Given that his blood pressure today is even better, I would also not adjust medications  Continue losartan 100 mg  Should be perfectly reasonable for the patient to proceed with upcoming procedures, including MRI, colonoscopy

## 2022-04-25 NOTE — ASSESSMENT & PLAN NOTE
BP today was good with recheck  He does follow with Cardio and they felt he was OK as well  He is on Losartan, and doing well  No changes  OK for MRI and colonoscopy

## 2022-04-25 NOTE — ASSESSMENT & PLAN NOTE
Minimal problems  He is getting CTA from Cardio, especially as he has some CP at times  Cardio felt this was related to muscle

## 2022-04-25 NOTE — LETTER
April 25, 2022     [place] Jos, [address] [facility]  [address]  [id number]    Patient: Won Root Sr  YOB: 1970   Date of Visit: 4/25/2022       Dear Dr Jolanta Winkler:    Thank you for referring Felipe Luciano to me for evaluation  Below are my notes for this consultation  If you have questions, please do not hesitate to call me  I look forward to following your patient along with you  Sincerely,        Ariadna Robison MD        CC: No Recipients  Ariadna Robison MD  4/25/2022 10:02 AM  Incomplete  Assessment and Plan:    Problem List Items Addressed This Visit     Colon polyp     Patient is due for colonoscopy  His blood pressure here was reasonable at 136/80 on repeat  The initial blood pressure was 130/80  Blood pressure cardiology was 138/80, and they felt that he was doing well as far as blood pressure was concerned, and they did not wish to add medications  Given that his blood pressure today is even better, I would also not adjust medications  Continue losartan 100 mg  Should be perfectly reasonable for the patient to proceed with upcoming procedures, including MRI, colonoscopy  Hypertension - Primary     BP today was good with recheck  He does follow with Cardio and they felt he was OK as well  He is on Losartan, and doing well  No changes  OK for MRI and colonoscopy  Palpitations     Minimal problems  He is getting CTA from Cardio, especially as he has some CP at times  Cardio felt this was related to muscle  Other Visit Diagnoses     Screen for colon cancer        Relevant Orders    Ambulatory referral for colonoscopy                 Diagnoses and all orders for this visit:    Primary hypertension    Palpitations    Polyp of colon, unspecified part of colon, unspecified type    Screen for colon cancer  -     Ambulatory referral for colonoscopy;  Future    Other orders  -     Discontinue: losartan (COZAAR) 50 mg tablet  - neomycin-polymyxin-dexamethasone (MAXITROL) 0 35%-10,000 units/g-0 1%; APPLY TO BOTH EYES AT BEDTIME              Subjective:      Patient ID: Alexis Velázquez Sr  is a 45 y o  male  CC:    Chief Complaint   Patient presents with    Follow-up     for chronic conditions  mgb       HPI:    Here to follow up on BP  He has been OK  PT still getting elevated numbers  Cardiology is following up with patient as well  He has a gated CTA scheduled for the near future  They did give him metoprolol the take prior to the CT  Patient is due for colonoscopy again  He did have polyps previously  It was recommended to follow-up in 3-5 years, which it currently is  The following portions of the patient's history were reviewed and updated as appropriate: allergies, current medications, past family history, past medical history, past social history, past surgical history and problem list       Review of Systems   Constitutional: Negative  HENT: Negative  Eyes: Negative  Respiratory: Negative  Cardiovascular: Negative  Gastrointestinal: Negative  Endocrine: Negative  Genitourinary: Negative  Musculoskeletal: Negative  Skin: Negative  Allergic/Immunologic: Negative  Neurological: Negative  Hematological: Negative  Psychiatric/Behavioral: Negative  Data to review:   Blood sugar 78  Creatinine 1 01, GFR 89  Sodium 142, potassium 4 1, calcium 9 3  Objective:    Vitals:    04/25/22 0933 04/25/22 0955   BP: 130/76 136/80   BP Location: Right arm    Patient Position: Sitting    Cuff Size: Large    Temp: (!) 96 7 °F (35 9 °C)    TempSrc: Temporal    Weight: 110 kg (242 lb 2 oz)    Height: 5' 11" (1 803 m)         Physical Exam  Vitals and nursing note reviewed  Constitutional:       Appearance: Normal appearance  Neck:      Vascular: No carotid bruit  Cardiovascular:      Rate and Rhythm: Normal rate and regular rhythm  Pulses: Normal pulses  Carotid pulses are 2+ on the right side and 2+ on the left side  Heart sounds: Normal heart sounds  No murmur heard  No gallop  Pulmonary:      Effort: Pulmonary effort is normal  No respiratory distress  Breath sounds: Normal breath sounds  No stridor  No wheezing, rhonchi or rales  Chest:      Chest wall: No tenderness  Neurological:      Mental Status: He is alert

## 2022-04-25 NOTE — PATIENT INSTRUCTIONS
Problem List Items Addressed This Visit     Colon polyp     Patient is due for colonoscopy  His blood pressure here was reasonable at 136/80 on repeat  The initial blood pressure was 130/80  Blood pressure cardiology was 138/80, and they felt that he was doing well as far as blood pressure was concerned, and they did not wish to add medications  Given that his blood pressure today is even better, I would also not adjust medications  Continue losartan 100 mg  Should be perfectly reasonable for the patient to proceed with upcoming procedures, including MRI, colonoscopy  Hypertension - Primary     BP today was good with recheck  He does follow with Cardio and they felt he was OK as well  He is on Losartan, and doing well  No changes  OK for MRI and colonoscopy  Palpitations     Minimal problems  He is getting CTA from Cardio, especially as he has some CP at times  Cardio felt this was related to muscle  Other Visit Diagnoses     Screen for colon cancer        Relevant Orders    Ambulatory referral for colonoscopy          COVID 19 Instructions    Santos Santos Sr  was advised to limit contact with others to essential tasks such as getting food, medications, and medical care  Proper handwashing reviewed, and Hand sanitzer when washing is not available  If the patient develops symptoms of COVID 19, the patient should call the office as soon as possible  For 0811-0202 Flu season, it is strongly recommended that Flu Vaccinations be obtained  Virtual Visits:  Mac: This works on smart phones (any phone with Internet browsing capability)  You should get a text message when the provider is ready to see you  Click on the link in the text message, and the call should start  You will need to type in your name, and allow camera and microphone access  This is HIPPA compliant, and secure  If you have not already done so, get immunized to COVID 19        We are committed to getting you vaccinated as soon as possible and will be closely following CDC and [facility]  guidelines as they are released and revised  Please refer to our COVID-19 vaccine webpage for the most up to date information on the vaccine and our distribution efforts  This site will also have the most up to date recommendations for COVID booster vaccine  Daniel rodríguez    Call [phone] ([phone]), option 7    OUR NEW LOCATION:    [address], [address] [place], [place] [address] [place], [place], [address]  Fax: [phone]    Lab services and OB/GYN are at this location as well

## 2022-04-26 ENCOUNTER — OFFICE VISIT (OUTPATIENT)
Dept: PHYSICAL THERAPY | Facility: CLINIC | Age: 52
End: 2022-04-26
Payer: COMMERCIAL

## 2022-04-26 DIAGNOSIS — S06.0X0A CONCUSSION WITHOUT LOSS OF CONSCIOUSNESS, INITIAL ENCOUNTER: Primary | ICD-10-CM

## 2022-04-26 PROCEDURE — 97140 MANUAL THERAPY 1/> REGIONS: CPT

## 2022-04-26 PROCEDURE — 97112 NEUROMUSCULAR REEDUCATION: CPT

## 2022-04-26 NOTE — PROGRESS NOTES
Daily Note     Today's date: 2022  Patient name: Kirt Arshad  : 1970  MRN: [id number]  Referring provider: Lois Carrera MD  Dx:   Encounter Diagnosis     ICD-10-CM    1  Concussion without loss of consciousness, initial encounter  S06 [id number]                   Subjective: Patient reports hr has been feeling ok  States that he feels his eyes bother him, and difficulty with doing the self-ball toss laterally  He states he continues to have difficulty with looking at the computer screen, gets eye pain and nausea  Objective: See treatment diary below  140/80 at start of session    Assessment: Tolerated treatment well  Patient demonstrated fatigue post treatment, exhibited good technique with therapeutic exercises and would benefit from continued PT  Tolerated IASTM well, decreased pain post  Patient continues to have some dizziness and nausea with oculomotor exercises, mainly when moving to the L side  Was able to use cell phone while on treadmill with slight discomfort at eyes  Continue to progress as tolerated  Plan: Continue per plan of care  Precautions: cervical radiculopathy      Manuals 3/10 3/17 3/24 3/30 4/6 4/8 4/20 4/26    Joint mobs C5-C6 L sidelying  Prone thoracic spine UPA on L T2-T6  MFR to upper trap and lev stap on R side in sitting Seated MFR to upper traps, jt mobs to T3-T4 Seated MFR to upper traps, jt mobs to T4-T6  MET for posterior rib on R  Seated MFR to upper trap, jt mobs to T4-T6 grade II, MFR to L rhomboids   MFR to bilateral suboccipitals and cervical paraspinals    scap mobs in prone on L    IASTM in sitting to bilateral upper trap and lev scap IASTM in sitting to bilateral upper trap and lev scap    MFR supine to bilateral upper trap  IASTM in sitting to bilateral upper trap and lev scap    MFR sitting to L paraspinals                                    Neuro Re-Ed           convergence Reviewed for HEP           saccades Reviewed for HEP          Smooth pursuit           vor x 1 Horizontal, standing, plain 30" x 3 horizontal/vertical, plain 30" x 3         Median nerve glides on R UE Manual and self sitting x 10 to review for HEP  Self with sitting x 10        backwards           scap stabilization    Prone I's, W's 2 x 10  Shoulder depression 2 x 10, T's x 10       VOR      VOR x 1 in hallway x 3 laps VOR x 1 plus accomodation 30" x 3 each horizontal/vertical     VOR x 1 down hallway 50' x 2          Walking self toss side to side hallway 50' x 2, up/down 50' x 2  Pass behind x 10 each Walking self toss and bounce soccer ball side to side in hallway 50' x 4    Walking pass from R/L 50' x 4    Walking with ball toss and then turn 360 degree turn 50' x 2   hurdles           Ther Ex           Cervical retraction supine Seated x 10 Seated x 10         Upper trap stretch 30" x 3          Open book 5" x 10 each          Lev scap stretch 30" x 3          rows  Green x 30         Low rows  Green x 30    Bent-over row 5# x 20 each     Upper thoracic extension  5" x 15         pec doorway stretch  30" x 2            Cervical snags 5" x 10 each, extension x 15 reps   "farmer's carry" with 5# on each side, emphasis on scap depression x 3 laps hallway     Thoracic extension   Thoracic extension standing over foam roller x 30 reps        Serratus anterior   "hugs" green TB x 20,   Red weighted ball CW/CCW x 20 each direction on each        D2 ext TB  Green x 15 Green x 15 each        Ther Activity           [place] Concussion Treadmill Test Not assessed due to elevated BP     3  3mph for 2 minutes, up to 3% at 3 minutes (), 5% at 5 minutes 6% at 7 minutes  HR up to 128  Total of 18 minutes completed  HR max 130 bpm  TM 3 0 while reading on cell phone to tolerance for 8 minutes   Up to 5% incline at 9 minutes, total of 12 minutes completed       Lifting mechanics reviewed with box lifts and octagonal bolster lift       FGA NV           Gait Training Modalities Symptom severity                  edu on gym routine to return to PLOF, starting with light weights and machines, neurtral spine posture                       Access Code: [email or web address]  URL: [email or web address]  Date: 03/24/2022  Prepared by: Maryann Prime    Exercises  · Standing Bilateral Low Shoulder Row with Anchored Resistance - 1 x daily - 5 x weekly - 3 sets - 10 reps  · Shoulder Extension with Resistance - 1 x daily - 5 x weekly - 3 sets - 10 reps  · Seated Passive Cervical Retraction - 1 x daily - 5 x weekly - 10 reps  · Seated Gentle Upper Trapezius Stretch - 1 x daily - 7 x weekly - 3 sets - 30 hold  · Gentle Levator Scapulae Stretch - 1 x daily - 7 x weekly - 3 sets - 30 hold  · Standing Median Nerve Glide - 1 x daily - 7 x weekly - 3 sets - 10 reps  · Seated Assisted Cervical Rotation with Towel - 1 x daily - 7 x weekly - 2 sets - 10 reps - 5 hold  · Cervical Extension AROM with Strap - 1 x daily - 7 x weekly - 2 sets - 10 reps  · Dynamic Hug with Resistance - 1 x daily - 7 x weekly - 2 sets - 10 reps  · Standing Shoulder Single Arm PNF D2 Flexion with Resistance - 1 x daily - 5 x weekly - 2 sets - 10 reps

## 2022-04-28 ENCOUNTER — EVALUATION (OUTPATIENT)
Dept: PHYSICAL THERAPY | Facility: CLINIC | Age: 52
End: 2022-04-28
Payer: COMMERCIAL

## 2022-04-28 DIAGNOSIS — S06.0X0A CONCUSSION WITHOUT LOSS OF CONSCIOUSNESS, INITIAL ENCOUNTER: Primary | ICD-10-CM

## 2022-04-28 PROCEDURE — 97140 MANUAL THERAPY 1/> REGIONS: CPT

## 2022-04-28 PROCEDURE — 97164 PT RE-EVAL EST PLAN CARE: CPT

## 2022-04-28 PROCEDURE — 97112 NEUROMUSCULAR REEDUCATION: CPT
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
## 2022-04-29 ENCOUNTER — TELEPHONE (OUTPATIENT)
Dept: FAMILY MEDICINE CLINIC | Facility: CLINIC | Age: 52
End: 2022-04-29

## 2022-04-29 DIAGNOSIS — S06.0X0D CONCUSSION WITHOUT LOSS OF CONSCIOUSNESS, SUBSEQUENT ENCOUNTER: Primary | ICD-10-CM

## 2022-04-29 NOTE — ASSESSMENT & PLAN NOTE
Received a message from physical therapy  Patient is still having some symptoms that they feel are from the concussion, and may benefit from occupational therapy  Mostly is with computer screens and reading  Ordered OT

## 2022-04-29 NOTE — TELEPHONE ENCOUNTER
Problem List Items Addressed This Visit     Concussion - Primary     Received a message from physical therapy  Patient is still having some symptoms that they feel are from the concussion, and may benefit from occupational therapy  Mostly is with computer screens and reading  Ordered OT           Relevant Orders    Ambulatory Referral to Occupational Therapy

## 2022-05-05 ENCOUNTER — OFFICE VISIT (OUTPATIENT)
Dept: PHYSICAL THERAPY | Facility: CLINIC | Age: 52
End: 2022-05-05
Payer: COMMERCIAL

## 2022-05-05 DIAGNOSIS — S06.0X0A CONCUSSION WITHOUT LOSS OF CONSCIOUSNESS, INITIAL ENCOUNTER: Primary | ICD-10-CM

## 2022-05-05 PROCEDURE — 97140 MANUAL THERAPY 1/> REGIONS: CPT

## 2022-05-05 PROCEDURE — 97112 NEUROMUSCULAR REEDUCATION: CPT

## 2022-05-05 NOTE — PROGRESS NOTES
Daily Note     Today's date: 2022  Patient name: Marj Garcias  : 1970  MRN: [id number]  Referring provider: Sulaiman Mercedes MD  Dx:   Encounter Diagnosis     ICD-10-CM    1  Concussion without loss of consciousness, initial encounter  S06 0X0A        Start Time:   Stop Time:   Total time in clinic (min): 45 minutes    Subjective: Patient reports his neck has been feeling pretty good, he states he knows his limits with moving his neck  Patient arrived 15 minutes late to treatment session  Objective: See treatment diary below      Assessment: Tolerated treatment well  Patient demonstrated fatigue post treatment, exhibited good technique with therapeutic exercises and would benefit from continued PT  Minimal dizziness with exercises  Some tingling in R UE post diagonal head turns, decreased with MFR to R upper trap and inferior scap glide  Continue to progress as tolerated  Plan: Continue per plan of care  Precautions: cervical radiculopathy, HTN      Manuals 3/24 3/30 4/6 4/8 4/20 4/26 4/28 5    Seated MFR to upper traps, jt mobs to T4-T6  MET for posterior rib on R  Seated MFR to upper trap, jt mobs to T4-T6 grade II, MFR to L rhomboids  MFR to bilateral suboccipitals and cervical paraspinals    scap mobs in prone on L    IASTM in sitting to bilateral upper trap and lev scap IASTM in sitting to bilateral upper trap and lev scap    MFR supine to bilateral upper trap  IASTM in sitting to bilateral upper trap and lev scap    MFR sitting to L paraspinals  IASTM in sitting to bilateral upper trap and rhomboids                                    Neuro Re-Ed           convergence       Paper version of Gerardo string x 2 minutes     saccades           Smooth pursuit           vor x 1           Median nerve glides on R UE Self with sitting x 10          backwards           scap stabilization  Prone I's, W's 2 x 10   Shoulder depression 2 x 10, T's x 10         VOR    VOR x 1 in hallway x 3 laps VOR x 1 plus accomodation 30" x 3 each horizontal/vertical     VOR x 1 down hallway 50' x 2 VOR x 1  50' x 2 down hallway horiztonal/vertical VOR x 1 50' x 2 down hallway, horizontal/vertical    Diagonal head turns x 50' (discontinued due to neck pain)        Walking self toss side to side hallway 50' x 2, up/down 50' x 2  Pass behind x 10 each Walking self toss and bounce soccer ball side to side in hallway 50' x 4    Walking pass from R/L 50' x 4    Walking with ball toss and then turn 360 degree turn 50' x 2  Walking self toss up 50'x 4    Walking pass from R/L 50' x 4    360 degree turns 50' x 2    Basketball dribble cross overs 50' x 4   hurdles           Ther Ex           Cervical retraction supine           Upper trap stretch           Open book           Lev scap stretch           rows           Low rows    Bent-over row 5# x 20 each       Upper thoracic extension           pec doorway stretch            Cervical snags 5" x 10 each, extension x 15 reps   "farmer's carry" with 5# on each side, emphasis on scap depression x 3 laps hallway       Thoracic extension Thoracic extension standing over foam roller x 30 reps          Serratus anterior "hugs" green TB x 20,   Red weighted ball CW/CCW x 20 each direction on each          D2 ext TB Green x 15 each          Ther Activity           [facility] Concussion Treadmill Test    3  3mph for 2 minutes, up to 3% at 3 minutes (), 5% at 5 minutes 6% at 7 minutes  HR up to 128  Total of 18 minutes completed  HR max 130 bpm  TM 3 0 while reading on cell phone to tolerance for 8 minutes   Up to 5% incline at 9 minutes, total of 12 minutes completed  TM not available     Lifting mechanics reviewed with box lifts and octagonal bolster lift         FGA NV           Gait Training                                 Modalities                 edu on gym routine to return to PLOF, starting with light weights and machines, neurtral spine posture POC, eye movements and exercises, VOR                        Access Code: [email or web address]  URL: [email or web address]  Date: 03/24/2022  Prepared by: Phyllis Rush    Exercises  · Standing Bilateral Low Shoulder Row with Anchored Resistance - 1 x daily - 5 x weekly - 3 sets - 10 reps  · Shoulder Extension with Resistance - 1 x daily - 5 x weekly - 3 sets - 10 reps  · Seated Passive Cervical Retraction - 1 x daily - 5 x weekly - 10 reps  · Seated Gentle Upper Trapezius Stretch - 1 x daily - 7 x weekly - 3 sets - 30 hold  · Gentle Levator Scapulae Stretch - 1 x daily - 7 x weekly - 3 sets - 30 hold  · Standing Median Nerve Glide - 1 x daily - 7 x weekly - 3 sets - 10 reps  · Seated Assisted Cervical Rotation with Towel - 1 x daily - 7 x weekly - 2 sets - 10 reps - 5 hold  · Cervical Extension AROM with Strap - 1 x daily - 7 x weekly - 2 sets - 10 reps  · Dynamic Hug with Resistance - 1 x daily - 7 x weekly - 2 sets - 10 reps  · Standing Shoulder Single Arm PNF D2 Flexion with Resistance - 1 x daily - 5 x weekly - 2 sets - 10 reps    Access Code: [email or web address]  URL: [email or web address]  Date: 04/14/2022  Prepared by: Phyllis Rush    Exercises  · Standing Gaze Stabilization with Head Rotation - 1 x daily - 7 x weekly - 3 sets - 30 hold  · Standing Gaze Stabilization with Head Nod - 1 x daily - 7 x weekly - 3 sets - 30 hold  · Seated Horizontal Saccades - 1 x daily - 7 x weekly - 3 sets - 30 hold  · Seated Vertical Saccades - 1 x daily - 7 x weekly - 3 sets - 30 hold  · Seated Diagonal Saccades - 1 x daily - 7 x weekly - 3 sets - 30 hold

## 2022-05-09 ENCOUNTER — CONSULT (OUTPATIENT)
Dept: GASTROENTEROLOGY | Facility: MEDICAL CENTER | Age: 52
End: 2022-05-09
Payer: COMMERCIAL

## 2022-05-09 VITALS
TEMPERATURE: 98.7 F | DIASTOLIC BLOOD PRESSURE: 78 MMHG | WEIGHT: 241.4 LBS | BODY MASS INDEX: 33.67 KG/M2 | HEART RATE: 67 BPM | SYSTOLIC BLOOD PRESSURE: 138 MMHG

## 2022-05-09 DIAGNOSIS — Z12.11 SCREEN FOR COLON CANCER: ICD-10-CM

## 2022-05-09 PROCEDURE — 99203 OFFICE O/P NEW LOW 30 MIN: CPT | Performed by: PHYSICIAN ASSISTANT

## 2022-05-09 PROCEDURE — 1036F TOBACCO NON-USER: CPT | Performed by: PHYSICIAN ASSISTANT

## 2022-05-09 PROCEDURE — 3078F DIAST BP <80 MM HG: CPT | Performed by: PHYSICIAN ASSISTANT

## 2022-05-09 PROCEDURE — 3075F SYST BP GE 130 - 139MM HG: CPT | Performed by: PHYSICIAN ASSISTANT

## 2022-05-09 NOTE — PROGRESS NOTES
[facility] - Outpatient Consultation  Domingo Sinclair Sr  45 y o  male MRN: [id number]  Encounter: [id number]          ASSESSMENT AND PLAN:      1  Screen for colon cancer; last colonoscopy at [address] [place] [address] [address] with 3 polyps removed, pathology unavailable  Asked to repeat in 3-5 years  He does have family  Hx of colon cancer in either a grandmother or his father but he is unsure  Denies change in bowel habits, alarm symptoms   - Ambulatory referral for colonoscopy  - Colonoscopy; Future  -miralax/dulcoalx    Risks of colonoscopy were discussed including not limited to bleeding, infection, perforation  He understands and agrees to proceed with procedure    He does admit to having a cardiac imaging test end of this month for chest pain  We will need clearance from his cardiologist for this procedure    ______________________________________________________________________    HPI:  John Patterson is a 19-year-old male with past medical history of hypertension, hyperlipidemia, prostatitis who is here as a new patient for colon cancer screening purposes  His last colonoscopy was in 2017  This was done at [facility]   He had 3 polyps removed, unfortunately pathology is unable to be seen  He was recommended repeat this in 3-5 years  He denies any change in bowel habits, melena, hematochezia or alarm symptoms  He does admit to family history of colon cancer  He has not 100% sure if this was in a grandmother or his father  He also denies any acid reflux, dysphagia, odynophagia  Surgical history including hernia, testicular surgery for varicocele, cystoscopy      REVIEW OF SYSTEMS:    CONSTITUTIONAL: Denies any fever, chills, rigors, and weight loss  HEENT: No earache or tinnitus  Denies hearing loss or visual disturbances  CARDIOVASCULAR: No chest pain or palpitations  RESPIRATORY: Denies any cough, hemoptysis, shortness of breath or dyspnea on exertion    GASTROINTESTINAL: As noted in the History of Present Illness  GENITOURINARY: No problems with urination  Denies any hematuria or dysuria  NEUROLOGIC: No dizziness or vertigo, denies headaches  MUSCULOSKELETAL: Denies any muscle or joint pain  SKIN: Denies skin rashes or itching  ENDOCRINE: Denies excessive thirst  Denies intolerance to heat or cold  PSYCHOSOCIAL: Denies depression or anxiety  Denies any recent memory loss  Historical Information   Past Medical History:   Diagnosis Date    Acute prostatitis 7/8/2020    Benign prostatic hyperplasia without lower urinary tract symptoms     Chronic prostatitis     Epididymitis 1/4/2018    Erectile dysfunction     Hypertension     Stress fracture of tibia 4/9/2019    Sees [facility]      Testicular hypogonadism      Past Surgical History:   Procedure Laterality Date    CYSTOSCOPY      Diagnostic Bladder    HERNIA REPAIR      SURGERY SCROTAL / TESTICULAR      Spermatic Cord for Varicocele- per [employer]     Social History   Social History     Substance and Sexual Activity   Alcohol Use No    Comment: Per [employer]: drinks socially     Social History     Substance and Sexual Activity   Drug Use No     Social History     Tobacco Use   Smoking Status Never Smoker   Smokeless Tobacco Never Used     Family History   Problem Relation Age of Onset    Breast cancer Mother     Arthritis Father     Colon cancer Father     Coronary artery disease Father     Nephrolithiasis Father     Lung cancer Father     Testicular cancer Father     Osteoporosis Father     Cerebral palsy Brother        Meds/Allergies       Current Outpatient Medications:     Glucosamine Sulfate (SYNOVACIN PO)    hydrOXYzine HCL (ATARAX) 25 mg tablet    Krill Oil 1000 MG CAPS    losartan (COZAAR) 100 MG tablet    Multiple Vitamins-Minerals (MULTIVITAMIN ADULT EXTRA C PO)    neomycin-polymyxin-dexamethasone (MAXITROL) 0 35%-10,000 units/g-0 1%    methocarbamol (ROBAXIN) 500 mg tablet    Allergies   Allergen Reactions    Penicillins            Objective     Blood pressure 138/78, pulse 67, temperature 98 7 °F (37 1 °C), weight 109 kg (241 lb 6 4 oz)  Body mass index is 33 67 kg/m²  PHYSICAL EXAM:      General Appearance:   Alert, cooperative, no distress   HEENT:   Normocephalic, atraumatic, anicteric      Neck:  Supple, symmetrical, trachea midline   Lungs:   Clear to auscultation bilaterally; no rales, rhonchi or wheezing; respirations unlabored    Heart[de-identified]   Regular rate and rhythm; no murmur, rub, or gallop  Abdomen:   Soft, non-tender, non-distended; normal bowel sounds; no masses, no organomegaly    Genitalia:   Deferred    Rectal:   Deferred    Extremities:  No cyanosis, clubbing or edema    Pulses:  2+ and symmetric    Skin:  No jaundice, rashes, or lesions    Lymph nodes:  No palpable cervical lymphadenopathy        Lab Results:   No visits with results within 1 Day(s) from this visit     Latest known visit with results is:   Appointment on 03/05/2022   Component Date Value    WBC 03/05/2022 3 85*    RBC 03/05/2022 4 84     Hemoglobin 03/05/2022 14 4     Hematocrit 03/05/2022 43 0     MCV 03/05/2022 89     MCH 03/05/2022 29 8     MCHC 03/05/2022 33 5     RDW 03/05/2022 11 9     MPV 03/05/2022 10 3     Platelets 47/74/0105 176     nRBC 03/05/2022 0     Neutrophils Relative 03/05/2022 49     Immat GRANS % 03/05/2022 0     Lymphocytes Relative 03/05/2022 38     Monocytes Relative 03/05/2022 9     Eosinophils Relative 03/05/2022 3     Basophils Relative 03/05/2022 1     Neutrophils Absolute 03/05/2022 1 88     Immature Grans Absolute 03/05/2022 0 01     Lymphocytes Absolute 03/05/2022 1 46     Monocytes Absolute 03/05/2022 0 35     Eosinophils Absolute 03/05/2022 0 11     Basophils Absolute 03/05/2022 0 04     PSA 03/05/2022 0 8     Sodium 03/05/2022 140     Potassium 03/05/2022 4 5     Chloride 03/05/2022 109*    CO2 03/05/2022 28     ANION GAP 03/05/2022 3*    BUN 03/05/2022 19     Creatinine 03/05/2022 1 18     Glucose, Fasting 03/05/2022 98     Calcium 03/05/2022 9 3     AST 03/05/2022 22     ALT 03/05/2022 46     Alkaline Phosphatase 03/05/2022 52     Total Protein 03/05/2022 6 8     Albumin 03/05/2022 3 7     Total Bilirubin 03/05/2022 0 43     eGFR 03/05/2022 70     Cholesterol 03/05/2022 234*    Triglycerides 03/05/2022 101     HDL, Direct 03/05/2022 57     LDL Calculated 03/05/2022 157*    Color, UA 03/05/2022 Light Yellow     Clarity, UA 03/05/2022 Clear     Specific Gravity, UA 03/05/2022 1 026     pH, UA 03/05/2022 6 0     Leukocytes, UA 03/05/2022 Negative     Nitrite, UA 03/05/2022 Negative     Protein, UA 03/05/2022 30 (1+)*    Glucose, UA 03/05/2022 Negative     Ketones, UA 03/05/2022 Negative     Urobilinogen, UA 03/05/2022 <2 0     Bilirubin, UA 03/05/2022 Negative     Blood, UA 03/05/2022 Negative     RBC, UA 03/05/2022 1-2     WBC, UA 03/05/2022 1-2     Epithelial Cells 03/05/2022 None Seen     Bacteria, UA 03/05/2022 Occasional     MUCUS THREADS 03/05/2022 Occasional*         Radiology Results:   No results found

## 2022-05-09 NOTE — PATIENT INSTRUCTIONS
Scheduled date of colonoscopy (as of today): 08/03/2022  Physician performing: Dr Flores Mode  Location of procedure:  [place]  Bowel prep reviewed with patient: Miralax/Dulcolax  Instructions reviewed with patient by: MA  Clearances: cardiac clearance requested [facility]

## 2022-05-10 ENCOUNTER — OFFICE VISIT (OUTPATIENT)
Dept: PHYSICAL THERAPY | Facility: CLINIC | Age: 52
End: 2022-05-10
Payer: COMMERCIAL

## 2022-05-10 DIAGNOSIS — S06.0X0A CONCUSSION WITHOUT LOSS OF CONSCIOUSNESS, INITIAL ENCOUNTER: Primary | ICD-10-CM

## 2022-05-10 PROCEDURE — 97140 MANUAL THERAPY 1/> REGIONS: CPT

## 2022-05-10 PROCEDURE — 97112 NEUROMUSCULAR REEDUCATION: CPT

## 2022-05-10 NOTE — PROGRESS NOTES
Daily Note     Today's date: 5/10/2022  Patient name: Petrona Smyth  : 1970  MRN: [id number]  Referring provider: Anand Toribio MD  Dx:   Encounter Diagnosis     ICD-10-CM    1  Concussion without loss of consciousness, initial encounter  S06 [id number]                   Subjective: Patient reports he hasn't been getting the "woosy" feeling, only very slight that I wouldn't consider much  He states "I know my limits" with his neck  Patient arrived 17 minutes late to treatment session  Objective: See treatment diary below      Assessment: Tolerated treatment well  Patient demonstrated fatigue post treatment, exhibited good technique with therapeutic exercises and would benefit from continued PT  Patient challenged with head turns while tandem walking  Continues to have some blurriness with head turns and eye strain with looking up  Was able to tolerate ambulation on TM with reading on phone with decreased difficulty  Some hypertonicity along R paraspinals and rhomboids, decreased post MFR and jt mobs  Continue to progress as tolerated  Plan: Continue per plan of care                  Precautions: cervical radiculopathy, HTN      Manuals 4/6 4/8 4/20 4/26 4/28 5/5 5/10     IASTM in sitting to bilateral upper trap and lev scap IASTM in sitting to bilateral upper trap and lev scap    MFR supine to bilateral upper trap  IASTM in sitting to bilateral upper trap and lev scap    MFR sitting to L paraspinals  IASTM in sitting to bilateral upper trap and rhomboids IASTM in sitting to bilateral upper trap and rhomboids    MFR R rhomboids,   Jt mobs grade III to R T4-6                                 Neuro Re-Ed          convergence     Paper version of Gerardo string x 2 minutes      saccades          Smooth pursuit          vor x 1          Median nerve glides on R UE          backwards          scap stabilization          VOR  VOR x 1 in hallway x 3 laps VOR x 1 plus accomodation 30" x 3 each horizontal/vertical     VOR x 1 down hallway 50' x 2 VOR x 1  50' x 2 down hallway horiztonal/vertical VOR x 1 50' x 2 down hallway, horizontal/vertical    Diagonal head turns x 50' (discontinued due to neck pain) VOR x 1 50' x 1 tandem with head turns    Tandem with head nods 50' x 1            Walking self toss side to side hallway 50' x 2, up/down 50' x 2  Pass behind x 10 each Walking self toss and bounce soccer ball side to side in hallway 50' x 4    Walking pass from R/L 50' x 4    Walking with ball toss and then turn 360 degree turn 50' x 2  Walking self toss up 50'x 4    Walking pass from R/L 50' x 4    360 degree turns 50' x 2    Basketball dribble cross overs 50' x 4 Walking self-toss up 50' x 4    Walking pass from R/L 50' x 4    360 degree turns 50' x 2 each direction   hurdles          Ther Ex          Cervical retraction supine          Upper trap stretch          Open book          Lev scap stretch          rows          Low rows  Bent-over row 5# x 20 each        Upper thoracic extension          pec doorway stretch            "farmer's carry" with 5# on each side, emphasis on scap depression x 3 laps hallway        Thoracic extension          Serratus anterior          D2 ext TB          Ther Activity          Lovilia Concussion Treadmill Test  3  3mph for 2 minutes, up to 3% at 3 minutes (), 5% at 5 minutes 6% at 7 minutes  HR up to 128  Total of 18 minutes completed  HR max 130 bpm  TM 3 0 while reading on cell phone to tolerance for 8 minutes   Up to 5% incline at 9 minutes, total of 12 minutes completed  TM not available TM 2 5-3 0 mph while reading on phone, for 10 minutes             FGA NV          Gait Training                              Modalities              edu on gym routine to return to PLOF, starting with light weights and machines, neurtral spine posture POC, eye movements and exercises, VOR                        Access Code: [email or web address]  URL: [email or web address]  Date: 03/24/2022  Prepared by: Robi Claudio    Exercises  · Standing Bilateral Low Shoulder Row with Anchored Resistance - 1 x daily - 5 x weekly - 3 sets - 10 reps  · Shoulder Extension with Resistance - 1 x daily - 5 x weekly - 3 sets - 10 reps  · Seated Passive Cervical Retraction - 1 x daily - 5 x weekly - 10 reps  · Seated Gentle Upper Trapezius Stretch - 1 x daily - 7 x weekly - 3 sets - 30 hold  · Gentle Levator Scapulae Stretch - 1 x daily - 7 x weekly - 3 sets - 30 hold  · Standing Median Nerve Glide - 1 x daily - 7 x weekly - 3 sets - 10 reps  · Seated Assisted Cervical Rotation with Towel - 1 x daily - 7 x weekly - 2 sets - 10 reps - 5 hold  · Cervical Extension AROM with Strap - 1 x daily - 7 x weekly - 2 sets - 10 reps  · Dynamic Hug with Resistance - 1 x daily - 7 x weekly - 2 sets - 10 reps  · Standing Shoulder Single Arm PNF D2 Flexion with Resistance - 1 x daily - 5 x weekly - 2 sets - 10 reps    Access Code: [email or web address]  URL: [email or web address]  Date: 04/14/2022  Prepared by: Robi Claudio    Exercises  · Standing Gaze Stabilization with Head Rotation - 1 x daily - 7 x weekly - 3 sets - 30 hold  · Standing Gaze Stabilization with Head Nod - 1 x daily - 7 x weekly - 3 sets - 30 hold  · Seated Horizontal Saccades - 1 x daily - 7 x weekly - 3 sets - 30 hold  · Seated Vertical Saccades - 1 x daily - 7 x weekly - 3 sets - 30 hold  · Seated Diagonal Saccades - 1 x daily - 7 x weekly - 3 sets - 30 hold

## 2022-05-24 ENCOUNTER — APPOINTMENT (OUTPATIENT)
Dept: PHYSICAL THERAPY | Facility: CLINIC | Age: 52
End: 2022-05-24
Payer: COMMERCIAL

## 2022-06-12 ENCOUNTER — OFFICE VISIT (OUTPATIENT)
Dept: URGENT CARE | Facility: MEDICAL CENTER | Age: 52
End: 2022-06-12
Payer: COMMERCIAL

## 2022-06-12 ENCOUNTER — TELEPHONE (OUTPATIENT)
Dept: URGENT CARE | Facility: MEDICAL CENTER | Age: 52
End: 2022-06-12

## 2022-06-12 VITALS
RESPIRATION RATE: 20 BRPM | HEIGHT: 71 IN | DIASTOLIC BLOOD PRESSURE: 90 MMHG | SYSTOLIC BLOOD PRESSURE: 147 MMHG | HEART RATE: 80 BPM | TEMPERATURE: 97.6 F | BODY MASS INDEX: 33.6 KG/M2 | OXYGEN SATURATION: 97 % | WEIGHT: 240 LBS

## 2022-06-12 DIAGNOSIS — H66.91 RIGHT OTITIS MEDIA, UNSPECIFIED OTITIS MEDIA TYPE: Primary | ICD-10-CM

## 2022-06-12 PROCEDURE — S9088 SERVICES PROVIDED IN URGENT: HCPCS | Performed by: PHYSICIAN ASSISTANT

## 2022-06-12 PROCEDURE — 99213 OFFICE O/P EST LOW 20 MIN: CPT | Performed by: PHYSICIAN ASSISTANT

## 2022-06-12 RX ORDER — AZITHROMYCIN 250 MG/1
TABLET, FILM COATED ORAL
Qty: 6 TABLET | Refills: 0 | Status: SHIPPED | OUTPATIENT
Start: 2022-06-12 | End: 2022-06-16

## 2022-06-12 RX ORDER — AZITHROMYCIN 250 MG/1
TABLET, FILM COATED ORAL
Qty: 6 TABLET | Refills: 0 | Status: SHIPPED | OUTPATIENT
Start: 2022-06-12 | End: 2022-06-12

## 2022-06-12 NOTE — PATIENT INSTRUCTIONS
Patient was educated on right ear infection  Patient was prescribed antibiotics  Patient was told to eat on antibiotics  Patient was told if symptoms persist follow up with PCP    Ear Infection   WHAT YOU NEED TO KNOW:   An ear infection is also called otitis media  Blocked or swollen eustachian tubes can cause an infection  Eustachian tubes connect the middle ear to the back of the nose and throat  They drain fluid from the middle ear  You may have a buildup of fluid in your ear  Germs build up in the fluid and infection develops  DISCHARGE INSTRUCTIONS:   Return to the emergency department if:   You have clear fluid coming from your ear  You have a stiff neck, headache, and a fever  Call your doctor if:   You see blood or pus draining from your ear  Your ear pain gets worse or does not go away, even after treatment  The outside of your ear is red or swollen  You are vomiting or have diarrhea  You have questions or concerns about your condition or care  Medicines: You may  need any of the following:  Acetaminophen  decreases pain and fever  It is available without a doctor's order  Ask how much to take and how often to take it  Follow directions  Read the labels of all other medicines you are using to see if they also contain acetaminophen, or ask your doctor or pharmacist  Acetaminophen can cause liver damage if not taken correctly  Do not use more than 4 grams (4,000 milligrams) total of acetaminophen in one day  NSAIDs , such as ibuprofen, help decrease swelling, pain, and fever  This medicine is available with or without a doctor's order  NSAIDs can cause stomach bleeding or kidney problems in certain people  If you take blood thinner medicine, always ask your healthcare provider if NSAIDs are safe for you  Always read the medicine label and follow directions  Ear drops  may contain medicine to decrease pain and inflammation      Antibiotics  help treat a bacterial infection  Take your medicine as directed  Contact your healthcare provider if you think your medicine is not helping or if you have side effects  Tell him or her if you are allergic to any medicine  Keep a list of the medicines, vitamins, and herbs you take  Include the amounts, and when and why you take them  Bring the list or the pill bottles to follow-up visits  Carry your medicine list with you in case of an emergency  Self-care:   Apply heat  on your ear for 15 to 20 minutes, 3 to 4 times a day or as directed  You can apply heat with an electric heating pad, hot water bottle, or warm compress  Always put a cloth between your skin and the heat pack to prevent burns  Heat helps decrease pain  Apply ice  on your ear for 15 to 20 minutes, 3 to 4 times a day for 2 days or as directed  Use an ice pack, or put crushed ice in a plastic bag  Cover it with a towel before you apply it to your ear  Ice decreases swelling and pain  Prevent an ear infection:   Wash your hands often  to help prevent the spread of germs  Ask everyone in your house to wash their hands with soap and water  Ask them to wash after they use the bathroom or change a diaper  Remind them to wash before they prepare or eat food  Stay away from people who are ill  Some germs spread easily and quickly through contact  Follow up with your doctor as directed:  Write down your questions so you remember to ask them during your visits  © Copyright [employer] 2022 Information is for End User's use only and may not be sold, redistributed or otherwise used for commercial purposes  All illustrations and images included in CareNotes® are the copyrighted property of A D A M , Inc  or Corey Clemons  The above information is an  only  It is not intended as medical advice for individual conditions or treatments   Talk to your doctor, nurse or pharmacist before following any medical regimen to see if it is safe and effective for you

## 2022-06-12 NOTE — PROGRESS NOTES
[facility]        NAME: Malaika Nicholas is a 45 y o  male  : 1970    MRN: [id number]  DATE: 2022  TIME: 5:57 PM    Assessment and Plan   Right otitis media, unspecified otitis media type [H66 91]  1  Right otitis media, unspecified otitis media type  azithromycin (ZITHROMAX) 250 mg tablet    DISCONTINUED: azithromycin (ZITHROMAX) 250 mg tablet         Patient Instructions     Patient was educated on right ear infection  Patient was prescribed antibiotics  Patient was told to eat on antibiotics  Patient was told if symptoms persist follow up with PCP    Chief Complaint     Chief Complaint   Patient presents with    Earache     Patient states he has fullness and decreased hearing in his R ear         History of Present Illness       Patient is here today complaining of right ear pain for two weeks  Patient reports decreased hearing in right ear  Admits allergy to Pencillins  Review of Systems   Review of Systems   Constitutional: Negative  HENT: Positive for ear pain  Respiratory: Negative  Neurological: Negative  Psychiatric/Behavioral: Negative            Current Medications       Current Outpatient Medications:     Glucosamine Sulfate (SYNOVACIN PO), Glucosamine, Disp: , Rfl:     Krill Oil 1000 MG CAPS, krill oil, Disp: , Rfl:     losartan (COZAAR) 100 MG tablet, Take 1 tablet (100 mg total) by mouth daily, Disp: 30 tablet, Rfl: 2    Multiple Vitamins-Minerals (MULTIVITAMIN ADULT EXTRA C PO), one daily, Disp: , Rfl:     neomycin-polymyxin-dexamethasone (MAXITROL) 0 35%-10,000 units/g-0 1%, APPLY TO BOTH EYES AT BEDTIME, Disp: , Rfl:     azithromycin (ZITHROMAX) 250 mg tablet, Take 2 tablets today then 1 tablet daily x 4 days, Disp: 6 tablet, Rfl: 0    hydrOXYzine HCL (ATARAX) 25 mg tablet, Take 50 mg by mouth every 6 (six) hours as needed (Patient not taking: Reported on 2022), Disp: , Rfl:     methocarbamol (ROBAXIN) 500 mg tablet, Take 1 tablet (500 mg total) by mouth 3 (three) times a day as needed for muscle spasms (Patient not taking: No sig reported), Disp: 30 tablet, Rfl: 0    Current Allergies     Allergies as of 06/12/2022 - Reviewed 06/12/2022   Allergen Reaction Noted    Penicillins  03/21/2017            The following portions of the patient's history were reviewed and updated as appropriate: allergies, current medications, past family history, past medical history, past social history, past surgical history and problem list      Past Medical History:   Diagnosis Date    Acute prostatitis 7/8/2020    Benign prostatic hyperplasia without lower urinary tract symptoms     Chronic prostatitis     Epididymitis 1/4/2018    Erectile dysfunction     Hypertension     Stress fracture of tibia 4/9/2019    Sees [facility]   Testicular hypogonadism        Past Surgical History:   Procedure Laterality Date    CYSTOSCOPY      Diagnostic Bladder    HERNIA REPAIR      SURGERY SCROTAL / TESTICULAR      Spermatic Cord for Varicocele- per [employer]       Family History   Problem Relation Age of Onset    Breast cancer Mother     Arthritis Father     Colon cancer Father     Coronary artery disease Father     Nephrolithiasis Father     Lung cancer Father     Testicular cancer Father     Osteoporosis Father     Cerebral palsy Brother          Medications have been verified  Objective   /90   Pulse 80   Temp 97 6 °F (36 4 °C)   Resp 20   Ht 5' 11" (1 803 m)   Wt 109 kg (240 lb)   SpO2 97%   BMI 33 47 kg/m²   No LMP for male patient  Physical Exam     Physical Exam  Vitals and nursing note reviewed  Constitutional:       Appearance: Normal appearance  HENT:      Head: Normocephalic        Right Ear: Ear canal and external ear normal       Left Ear: Tympanic membrane, ear canal and external ear normal       Ears:      Comments: RIGht TM is mildly red and bulging     Nose: Nose normal       Mouth/Throat:      Mouth: Mucous membranes are moist       Pharynx: No oropharyngeal exudate or posterior oropharyngeal erythema  Eyes:      Pupils: Pupils are equal, round, and reactive to light  Neck:      Comments: No palpable lymph nodes  Cardiovascular:      Rate and Rhythm: Normal rate and regular rhythm  Heart sounds: Normal heart sounds  Pulmonary:      Breath sounds: Normal breath sounds  No wheezing  Neurological:      General: No focal deficit present  Mental Status: He is alert and oriented to person, place, and time     Psychiatric:         Mood and Affect: Mood normal

## 2022-06-22 NOTE — PROGRESS NOTES
Patient had multiple no-shows/cancels, did not call to reschedule or return therapist's calls  More than 30 days have passed, d/c at this time

## 2022-07-08 ENCOUNTER — TELEPHONE (OUTPATIENT)
Dept: PHYSICAL THERAPY | Facility: CLINIC | Age: 52
End: 2022-07-08

## 2022-07-08 NOTE — TELEPHONE ENCOUNTER
Returned patient's call from previous day  He states that he missed last sessions of physical therapy due to wife, himself, daughter, and granddaughter being sick with covid  He reports he tried to cancel them but appointments were still there even though he called the  to tell them he would not be there  He states that he has been having neck pain and tingling in his hand more with getting back to work  Was calling to get back into physical therapy  Advised patient to get a new script for physical therapy due to it being >30 days since he was last seen, and recommended going to an orthopedic clinic (given phone number for [facility]) since symptoms seem to be more orthopedic in nature  He reported he still has pressure in his eyes, but saw a specialist who found that he had increased eye pressure and gave him eye drops and may need surgery to relieve the pressure behind eyes  No further questions at this time

## 2022-07-20 ENCOUNTER — TELEPHONE (OUTPATIENT)
Dept: GASTROENTEROLOGY | Facility: MEDICAL CENTER | Age: 52
End: 2022-07-20

## 2022-07-20 NOTE — TELEPHONE ENCOUNTER
Dr Amy Vieira,  I requested cardiac clearance for Santos from [facility] on 2022 and am unsuccessful obtaining a response from [facility]  Please advise how you would like to proceed  Thank you

## 2022-07-22 NOTE — TELEPHONE ENCOUNTER
Patient would need cardiac clearance for procedures  Please check with patient how to obtain the record or if you would be willing to switch care to [facility] cardiology for further management and clearance

## 2022-07-25 NOTE — TELEPHONE ENCOUNTER
Called Dr Edgar Chang office 3 times today a risk assessment was started 5/10/2022 but never completed and got transferred to RN line  RN line busy - left message and asked to e fax cardiac clearance and return my call at back line phone number given

## 2022-08-02 ENCOUNTER — TELEPHONE (OUTPATIENT)
Dept: GASTROENTEROLOGY | Facility: MEDICAL CENTER | Age: 52
End: 2022-08-02

## 2022-08-02 RX ORDER — SODIUM CHLORIDE 9 MG/ML
125 INJECTION, SOLUTION INTRAVENOUS CONTINUOUS
Status: CANCELLED | OUTPATIENT
Start: 2022-08-02

## 2022-08-02 NOTE — TELEPHONE ENCOUNTER
Left detailed message for patient that his colonoscopy scheduled for 8/3 is being canceled per anesthesia, Patient has seen cardiology on 7/29 for chest pains and must follow up with cardiology before procedure can be rescheduled

## 2022-09-30 ENCOUNTER — OFFICE VISIT (OUTPATIENT)
Dept: FAMILY MEDICINE CLINIC | Facility: CLINIC | Age: 52
End: 2022-09-30
Payer: COMMERCIAL

## 2022-09-30 VITALS
HEIGHT: 71 IN | SYSTOLIC BLOOD PRESSURE: 136 MMHG | OXYGEN SATURATION: 97 % | HEART RATE: 84 BPM | DIASTOLIC BLOOD PRESSURE: 80 MMHG | BODY MASS INDEX: 33.88 KG/M2 | WEIGHT: 242 LBS | RESPIRATION RATE: 20 BRPM | TEMPERATURE: 97 F

## 2022-09-30 DIAGNOSIS — Z12.5 SCREENING FOR MALIGNANT NEOPLASM OF PROSTATE: ICD-10-CM

## 2022-09-30 DIAGNOSIS — R00.2 PALPITATIONS: Primary | ICD-10-CM

## 2022-09-30 DIAGNOSIS — I10 PRIMARY HYPERTENSION: ICD-10-CM

## 2022-09-30 DIAGNOSIS — F41.0 PANIC ATTACKS: ICD-10-CM

## 2022-09-30 PROCEDURE — 99214 OFFICE O/P EST MOD 30 MIN: CPT | Performed by: PHYSICIAN ASSISTANT

## 2022-09-30 RX ORDER — PROPRANOLOL HYDROCHLORIDE 10 MG/1
10 TABLET ORAL 2 TIMES DAILY PRN
Qty: 60 TABLET | Refills: 0 | Status: SHIPPED | OUTPATIENT
Start: 2022-09-30 | End: 2022-10-24

## 2022-09-30 RX ORDER — LORAZEPAM 1 MG/1
1 TABLET ORAL 2 TIMES DAILY PRN
COMMUNITY
Start: 2022-09-30 | End: 2022-09-30 | Stop reason: SDUPTHER

## 2022-09-30 RX ORDER — HYDROCHLOROTHIAZIDE 25 MG/1
25 TABLET ORAL DAILY
Qty: 30 TABLET | Refills: 2 | Status: SHIPPED | OUTPATIENT
Start: 2022-09-30 | End: 2022-10-24

## 2022-09-30 RX ORDER — LATANOPROST 50 UG/ML
SOLUTION/ DROPS OPHTHALMIC
COMMUNITY
Start: 2022-09-08

## 2022-09-30 RX ORDER — LORAZEPAM 1 MG/1
1 TABLET ORAL DAILY PRN
Qty: 10 TABLET | Refills: 0 | Status: SHIPPED | OUTPATIENT
Start: 2022-09-30

## 2022-09-30 NOTE — PATIENT INSTRUCTIONS
Assessment/plan:   Benign essential hypertension -not at goal   Treatment options were discussed  Patient did previously have side effects with long-acting beta-blockers including erectile dysfunction  Will start on hydrochlorothiazide 25 mg daily in addition to losartan 100 mg daily he is already taking  Continue follow-up with cardiology  2  Palpitations-patient did have Holter monitor last year with about 1300 PVCs per 24 hour  He does get episodes where he feels the PVCs coming on more frequently  I will give him prescription propanolol 10 mg to be used as necessary for this  This can also be used if he is feeling anxiety or panic attack coming on  3   Anxiety /panic attacks- if patient is having breakthrough symptoms and propanolol is not helpful he will be given prescription for lorazepam 1 mg to be used as necessary  Will prescribe 10 pills with no refills and hopefully this will last several months  If he needs it more frequently consider daily anxiety treatments  4   Hyperlipidemia- statin therapy previously recommended the patient here however after seeing his cardiologist patient states that he felt he  did not need to go on anything  LDL is significantly uncontrolled, last in the 150s  5  Elevated creatinine- patient had single elevated reading of 1 47  Recommend reassessing labs in the next 3-6 months

## 2022-09-30 NOTE — PROGRESS NOTES
Name: Derek Dejesus      : 1970      MRN: [id number]  Encounter Provider: Leia Paul PA-C  Encounter Date: 2022   Encounter department: [address] [place] [address] PRIMARY CARE    Assessment & Plan     Patient Instructions    Assessment/plan:   Benign essential hypertension -not at goal   Treatment options were discussed  Patient did previously have side effects with long-acting beta-blockers including erectile dysfunction  Will start on hydrochlorothiazide 25 mg daily in addition to losartan 100 mg daily he is already taking  Continue follow-up with cardiology  2  Palpitations-patient did have Holter monitor last year with about 1300 PVCs per 24 hour  He does get episodes where he feels the PVCs coming on more frequently  I will give him prescription propanolol 10 mg to be used as necessary for this  This can also be used if he is feeling anxiety or panic attack coming on  3   Anxiety /panic attacks- if patient is having breakthrough symptoms and propanolol is not helpful he will be given prescription for lorazepam 1 mg to be used as necessary  Will prescribe 10 pills with no refills and hopefully this will last several months  If he needs it more frequently consider daily anxiety treatments  4   Hyperlipidemia- statin therapy previously recommended the patient here however after seeing his cardiologist patient states that he felt he  did not need to go on anything  LDL is significantly uncontrolled, last in the 150s  5  Elevated creatinine- patient had single elevated reading of 1 47  Recommend reassessing labs in the next 3-6 months  1  Palpitations  -     propranolol (INDERAL) 10 mg tablet; Take 1 tablet (10 mg total) by mouth 2 (two) times a day as needed (anxiety or palpitations)  -     CBC and differential; Future; Expected date: 2022  -     Comprehensive metabolic panel; Future; Expected date: 2022  -     Lipid Panel with Direct LDL reflex;  Future; Expected date: 12/30/2022  -     TSH, 3rd generation with Free T4 reflex; Future; Expected date: 12/30/2022    2  Primary hypertension  -     hydrochlorothiazide (HYDRODIURIL) 25 mg tablet; Take 1 tablet (25 mg total) by mouth daily  -     CBC and differential; Future; Expected date: 12/30/2022  -     Comprehensive metabolic panel; Future; Expected date: 12/30/2022  -     Lipid Panel with Direct LDL reflex; Future; Expected date: 12/30/2022  -     TSH, 3rd generation with Free T4 reflex; Future; Expected date: 12/30/2022    3  Panic attacks  -     LORazepam (ATIVAN) 1 mg tablet; Take 1 tablet (1 mg total) by mouth daily as needed (severe anxiety)  -     CBC and differential; Future; Expected date: 12/30/2022  -     Comprehensive metabolic panel; Future; Expected date: 12/30/2022  -     Lipid Panel with Direct LDL reflex; Future; Expected date: 12/30/2022  -     TSH, 3rd generation with Free T4 reflex; Future; Expected date: 12/30/2022    4  Screening for malignant neoplasm of prostate  -     PSA, Total Screen; Future; Expected date: 12/30/2022      Depression Screening and Follow-up Plan: Patient was screened for depression during today's encounter  They screened negative with a PHQ-2 score of 0  Subjective        HPI:  This is a 62-year-old gentleman that presents to the office for follow-up of emergency room visit yesterday  Patient states that he has had hypertension for quite some time and has been resistant to taking more treatment but his blood pressure has usually been elevated and he grew concerned the other night and started to feel palpitations  His started the spiral out of control in his blood pressure went up and his pulse rate went up  He was not sure how to get it down and went to the emergency room for evaluation  Patient does have a cardiologist that he sees regularly    He states that he was told to go on medication for his cholesterol by our office previously but after further testing by his cardiologist was told that it is not necessary  His losartan however has been increased to 100 mg and he has been tolerating this well  Previously he was on beta-blocker treatments but felt that he had some erectile symptoms with them and did not stay on them long-term  Review of Systems   Constitutional: Negative for chills, fatigue and fever  HENT: Negative for congestion, ear pain and sinus pressure  Eyes: Negative for visual disturbance  Respiratory: Negative for cough, chest tightness and shortness of breath  Cardiovascular: Negative for chest pain and palpitations  Gastrointestinal: Negative for diarrhea, nausea and vomiting  Endocrine: Negative for polyuria  Genitourinary: Negative for dysuria and frequency  Musculoskeletal: Negative for arthralgias and myalgias  Skin: Negative for pallor and rash  Neurological: Negative for dizziness, weakness, light-headedness, numbness and headaches  Psychiatric/Behavioral: Negative for agitation, behavioral problems and sleep disturbance  All other systems reviewed and are negative        Current Outpatient Medications on File Prior to Visit   Medication Sig    hydrOXYzine HCL (ATARAX) 25 mg tablet Take 50 mg by mouth every 6 (six) hours as needed    Krill Oil 1000 MG CAPS krill oil    latanoprost (XALATAN) 0 005 % ophthalmic solution INSTILL ONE DROP IN BOTH EYES AT BEDTIME    losartan (COZAAR) 100 MG tablet Take 1 tablet (100 mg total) by mouth daily    Multiple Vitamins-Minerals (MULTIVITAMIN ADULT EXTRA C PO) one daily    [DISCONTINUED] LORazepam (ATIVAN) 1 mg tablet Take 1 mg by mouth 2 (two) times a day as needed    Glucosamine Sulfate (SYNOVACIN PO) Glucosamine (Patient not taking: Reported on 9/30/2022)    methocarbamol (ROBAXIN) 500 mg tablet Take 1 tablet (500 mg total) by mouth 3 (three) times a day as needed for muscle spasms (Patient not taking: No sig reported)    neomycin-polymyxin-dexamethasone (MAXITROL) 0 35%-10,000 units/g-0 1% APPLY TO BOTH EYES AT BEDTIME (Patient not taking: Reported on 9/30/2022)       Objective     /80 (BP Location: Left arm, Patient Position: Sitting, Cuff Size: Standard)   Pulse 84   Temp (!) 97 °F (36 1 °C) (Tympanic)   Resp 20   Ht 5' 11" (1 803 m)   Wt 110 kg (242 lb)   SpO2 97%   BMI 33 75 kg/m²     Physical Exam  Vitals and nursing note reviewed  Constitutional:       General: He is not in acute distress  Appearance: He is well-developed  HENT:      Head: Normocephalic and atraumatic  Right Ear: External ear normal       Left Ear: External ear normal       Nose: Nose normal       Mouth/Throat:      Pharynx: No oropharyngeal exudate  Eyes:      Conjunctiva/sclera: Conjunctivae normal       Pupils: Pupils are equal, round, and reactive to light  Neck:      Thyroid: No thyromegaly  Trachea: No tracheal deviation  Cardiovascular:      Rate and Rhythm: Normal rate and regular rhythm  Heart sounds: Normal heart sounds  No murmur heard  No friction rub  Pulmonary:      Effort: Pulmonary effort is normal  No respiratory distress  Breath sounds: Normal breath sounds  No wheezing or rales  Abdominal:      General: Bowel sounds are normal  There is no distension  Palpations: Abdomen is soft  Tenderness: There is no abdominal tenderness  There is no guarding or rebound  Musculoskeletal:         General: No tenderness  Normal range of motion  Cervical back: Normal range of motion and neck supple  Lymphadenopathy:      Cervical: No cervical adenopathy  Skin:     General: Skin is warm and dry  Findings: No erythema or rash  Neurological:      Mental Status: He is alert and oriented to person, place, and time  Cranial Nerves: No cranial nerve deficit  Coordination: Coordination normal    Psychiatric:         Behavior: Behavior normal          Thought Content:  Thought content normal        Hannah Clifton TRAMAINE

## 2022-10-05 ENCOUNTER — TELEPHONE (OUTPATIENT)
Dept: NEUROLOGY | Facility: CLINIC | Age: 52
End: 2022-10-05
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
## 2022-10-21 ENCOUNTER — TELEPHONE (OUTPATIENT)
Dept: NEUROLOGY | Facility: CLINIC | Age: 52
End: 2022-10-21

## 2022-10-21 NOTE — TELEPHONE ENCOUNTER
Patient called to schedule new patient appointment for concussion in December 2021 from an accident with headaches  No testing done  Triage intake sent

## 2022-10-22 DIAGNOSIS — R00.2 PALPITATIONS: ICD-10-CM

## 2022-10-22 DIAGNOSIS — I10 PRIMARY HYPERTENSION: ICD-10-CM

## 2022-10-24 RX ORDER — PROPRANOLOL HYDROCHLORIDE 10 MG/1
10 TABLET ORAL 2 TIMES DAILY PRN
Qty: 180 TABLET | Refills: 1 | Status: SHIPPED | OUTPATIENT
Start: 2022-10-24

## 2022-10-24 RX ORDER — HYDROCHLOROTHIAZIDE 25 MG/1
25 TABLET ORAL DAILY
Qty: 90 TABLET | Refills: 1 | Status: SHIPPED | OUTPATIENT
Start: 2022-10-24 | End: 2022-11-04

## 2022-10-24 NOTE — TELEPHONE ENCOUNTER
Requested Prescriptions     Pending Prescriptions Disp Refills   • propranolol (INDERAL) 10 mg tablet [Pharmacy Med Name: PROPRANOLOL 10 MG TABLET] 180 tablet 1     Sig: TAKE 1 TABLET (10 MG TOTAL) BY MOUTH 2 (TWO) TIMES A DAY AS NEEDED (ANXIETY OR PALPITATIONS)   • hydrochlorothiazide (HYDRODIURIL) 25 mg tablet [Pharmacy Med Name: HYDROCHLOROTHIAZIDE 25 MG TAB] 90 tablet 1     Sig: TAKE 1 TABLET (25 MG TOTAL) BY MOUTH DAILY       LOV 9/30/22 with MS, F/U non scheduled, Labs active

## 2022-11-03 DIAGNOSIS — F41.0 PANIC ATTACKS: ICD-10-CM

## 2022-11-03 NOTE — TELEPHONE ENCOUNTER
Ingrid Doyle called in because he needs a refill on Lorazepam 1 mg , please send [facility]   [address], [address]

## 2022-11-04 ENCOUNTER — OFFICE VISIT (OUTPATIENT)
Dept: FAMILY MEDICINE CLINIC | Facility: CLINIC | Age: 52
End: 2022-11-04

## 2022-11-04 VITALS
BODY MASS INDEX: 32.76 KG/M2 | SYSTOLIC BLOOD PRESSURE: 112 MMHG | WEIGHT: 234 LBS | DIASTOLIC BLOOD PRESSURE: 70 MMHG | HEIGHT: 71 IN | HEART RATE: 82 BPM | OXYGEN SATURATION: 97 %

## 2022-11-04 DIAGNOSIS — I10 PRIMARY HYPERTENSION: Primary | ICD-10-CM

## 2022-11-04 DIAGNOSIS — F41.0 PANIC ATTACKS: ICD-10-CM

## 2022-11-04 DIAGNOSIS — I10 BENIGN ESSENTIAL HYPERTENSION: ICD-10-CM

## 2022-11-04 DIAGNOSIS — F41.9 ANXIETY: ICD-10-CM

## 2022-11-04 DIAGNOSIS — E78.2 MIXED HYPERLIPIDEMIA: ICD-10-CM

## 2022-11-04 RX ORDER — LORAZEPAM 1 MG/1
1 TABLET ORAL DAILY PRN
Qty: 10 TABLET | Refills: 0 | Status: CANCELLED | OUTPATIENT
Start: 2022-11-04

## 2022-11-04 RX ORDER — LORAZEPAM 1 MG/1
1 TABLET ORAL DAILY PRN
Qty: 15 TABLET | Refills: 0 | Status: SHIPPED | OUTPATIENT
Start: 2022-11-04

## 2022-11-04 RX ORDER — ESCITALOPRAM OXALATE 5 MG/1
5 TABLET ORAL DAILY
Qty: 30 TABLET | Refills: 2 | Status: SHIPPED | OUTPATIENT
Start: 2022-11-04

## 2022-11-04 RX ORDER — LOSARTAN POTASSIUM AND HYDROCHLOROTHIAZIDE 25; 100 MG/1; MG/1
1 TABLET ORAL DAILY
Qty: 90 TABLET | Refills: 3 | Status: SHIPPED | OUTPATIENT
Start: 2022-11-04

## 2022-11-04 NOTE — PATIENT INSTRUCTIONS
Assessment/plan:  Primary hypertension  -Presently stable  Patient wishes to complain hydrochlorothiazide and losartan into combination pill  2   Anxiety  -Not at goal   Would recommend patient start daily medication and we discussed possible side effects and options  He will try Lexapro 5 mg daily and reassess in 4-6 weeks as necessary  He may continue propanolol for palpitations and hydroxyzine or lorazepam for more significant anxiety  as needed  3   Mixed hyperlipidemia   - not at goal   Patient continues to follow with cardiologist's recommendations  4   Palpitations  -Improving with propanolol

## 2022-11-04 NOTE — PROGRESS NOTES
Name: Carlos Enrique Conde Sr       : 1970      MRN: [id number]  Encounter Provider: Marta Dixon PA-C  Encounter Date: 2022   Encounter department: [facility] PRIMARY CARE    Assessment & Plan     Patient Instructions     Assessment/plan:  Primary hypertension  -Presently stable  Patient wishes to complain hydrochlorothiazide and losartan into combination pill  2   Anxiety  -Not at goal   Would recommend patient start daily medication and we discussed possible side effects and options  He will try Lexapro 5 mg daily and reassess in 4-6 weeks as necessary  He may continue propanolol for palpitations and hydroxyzine or lorazepam for more significant anxiety  as needed  3   Mixed hyperlipidemia   - not at goal   Patient continues to follow with cardiologist's recommendations  4   Palpitations  -Improving with propanolol  1  Primary hypertension    2  Anxiety  -     escitalopram (LEXAPRO) 5 mg tablet; Take 1 tablet (5 mg total) by mouth daily    3  Mixed hyperlipidemia    4  Benign essential hypertension  -     losartan-hydrochlorothiazide (HYZAAR) 100-25 MG per tablet; Take 1 tablet by mouth daily    5  Panic attacks  -     LORazepam (ATIVAN) 1 mg tablet; Take 1 tablet (1 mg total) by mouth daily as needed (severe anxiety)         Subjective        HPI:  This is a 29-year-old gentleman that presents to the office mostly for symptoms of anxiety which are not well controlled with as needed medications  He was recently seen by cardiologist who agreed he should be on daily medication  for anxiety  At our last visit he was having palpitations and was very concerned about going on long-term medication or daily treatments  He has been using propanolol as necessary for palpitations or racing heart  He has been using hydroxyzine when necessary for anxiety symptoms and if he is having more significant episodes he will use lorazepam as necessary    Despite this he does continue with regular anxiety and is more open to the idea of trying something daily  Review of Systems   Constitutional: Negative for chills, fatigue and fever  HENT: Negative for congestion, ear pain and sinus pressure  Eyes: Negative for visual disturbance  Respiratory: Negative for cough, chest tightness and shortness of breath  Cardiovascular: Negative for chest pain and palpitations  Gastrointestinal: Negative for diarrhea, nausea and vomiting  Endocrine: Negative for polyuria  Genitourinary: Negative for dysuria and frequency  Musculoskeletal: Negative for arthralgias and myalgias  Skin: Negative for pallor and rash  Neurological: Negative for dizziness, weakness, light-headedness, numbness and headaches  Psychiatric/Behavioral: Negative for agitation, behavioral problems and sleep disturbance  All other systems reviewed and are negative  Current Outpatient Medications on File Prior to Visit   Medication Sig   • hydrOXYzine HCL (ATARAX) 25 mg tablet Take 50 mg by mouth every 6 (six) hours as needed   • Krill Oil 1000 MG CAPS krill oil   • latanoprost (XALATAN) 0 005 % ophthalmic solution INSTILL ONE DROP IN BOTH EYES AT BEDTIME   • Multiple Vitamins-Minerals (MULTIVITAMIN ADULT EXTRA C PO) one daily   • neomycin-polymyxin-dexamethasone (MAXITROL) 0 35%-10,000 units/g-0 1%    • propranolol (INDERAL) 10 mg tablet TAKE 1 TABLET (10 MG TOTAL) BY MOUTH 2 (TWO) TIMES A DAY AS NEEDED (ANXIETY OR PALPITATIONS)   • [DISCONTINUED] hydrochlorothiazide (HYDRODIURIL) 25 mg tablet TAKE 1 TABLET (25 MG TOTAL) BY MOUTH DAILY     • [DISCONTINUED] LORazepam (ATIVAN) 1 mg tablet Take 1 tablet (1 mg total) by mouth daily as needed (severe anxiety)   • [DISCONTINUED] losartan (COZAAR) 100 MG tablet Take 1 tablet (100 mg total) by mouth daily   • Glucosamine Sulfate (SYNOVACIN PO) Glucosamine (Patient not taking: No sig reported)   • methocarbamol (ROBAXIN) 500 mg tablet Take 1 tablet (500 mg total) by mouth 3 (three) times a day as needed for muscle spasms (Patient not taking: No sig reported)       Objective     /70 (BP Location: Left arm, Patient Position: Sitting, Cuff Size: Large)   Pulse 82   Ht 5' 11" (1 803 m)   Wt 106 kg (234 lb)   SpO2 97%   BMI 32 64 kg/m²     Physical Exam  Vitals and nursing note reviewed  Constitutional:       General: He is not in acute distress  Appearance: He is well-developed  HENT:      Head: Normocephalic and atraumatic  Right Ear: External ear normal       Left Ear: External ear normal       Nose: Nose normal       Mouth/Throat:      Pharynx: No oropharyngeal exudate  Eyes:      Conjunctiva/sclera: Conjunctivae normal       Pupils: Pupils are equal, round, and reactive to light  Neck:      Thyroid: No thyromegaly  Trachea: No tracheal deviation  Cardiovascular:      Rate and Rhythm: Normal rate and regular rhythm  Heart sounds: Normal heart sounds  No murmur heard  No friction rub  Pulmonary:      Effort: Pulmonary effort is normal  No respiratory distress  Breath sounds: Normal breath sounds  No wheezing or rales  Abdominal:      General: Bowel sounds are normal  There is no distension  Palpations: Abdomen is soft  Tenderness: There is no abdominal tenderness  There is no guarding or rebound  Musculoskeletal:         General: No tenderness  Normal range of motion  Cervical back: Normal range of motion and neck supple  Lymphadenopathy:      Cervical: No cervical adenopathy  Skin:     General: Skin is warm and dry  Findings: No erythema or rash  Neurological:      Mental Status: He is alert and oriented to person, place, and time  Cranial Nerves: No cranial nerve deficit  Coordination: Coordination normal    Psychiatric:         Behavior: Behavior normal          Thought Content:  Thought content normal        Ashlyn oGnzalez PA-C

## 2022-11-22 ENCOUNTER — TELEPHONE (OUTPATIENT)
Dept: FAMILY MEDICINE CLINIC | Facility: CLINIC | Age: 52
End: 2022-11-22

## 2022-11-22 NOTE — TELEPHONE ENCOUNTER
Medication: lorazepam 1mg  Day supply: 30  Pharmacy: [id number]    Last office visit: 11/4/2022    Upcoming office visit: 12/5/2022     patient only has 1 pill left

## 2022-11-23 ENCOUNTER — TELEPHONE (OUTPATIENT)
Dept: PSYCHIATRY | Facility: CLINIC | Age: 52
End: 2022-11-23

## 2022-11-23 DIAGNOSIS — F41.9 ANXIETY: Primary | ICD-10-CM

## 2022-11-23 DIAGNOSIS — F41.0 PANIC ATTACKS: ICD-10-CM

## 2022-11-23 RX ORDER — LORAZEPAM 1 MG/1
1 TABLET ORAL DAILY PRN
Qty: 15 TABLET | Refills: 0 | Status: SHIPPED | OUTPATIENT
Start: 2022-11-23

## 2022-11-23 NOTE — TELEPHONE ENCOUNTER
A message was left for pt to return call to intake dept to schedule  Pt will be added to wait list at that time

## 2022-11-23 NOTE — TELEPHONE ENCOUNTER
I will send in refill of the medication but appears he is needing it more frequently, almost daily since it was filled on the 4th of November  I would recommend he consult with specialist and I will place order for this

## 2022-11-25 NOTE — TELEPHONE ENCOUNTER
Spoke to pt, Aware as per MS, Pt put in an order for pt to see Phycology due to pt using the Lorazepam more often

## 2022-11-26 DIAGNOSIS — F41.9 ANXIETY: ICD-10-CM

## 2022-11-28 RX ORDER — ESCITALOPRAM OXALATE 5 MG/1
5 TABLET ORAL DAILY
Qty: 90 TABLET | Refills: 1 | Status: SHIPPED | OUTPATIENT
Start: 2022-11-28

## 2022-11-29 ENCOUNTER — TELEPHONE (OUTPATIENT)
Dept: FAMILY MEDICINE CLINIC | Facility: CLINIC | Age: 52
End: 2022-11-29

## 2022-11-29 NOTE — TELEPHONE ENCOUNTER
Patient called in and stating that his daughter recently came down with the flu, and he has been experiencing some body aches  Patient was just calling in to see what he can take for this with the current medications he is on now  Please advise   Thank you

## 2022-12-01 ENCOUNTER — OFFICE VISIT (OUTPATIENT)
Dept: FAMILY MEDICINE CLINIC | Facility: CLINIC | Age: 52
End: 2022-12-01

## 2022-12-01 ENCOUNTER — TELEPHONE (OUTPATIENT)
Dept: PSYCHIATRY | Facility: CLINIC | Age: 52
End: 2022-12-01

## 2022-12-01 VITALS
HEIGHT: 71 IN | SYSTOLIC BLOOD PRESSURE: 122 MMHG | DIASTOLIC BLOOD PRESSURE: 74 MMHG | TEMPERATURE: 96.8 F | HEART RATE: 71 BPM | BODY MASS INDEX: 32.93 KG/M2 | RESPIRATION RATE: 16 BRPM | WEIGHT: 235.2 LBS

## 2022-12-01 DIAGNOSIS — F41.9 ANXIETY: ICD-10-CM

## 2022-12-01 DIAGNOSIS — J40 BRONCHITIS: Primary | ICD-10-CM

## 2022-12-01 DIAGNOSIS — I10 PRIMARY HYPERTENSION: ICD-10-CM

## 2022-12-01 RX ORDER — SULFAMETHOXAZOLE AND TRIMETHOPRIM 800; 160 MG/1; MG/1
1 TABLET ORAL EVERY 12 HOURS SCHEDULED
Qty: 20 TABLET | Refills: 0 | Status: SHIPPED | OUTPATIENT
Start: 2022-12-01 | End: 2022-12-11

## 2022-12-01 RX ORDER — BENZONATATE 200 MG/1
200 CAPSULE ORAL 3 TIMES DAILY PRN
Qty: 30 CAPSULE | Refills: 0 | Status: SHIPPED | OUTPATIENT
Start: 2022-12-01 | End: 2022-12-11

## 2022-12-01 RX ORDER — METOPROLOL SUCCINATE 25 MG/1
25 TABLET, EXTENDED RELEASE ORAL DAILY
COMMUNITY
Start: 2022-11-07 | End: 2023-11-07

## 2022-12-01 NOTE — PROGRESS NOTES
Name: Awilda Zuniga      : 1970      MRN: [id number]  Encounter Provider: Edgardo Mg PA-C  Encounter Date: 2022   Encounter department: [address] [place] [address] PRIMARY CARE    Assessment & Plan     Patient Instructions     Assessment/plan:  Bronchitis-patient does have some crackles and wheezes on exam   He will be started on Bactrim DS, 1 tablet twice daily for 10 days since he is allergic to penicillin products  I will send out COVID/flu PCR testing  He did have exposure to influenza a last week in his home  He is 3-4 days into symptoms and would be too late to treat with Tamiflu at this point  2  Hypertension-stable  Would recommend avoiding decongestants as he has had problems with palpitations and elevated blood pressure previously  3   Mixed hyperlipidemia -stable, no medication changes  4   Anxiety - stable  Patient has noticed a mild benefit with Lexapro  He has not needed lorazepam as frequently  1  Bronchitis  -     Covid/Flu- Office Collect  -     sulfamethoxazole-trimethoprim (BACTRIM DS) 800-160 mg per tablet; Take 1 tablet by mouth every 12 (twelve) hours for 10 days  -     benzonatate (TESSALON) 200 MG capsule; Take 1 capsule (200 mg total) by mouth 3 (three) times a day as needed for cough for up to 10 days    2  Primary hypertension    3  Anxiety         Subjective        HPI: This is a 78-year-old gentleman that presents to the office with concerns over cough and chest congestion which started about   3-4 days ago  His daughter was recently sick with influenza a last week and is living in his lower level of the home  He has tried to limit exposure but his wife and he both have similar symptoms  Cough has been mostly dry but he is feeling a little bit of rattling or wheezing within the chest   He is not had any shortness of breath or difficulty getting around the house  He does have some sinus congestion bit of sore throat    He is not had any fevers that he is aware of but he has had some subjective chills and sweats  Review of Systems   Constitutional: Positive for fatigue  Negative for appetite change, chills, diaphoresis and fever  HENT: Positive for congestion  Negative for ear pain, facial swelling, postnasal drip, rhinorrhea, sinus pressure, sinus pain, sore throat, trouble swallowing and voice change  Respiratory: Positive for cough and wheezing  Negative for chest tightness and shortness of breath  Cardiovascular: Negative for chest pain  Gastrointestinal: Negative for abdominal pain, diarrhea, nausea and vomiting  Musculoskeletal: Negative for myalgias  Neurological: Negative for headaches  Current Outpatient Medications on File Prior to Visit   Medication Sig   • escitalopram (LEXAPRO) 5 mg tablet TAKE 1 TABLET (5 MG TOTAL) BY MOUTH DAILY     • hydrOXYzine HCL (ATARAX) 25 mg tablet Take 50 mg by mouth every 6 (six) hours as needed   • Krill Oil 1000 MG CAPS krill oil   • latanoprost (XALATAN) 0 005 % ophthalmic solution INSTILL ONE DROP IN BOTH EYES AT BEDTIME   • LORazepam (ATIVAN) 1 mg tablet Take 1 tablet (1 mg total) by mouth daily as needed (severe anxiety)   • losartan-hydrochlorothiazide (HYZAAR) 100-25 MG per tablet Take 1 tablet by mouth daily   • methocarbamol (ROBAXIN) 500 mg tablet Take 1 tablet (500 mg total) by mouth 3 (three) times a day as needed for muscle spasms   • metoprolol succinate (TOPROL-XL) 25 mg 24 hr tablet Take 25 mg by mouth daily   • Multiple Vitamins-Minerals (MULTIVITAMIN ADULT EXTRA C PO) one daily   • neomycin-polymyxin-dexamethasone (MAXITROL) 0 35%-10,000 units/g-0 1%    • propranolol (INDERAL) 10 mg tablet TAKE 1 TABLET (10 MG TOTAL) BY MOUTH 2 (TWO) TIMES A DAY AS NEEDED (ANXIETY OR PALPITATIONS)   • Glucosamine Sulfate (SYNOVACIN PO) Glucosamine (Patient not taking: Reported on 9/30/2022)       Objective     /74 (BP Location: Left arm, Patient Position: Sitting, Cuff Size: Adult)   Pulse 71 Temp (!) 96 8 °F (36 °C) (Temporal)   Resp 16   Ht 5' 11" (1 803 m)   Wt 107 kg (235 lb 3 2 oz)   BMI 32 80 kg/m²     Physical Exam  Vitals and nursing note reviewed  Constitutional:       General: He is not in acute distress  Appearance: He is well-developed and well-nourished  HENT:      Head: Normocephalic and atraumatic  Right Ear: External ear normal       Left Ear: External ear normal       Nose: Nose normal       Mouth/Throat:      Mouth: Oropharynx is clear and moist       Pharynx: No oropharyngeal exudate  Eyes:      Extraocular Movements: EOM normal       Conjunctiva/sclera: Conjunctivae normal       Pupils: Pupils are equal, round, and reactive to light  Neck:      Thyroid: No thyromegaly  Trachea: No tracheal deviation  Cardiovascular:      Rate and Rhythm: Normal rate and regular rhythm  Heart sounds: Normal heart sounds  No murmur heard  No friction rub  Pulmonary:      Effort: Pulmonary effort is normal  No respiratory distress  Breath sounds: Wheezing present  No rales  Comments:   Crackles and scant wheezes bilateral lung fields  Abdominal:      General: Bowel sounds are normal  There is no distension  Palpations: Abdomen is soft  Tenderness: There is no abdominal tenderness  There is no guarding or rebound  Musculoskeletal:         General: No tenderness or edema  Normal range of motion  Cervical back: Normal range of motion and neck supple  Lymphadenopathy:      Cervical: No cervical adenopathy  Skin:     General: Skin is warm and dry  Findings: No erythema or rash  Neurological:      Mental Status: He is alert and oriented to person, place, and time  Cranial Nerves: No cranial nerve deficit  Coordination: Coordination normal    Psychiatric:         Mood and Affect: Mood and affect normal          Behavior: Behavior normal          Thought Content:  Thought content normal        Woody Pedro PA-C

## 2022-12-01 NOTE — TELEPHONE ENCOUNTER
Reached out to pt in regards to scheduling services in [facility], pt stated he does not need services   Referral is being closed at this time

## 2022-12-01 NOTE — PATIENT INSTRUCTIONS
Assessment/plan:  Bronchitis-patient does have some crackles and wheezes on exam   He will be started on Bactrim DS, 1 tablet twice daily for 10 days since he is allergic to penicillin products  I will send out COVID/flu PCR testing  He did have exposure to influenza a last week in his home  He is 3-4 days into symptoms and would be too late to treat with Tamiflu at this point  2  Hypertension-stable  Would recommend avoiding decongestants as he has had problems with palpitations and elevated blood pressure previously  3   Mixed hyperlipidemia -stable, no medication changes  4   Anxiety - stable  Patient has noticed a mild benefit with Lexapro  He has not needed lorazepam as frequently

## 2022-12-02 LAB
FLUAV RNA RESP QL NAA+PROBE: POSITIVE
FLUBV RNA RESP QL NAA+PROBE: NEGATIVE
SARS-COV-2 RNA RESP QL NAA+PROBE: NEGATIVE

## 2022-12-05 ENCOUNTER — TELEPHONE (OUTPATIENT)
Dept: FAMILY MEDICINE CLINIC | Facility: CLINIC | Age: 52
End: 2022-12-05

## 2022-12-05 DIAGNOSIS — J40 BRONCHITIS: Primary | ICD-10-CM

## 2022-12-05 RX ORDER — PREDNISONE 10 MG/1
TABLET ORAL
Qty: 21 TABLET | Refills: 0 | Status: SHIPPED | OUTPATIENT
Start: 2022-12-05 | End: 2022-12-11

## 2022-12-05 NOTE — TELEPHONE ENCOUNTER
Izzy Escamilla called in because he is still feeling fatigue and very nauseous wants to know how long is this suppose to last

## 2022-12-05 NOTE — TELEPHONE ENCOUNTER
He should only be long-term through the antibiotic therapy at this point  I can add a prednisone pack that may help with inflammation in the sinuses and chest   He should continue antibiotic therapy as well

## 2022-12-23 ENCOUNTER — OFFICE VISIT (OUTPATIENT)
Dept: UROLOGY | Facility: MEDICAL CENTER | Age: 52
End: 2022-12-23

## 2022-12-23 VITALS
HEART RATE: 66 BPM | BODY MASS INDEX: 33.04 KG/M2 | WEIGHT: 236 LBS | DIASTOLIC BLOOD PRESSURE: 78 MMHG | HEIGHT: 71 IN | SYSTOLIC BLOOD PRESSURE: 132 MMHG | OXYGEN SATURATION: 97 %

## 2022-12-23 DIAGNOSIS — N40.0 BPH WITHOUT OBSTRUCTION/LOWER URINARY TRACT SYMPTOMS: Primary | ICD-10-CM

## 2022-12-23 DIAGNOSIS — Z12.5 PROSTATE CANCER SCREENING: ICD-10-CM

## 2022-12-23 DIAGNOSIS — N41.0 ACUTE PROSTATITIS: ICD-10-CM

## 2022-12-23 LAB
POST-VOID RESIDUAL VOLUME, ML POC: 14 ML
SL AMB  POCT GLUCOSE, UA: NORMAL
SL AMB LEUKOCYTE ESTERASE,UA: NORMAL
SL AMB POCT BILIRUBIN,UA: NORMAL
SL AMB POCT BLOOD,UA: NORMAL
SL AMB POCT CLARITY,UA: CLEAR
SL AMB POCT COLOR,UA: YELLOW
SL AMB POCT KETONES,UA: NORMAL
SL AMB POCT NITRITE,UA: NORMAL
SL AMB POCT PH,UA: 7
SL AMB POCT SPECIFIC GRAVITY,UA: 1.02
SL AMB POCT URINE PROTEIN: NORMAL
SL AMB POCT UROBILINOGEN: 0.2

## 2022-12-23 RX ORDER — DOXYCYCLINE 100 MG/1
100 TABLET ORAL 2 TIMES DAILY
Qty: 28 TABLET | Refills: 1 | Status: SHIPPED | OUTPATIENT
Start: 2022-12-23 | End: 2023-01-06

## 2022-12-23 NOTE — PROGRESS NOTES
12/23/2022      Chief Complaint   Patient presents with   • Annual Exam         Assessment and Plan    47 y o  male managed by Dr Lopez Credit     1  Chronic prostatitis   • Suspect acute flare up  Doxycycline 100 mg po bid x 14 days with 1 refill sent to pharmacy  • Follow up in 4 weeks if no improvement       2  BPH with lower urinary tract symptoms   • Patient continues to defer oral medication at this time given potential side effects  If no improvement in symptoms on antibiotic above, strongly recommend trying alpha blocker  • Urine today: negative   • PVR: 14 mL   • AUA score: 26   • CT scan in 2020 was negative for enlarged prostate  • Patient does not routinely consume bladder irritants  • Does remain well hydrated during the day with 40 60 oz of water  • Encourage to continue daily Kegel exercises  • Discussed pelvic floor physical therapy  Patient declines  • See plan above      3  Prostate cancer screening  • PSA: 0 8 (3/5/22)  • Previous PSAs: 0 7 (8/30/21), 0 7 (9/28/20), 0 7 (1/11/19), 0 7 (8/10/16)   • Denies family history  • OWEN today revealed smooth, mildly enlarged prostate, tender to palpation  • Follow up in 1 year         History of Present Illness  Edvin Clayton is a 47 y o  male here for evaluation of chronic prostatitis, BPH, prostate cancer screening  Patient reports current flareup of his chronic prostatitis  He was recently treated with antibiotics and steroids for bronchitis and he notes an increase in his discomfort urinary symptoms   He notes persistent discomfort and pressure in his perineum  Fortunately he denies any dysuria, difficulty voiding, or gross hematuria  He does continue to have issues with urinary frequency, weak stream, and nocturia  He has declined alpha blockers in the past due to potential side effects  CT in 2020 was negative for prostatic enlargement  His PSA remained stable this year  Patient has previous history of hypogonadism treated with TRT  Testosterone testing in both 2020 and 2021 were stable in the 300s rendering him not a candidate for TRT  Review of Systems   Constitutional: Negative for chills and fever  HENT: Negative  Respiratory: Negative  Cardiovascular: Negative  Genitourinary: Positive for urgency (denies urge urinary incontinence)  Negative for difficulty urinating, dysuria, frequency and hematuria  Continues to have weak stream, worse now than in the past  Nocturia 3x per night, can be 6-7x per night  Has improved with decreasing fluids later in the evening  Neurological: Negative  AUA SYMPTOM SCORE    Flowsheet Row Most Recent Value   AUA SYMPTOM SCORE    How often have you had a sensation of not emptying your bladder completely after you finished urinating? 5   How often have you had to urinate again less than two hours after you finished urinating? 5   How often have you found you stopped and started again several times when you urinate? 5   How often have you found it difficult to postpone urination? 2   How often have you had a weak urinary stream? 4   How often have you had to push or strain to begin urination? 2   How many times did you most typically get up to urinate from the time you went to bed at night until the time you got up in the morning? 3   Quality of Life: If you were to spend the rest of your life with your urinary condition just the way it is now, how would you feel about that? 4   AUA SYMPTOM SCORE 26           Vitals  Vitals:    12/23/22 0924   BP: 132/78   Pulse: 66   SpO2: 97%   Weight: 107 kg (236 lb)   Height: 5' 11" (1 803 m)       Physical Exam  Vitals reviewed  Constitutional:       General: He is not in acute distress  Appearance: Normal appearance  He is obese  He is not ill-appearing, toxic-appearing or diaphoretic  HENT:      Head: Normocephalic and atraumatic     Eyes:      Conjunctiva/sclera: Conjunctivae normal    Pulmonary:      Effort: Pulmonary effort is normal  No respiratory distress  Abdominal:      General: There is no distension  Palpations: Abdomen is soft  Tenderness: There is no abdominal tenderness  There is no right CVA tenderness, left CVA tenderness, guarding or rebound  Genitourinary:     Comments: Circumcised penis, normal phallus, orthotopic patent meatus  Testes smooth descended bilaterally into the scrotum nontender with no palpable mass  OWEN today revealed smooth, mildly enlarged prostate  Boggy prostate not appreciated but tender to palpation  Musculoskeletal:         General: Normal range of motion  Cervical back: Normal range of motion  Skin:     General: Skin is warm and dry  Neurological:      General: No focal deficit present  Mental Status: He is alert and oriented to person, place, and time  Psychiatric:         Mood and Affect: Mood normal          Behavior: Behavior normal          Thought Content: Thought content normal          Judgment: Judgment normal            Past History  Past Medical History:   Diagnosis Date   • Acute prostatitis 7/8/2020   • Benign prostatic hyperplasia without lower urinary tract symptoms    • Chronic prostatitis    • Epididymitis 1/4/2018   • Erectile dysfunction    • Hypertension    • Stress fracture of tibia 4/9/2019    Sees [facility]     • Testicular hypogonadism      Social History     Socioeconomic History   • Marital status: /Civil Union     Spouse name: None   • Number of children: None   • Years of education: None   • Highest education level: None   Occupational History   • None   Tobacco Use   • Smoking status: Never   • Smokeless tobacco: Never   Vaping Use   • Vaping Use: Never used   Substance and Sexual Activity   • Alcohol use: No     Comment: Per [employer]: drinks socially   • Drug use: No   • Sexual activity: None   Other Topics Concern   • None   Social History Narrative   • None     Social Determinants of Health     Financial Resource Strain: Not on file   Food Insecurity: Not on file   Transportation Needs: Not on file   Physical Activity: Not on file   Stress: Not on file   Social Connections: Not on file   Intimate Partner Violence: Not on file   Housing Stability: Not on file     Social History     Tobacco Use   Smoking Status Never   Smokeless Tobacco Never     Family History   Problem Relation Age of Onset   • Breast cancer Mother    • Arthritis Father    • Colon cancer Father    • Coronary artery disease Father    • Nephrolithiasis Father    • Lung cancer Father    • Testicular cancer Father    • Osteoporosis Father    • Cerebral palsy Brother        The following portions of the patient's history were reviewed and updated as appropriate: allergies, current medications, past medical history, past social history, past surgical history and problem list     Results  No results found for this or any previous visit (from the past 1 hour(s)) ]  Lab Results   Component Value Date    PSA 0 8 03/05/2022    PSA 0 7 08/30/2021    PSA 0 7 09/28/2020    PSA 0 7 01/11/2019     Lab Results   Component Value Date    CALCIUM 9 3 03/05/2022    K 4 5 03/05/2022    CO2 28 03/05/2022     (H) 03/05/2022    BUN 19 03/05/2022    CREATININE 1 18 03/05/2022     Lab Results   Component Value Date    WBC 3 85 (L) 03/05/2022    HGB 14 4 03/05/2022    HCT 43 0 03/05/2022    MCV 89 03/05/2022     03/05/2022     Catrina Kasper PA-C

## 2023-01-31 ENCOUNTER — HOSPITAL ENCOUNTER (OUTPATIENT)
Dept: CT IMAGING | Facility: HOSPITAL | Age: 53
Discharge: HOME | End: 2023-01-31

## 2023-01-31 ENCOUNTER — APPOINTMENT (OUTPATIENT)
Dept: LAB | Facility: HOSPITAL | Age: 53
End: 2023-01-31

## 2023-01-31 ENCOUNTER — OFFICE VISIT (OUTPATIENT)
Dept: FAMILY MEDICINE CLINIC | Facility: CLINIC | Age: 53
End: 2023-01-31

## 2023-01-31 VITALS
OXYGEN SATURATION: 99 % | HEIGHT: 71 IN | HEART RATE: 74 BPM | BODY MASS INDEX: 35.14 KG/M2 | RESPIRATION RATE: 20 BRPM | DIASTOLIC BLOOD PRESSURE: 86 MMHG | WEIGHT: 251 LBS | TEMPERATURE: 99 F | SYSTOLIC BLOOD PRESSURE: 120 MMHG

## 2023-01-31 DIAGNOSIS — R63.5 WEIGHT GAIN: ICD-10-CM

## 2023-01-31 DIAGNOSIS — R10.31 RIGHT LOWER QUADRANT ABDOMINAL PAIN: ICD-10-CM

## 2023-01-31 DIAGNOSIS — E66.01 SEVERE OBESITY (BMI 35.0-39.9) WITH COMORBIDITY (HCC): ICD-10-CM

## 2023-01-31 DIAGNOSIS — R14.0 ABDOMINAL BLOATING: ICD-10-CM

## 2023-01-31 DIAGNOSIS — R10.31 RIGHT LOWER QUADRANT ABDOMINAL PAIN: Primary | ICD-10-CM

## 2023-01-31 DIAGNOSIS — I10 PRIMARY HYPERTENSION: ICD-10-CM

## 2023-01-31 LAB
ALBUMIN SERPL BCP-MCNC: 4.5 G/DL (ref 3.5–5)
ALP SERPL-CCNC: 57 U/L (ref 43–122)
ALT SERPL W P-5'-P-CCNC: 94 U/L
AMYLASE SERPL-CCNC: 77 IU/L (ref 30–110)
ANION GAP SERPL CALCULATED.3IONS-SCNC: 9 MMOL/L (ref 5–14)
AST SERPL W P-5'-P-CCNC: 60 U/L (ref 17–59)
BILIRUB SERPL-MCNC: 0.59 MG/DL (ref 0.2–1)
BILIRUB UR QL STRIP: NEGATIVE
BUN SERPL-MCNC: 21 MG/DL (ref 5–25)
CALCIUM SERPL-MCNC: 9.5 MG/DL (ref 8.4–10.2)
CHLORIDE SERPL-SCNC: 100 MMOL/L (ref 96–108)
CLARITY UR: CLEAR
CO2 SERPL-SCNC: 31 MMOL/L (ref 21–32)
COLOR UR: NORMAL
CREAT SERPL-MCNC: 0.97 MG/DL (ref 0.7–1.5)
ERYTHROCYTE [DISTWIDTH] IN BLOOD BY AUTOMATED COUNT: 11.9 % (ref 11.6–15.1)
GFR SERPL CREATININE-BSD FRML MDRD: 88 ML/MIN/1.73SQ M
GLUCOSE SERPL-MCNC: 97 MG/DL (ref 70–99)
GLUCOSE UR STRIP-MCNC: NEGATIVE MG/DL
HCT VFR BLD AUTO: 46.3 % (ref 36.5–49.3)
HGB BLD-MCNC: 15.1 G/DL (ref 12–17)
HGB UR QL STRIP.AUTO: NEGATIVE
KETONES UR STRIP-MCNC: NEGATIVE MG/DL
LEUKOCYTE ESTERASE UR QL STRIP: NEGATIVE
LIPASE SERPL-CCNC: 109 U/L (ref 23–300)
MCH RBC QN AUTO: 29.3 PG (ref 26.8–34.3)
MCHC RBC AUTO-ENTMCNC: 32.6 G/DL (ref 31.4–37.4)
MCV RBC AUTO: 90 FL (ref 82–98)
NITRITE UR QL STRIP: NEGATIVE
PH UR STRIP.AUTO: 7 [PH]
PLATELET # BLD AUTO: 186 THOUSANDS/UL (ref 149–390)
PMV BLD AUTO: 9.8 FL (ref 8.9–12.7)
POTASSIUM SERPL-SCNC: 4.4 MMOL/L (ref 3.5–5.3)
PROT SERPL-MCNC: 7.6 G/DL (ref 6.4–8.4)
PROT UR STRIP-MCNC: NEGATIVE MG/DL
RBC # BLD AUTO: 5.15 MILLION/UL (ref 3.88–5.62)
SODIUM SERPL-SCNC: 140 MMOL/L (ref 135–147)
SP GR UR STRIP.AUTO: 1.01 (ref 1–1.04)
TSH SERPL DL<=0.05 MIU/L-ACNC: 2.02 UIU/ML (ref 0.45–4.5)
UROBILINOGEN UA: NEGATIVE MG/DL
WBC # BLD AUTO: 6.26 THOUSAND/UL (ref 4.31–10.16)

## 2023-01-31 RX ADMIN — IOHEXOL 100 ML: 350 INJECTION, SOLUTION INTRAVENOUS at 13:30

## 2023-01-31 NOTE — PROGRESS NOTES
Name: Lilliana Dias      : 1970      MRN: [id number]  Encounter Provider: Nina Hobson DO  Encounter Date: 2023   Encounter department: [address] [place] [address]   #1  Abdominal pain with tenderness right lower quadrant over McBurney's point  2  Per abdominal bloating  3  BMI 35 01 with 15 pound weight gain  Blood work is ordered  Stat CT abdomen pelvis is ordered  4  Hypertension, stable continue present therapy #5  Recheck sooner if needed  If patient's abdominal pain worsens report to ER        1  Right lower quadrant abdominal pain  -     CBC; Future; Expected date: 2023  -     Comprehensive metabolic panel; Future; Expected date: 2023  -     TSH, 3rd generation with Free T4 reflex; Future; Expected date: 2023  -     UA (URINE) with reflex to Scope; Future; Expected date: 2023  -     Amylase; Future; Expected date: 2023  -     Lipase; Future; Expected date: 2023  -     CT abdomen pelvis w contrast; Future; Expected date: 2023    2  Abdominal bloating  -     CBC; Future; Expected date: 2023  -     Comprehensive metabolic panel; Future; Expected date: 2023  -     TSH, 3rd generation with Free T4 reflex; Future; Expected date: 2023  -     UA (URINE) with reflex to Scope; Future; Expected date: 2023  -     Amylase; Future; Expected date: 2023  -     Lipase; Future; Expected date: 2023  -     CT abdomen pelvis w contrast; Future; Expected date: 2023    3  Weight gain  -     CBC; Future; Expected date: 2023  -     Comprehensive metabolic panel; Future; Expected date: 2023  -     TSH, 3rd generation with Free T4 reflex; Future; Expected date: 2023  -     UA (URINE) with reflex to Scope; Future; Expected date: 2023  -     Amylase; Future; Expected date: 2023  -     Lipase;  Future; Expected date: 2023  -     CT abdomen pelvis w contrast; Future; Expected date: 01/31/2023    4  Severe obesity (BMI 35 0-39  9) with comorbidity ([facility] [address] )  Assessment & Plan:  BMI 35 01 patient gained 15 pounds, with blood work ordered    Orders:  -     CBC; Future; Expected date: 01/31/2023  -     Comprehensive metabolic panel; Future; Expected date: 01/31/2023  -     TSH, 3rd generation with Free T4 reflex; Future; Expected date: 01/31/2023  -     UA (URINE) with reflex to Scope; Future; Expected date: 01/31/2023  -     Amylase; Future; Expected date: 01/31/2023  -     Lipase; Future; Expected date: 01/31/2023  -     CT abdomen pelvis w contrast; Future; Expected date: 01/31/2023    5  Primary hypertension  Assessment & Plan:  Able continue present therapy    Orders:  -     CBC; Future; Expected date: 01/31/2023  -     Comprehensive metabolic panel; Future; Expected date: 01/31/2023  -     TSH, 3rd generation with Free T4 reflex; Future; Expected date: 01/31/2023  -     UA (URINE) with reflex to Scope; Future; Expected date: 01/31/2023  -     Amylase; Future; Expected date: 01/31/2023  -     Lipase; Future; Expected date: 01/31/2023  -     CT abdomen pelvis w contrast; Future; Expected date: 01/31/2023      BMI Counseling: Body mass index is 35 01 kg/m²  The BMI is above normal  Nutrition recommendations include moderation in carbohydrate intake and reducing intake of cholesterol  Exercise recommendations include exercising 3-5 times per week  Rationale for BMI follow-up plan is due to patient being overweight or obese  Depression Screening and Follow-up Plan: Patient was screened for depression during today's encounter  They screened negative with a PHQ-2 score of 0  Subjective      For about a week patient's had "whole abdominal" pain  With abdominal bloating  Patient came in as his 3year-old granddaughter jumped in his abdomen last evening and it "really hurt"  Denies nausea vomiting no constipation diarrhea no melena hematochezia  Denies fever chills    No one else has been sick at home    Review of Systems   Constitutional:        HPI   HENT: Negative  Eyes: Negative  Respiratory: Negative  Cardiovascular: Negative  Gastrointestinal:        HPI   Endocrine: Negative  Genitourinary: Negative  Musculoskeletal: Negative  Skin: Negative  Allergic/Immunologic: Negative  Neurological: Negative  Hematological: Negative  Psychiatric/Behavioral: Negative  Current Outpatient Medications on File Prior to Visit   Medication Sig   • escitalopram (LEXAPRO) 5 mg tablet TAKE 1 TABLET (5 MG TOTAL) BY MOUTH DAILY  • Glucosamine Sulfate (SYNOVACIN PO)    • Krill Oil 1000 MG CAPS krill oil   • latanoprost (XALATAN) 0 005 % ophthalmic solution INSTILL ONE DROP IN BOTH EYES AT BEDTIME   • losartan-hydrochlorothiazide (HYZAAR) 100-25 MG per tablet Take 1 tablet by mouth daily   • metoprolol succinate (TOPROL-XL) 25 mg 24 hr tablet Take 25 mg by mouth daily   • Multiple Vitamins-Minerals (MULTIVITAMIN ADULT EXTRA C PO) one daily   • neomycin-polymyxin-dexamethasone (MAXITROL) 0 35%-10,000 units/g-0 1%    • [DISCONTINUED] LORazepam (ATIVAN) 1 mg tablet Take 1 tablet (1 mg total) by mouth daily as needed (severe anxiety) (Patient not taking: Reported on 12/23/2022)   • [DISCONTINUED] methocarbamol (ROBAXIN) 500 mg tablet Take 1 tablet (500 mg total) by mouth 3 (three) times a day as needed for muscle spasms (Patient not taking: Reported on 12/23/2022)   • [DISCONTINUED] propranolol (INDERAL) 10 mg tablet TAKE 1 TABLET (10 MG TOTAL) BY MOUTH 2 (TWO) TIMES A DAY AS NEEDED (ANXIETY OR PALPITATIONS) (Patient not taking: Reported on 12/23/2022)       Objective     /86 (BP Location: Right arm, Patient Position: Sitting, Cuff Size: Standard)   Pulse 74   Temp 99 °F (37 2 °C) (Tympanic)   Resp 20   Ht 5' 11" (1 803 m)   Wt 114 kg (251 lb)   SpO2 99%   BMI 35 01 kg/m²     Physical Exam  Vitals and nursing note reviewed     Constitutional: Appearance: He is well-developed  HENT:      Head: Normocephalic and atraumatic  Mouth/Throat:      Mouth: Mucous membranes are moist    Eyes:      General: No scleral icterus  Cardiovascular:      Rate and Rhythm: Normal rate and regular rhythm  Heart sounds: Normal heart sounds  Pulmonary:      Effort: Pulmonary effort is normal       Breath sounds: Normal breath sounds  Abdominal:      General: There is no distension  Palpations: Abdomen is soft  There is no mass  Tenderness: There is abdominal tenderness  There is guarding  There is no right CVA tenderness, left CVA tenderness or rebound  Hernia: No hernia is present  Comments: Patient with tenderness over McBurney's point  Question decreased bowel sounds right lower quadrant  Question mild voluntary guarding   Musculoskeletal:      Cervical back: Neck supple  No rigidity  Right lower leg: No edema  Left lower leg: No edema  Skin:     General: Skin is warm and dry  Neurological:      General: No focal deficit present  Mental Status: He is alert     Psychiatric:         Mood and Affect: Mood normal        Jagjit Fuller DO

## 2023-01-31 NOTE — PATIENT INSTRUCTIONS
Complete blood work stat at hospital  Complete CAT scan today  Recheck tomorrow  If symptoms worsen report to ER

## 2023-02-01 ENCOUNTER — OFFICE VISIT (OUTPATIENT)
Dept: FAMILY MEDICINE CLINIC | Facility: CLINIC | Age: 53
End: 2023-02-01

## 2023-02-01 VITALS
WEIGHT: 244 LBS | SYSTOLIC BLOOD PRESSURE: 132 MMHG | HEIGHT: 71 IN | BODY MASS INDEX: 34.16 KG/M2 | DIASTOLIC BLOOD PRESSURE: 86 MMHG | HEART RATE: 87 BPM | OXYGEN SATURATION: 98 % | RESPIRATION RATE: 20 BRPM | TEMPERATURE: 98.4 F

## 2023-02-01 DIAGNOSIS — F41.9 ANXIETY: ICD-10-CM

## 2023-02-01 DIAGNOSIS — K76.0 FATTY LIVER: ICD-10-CM

## 2023-02-01 DIAGNOSIS — R10.31 RIGHT LOWER QUADRANT ABDOMINAL PAIN: Primary | ICD-10-CM

## 2023-02-01 DIAGNOSIS — E66.09 CLASS 1 OBESITY DUE TO EXCESS CALORIES WITH SERIOUS COMORBIDITY AND BODY MASS INDEX (BMI) OF 34.0 TO 34.9 IN ADULT: ICD-10-CM

## 2023-02-01 DIAGNOSIS — I10 PRIMARY HYPERTENSION: ICD-10-CM

## 2023-02-01 PROBLEM — E66.9 OBESITY: Status: ACTIVE | Noted: 2023-01-31

## 2023-02-01 RX ORDER — HYDROCHLOROTHIAZIDE 25 MG/1
TABLET ORAL
COMMUNITY
End: 2023-02-01 | Stop reason: ALTCHOICE

## 2023-02-01 RX ORDER — BENZONATATE 200 MG/1
CAPSULE ORAL
COMMUNITY
End: 2023-02-01

## 2023-02-01 RX ORDER — BRIMONIDINE TARTRATE 2 MG/ML
SOLUTION/ DROPS OPHTHALMIC
COMMUNITY
End: 2023-02-01

## 2023-02-01 RX ORDER — AZITHROMYCIN 250 MG/1
TABLET, FILM COATED ORAL
COMMUNITY
End: 2023-02-01

## 2023-02-01 NOTE — PATIENT INSTRUCTIONS
1  Right lower quadrant abdominal pain  Assessment & Plan:  Patient was having some abdominal pain yesterday and for a bit before that, but not severe at this point  CT scan did not reveal any specific abnormalities other than fatty liver  It is likely that the passive congestion of the liver from having the increased amount of fat in it has caused some irritation which has led to the discomfort that he is having  Recommend follow-up with gastroenterology (hepatology)  Would recommend ultrasound elastography  Orders:  -     Ambulatory Referral to Hepatology; Future    2  Fatty liver  Assessment & Plan:  Patient had possibility of fatty liver on the CT scan  There were no masses in the liver  Based on this, and the LFTs that were elevated, would recommend ultrasound elastography, and consider follow-up with hepatology  It is likely that the patient does have some passive congestion from the fatty liver, leading to irritation, which caused some of the abdominal discomfort that he was having  Orders:  -     Hepatic function panel; Future  -     Gamma GT; Future  -     US elastography; Future; Expected date: 02/01/2023  -     Ambulatory Referral to Hepatology; Future    3  Primary hypertension  Assessment & Plan:  Patient's blood pressure today was quite good  No change  Continue current medications including Hyzaar, metoprolol  4  Class 1 obesity due to excess calories with serious comorbidity and body mass index (BMI) of 34 0 to 34 9 in adult  Assessment & Plan:  Patient's weight is a bit elevated  It was significantly higher recently versus before  Certainly, it is better than what it was yesterday  Continue to watch  Will follow to see if there are any other changes  5  Anxiety  Assessment & Plan:  Patient does have some anxiety  This is been a longstanding problem for him  Continue on Lexapro without change            COVID 19 Instructions    Du Mock was advised to limit contact with others to essential tasks such as getting food, medications, and medical care  Proper handwashing reviewed, and Hand sanitzer when washing is not available  If the patient develops symptoms of COVID 19, the patient should call the office as soon as possible  For 3840-6443 Flu season, it is strongly recommended that Flu Vaccinations be obtained  Virtual Visits:  Mac: This works on smart phones (any phone with Internet browsing capability)  You should get a text message when the provider is ready to see you  Click on the link in the text message, and the call should start  You will need to type in your name, and allow camera and microphone access  This is HIPPA compliant, and secure  If you have not already done so, get immunized to COVID 19  We are committed to getting you vaccinated as soon as possible and will be closely following CDC and [facility]  guidelines as they are released and revised  Please refer to our COVID-19 vaccine webpage for the most up to date information on the vaccine and our distribution efforts  This site will also have the most up to date recommendations for COVID booster vaccine  Daniel rodríguez    Call [phone] ([phone]), option 7    OUR NEW LOCATION:    [facility]  [address], [address] [place], [place] [address] [address] , [place], [address]  Fax: [phone]    Lab services and OB/GYN are at this location as well

## 2023-02-01 NOTE — ASSESSMENT & PLAN NOTE
Patient's weight is a bit elevated  It was significantly higher recently versus before  Certainly, it is better than what it was yesterday  Continue to watch  Will follow to see if there are any other changes

## 2023-02-01 NOTE — ASSESSMENT & PLAN NOTE
Patient had possibility of fatty liver on the CT scan  There were no masses in the liver  Based on this, and the LFTs that were elevated, would recommend ultrasound elastography, and consider follow-up with hepatology  It is likely that the patient does have some passive congestion from the fatty liver, leading to irritation, which caused some of the abdominal discomfort that he was having

## 2023-02-01 NOTE — PROGRESS NOTES
Name: Tamia Phipps      : 1970      MRN: [id number]  Encounter Provider: Guadalupe Arriaga MD  Encounter Date: 2023   Encounter department: [facility] PRIMARY CARE    Assessment & Plan     1  Right lower quadrant abdominal pain  Assessment & Plan:  Patient was having some abdominal pain yesterday and for a bit before that, but not severe at this point  CT scan did not reveal any specific abnormalities other than fatty liver  It is likely that the passive congestion of the liver from having the increased amount of fat in it has caused some irritation which has led to the discomfort that he is having  Recommend follow-up with gastroenterology (hepatology)  Would recommend ultrasound elastography  Orders:  -     Ambulatory Referral to Hepatology; Future    2  Fatty liver  Assessment & Plan:  Patient had possibility of fatty liver on the CT scan  There were no masses in the liver  Based on this, and the LFTs that were elevated, would recommend ultrasound elastography, and consider follow-up with hepatology  It is likely that the patient does have some passive congestion from the fatty liver, leading to irritation, which caused some of the abdominal discomfort that he was having  Orders:  -     Hepatic function panel; Future  -     Gamma GT; Future  -     US elastography; Future; Expected date: 2023  -     Ambulatory Referral to Hepatology; Future    3  Primary hypertension  Assessment & Plan:  Patient's blood pressure today was quite good  No change  Continue current medications including Hyzaar, metoprolol  4  Class 1 obesity due to excess calories with serious comorbidity and body mass index (BMI) of 34 0 to 34 9 in adult  Assessment & Plan:  Patient's weight is a bit elevated  It was significantly higher recently versus before  Certainly, it is better than what it was yesterday  Continue to watch  Will follow to see if there are any other changes        5  Anxiety  Assessment & Plan:  Patient does have some anxiety  This is been a longstanding problem for him  Continue on Lexapro without change  Subjective      Patient is here today to follow-up after his recent evaluation for possible appendix  Patient reports he is feeling okay today  He was having some abdominal pains the other day  Was not severe  Had some firmness of the abdomen with this as well  Labs and CT was done  Hypertension: Patient is on Hyzaar, metoprolol  Review of Systems    Current Outpatient Medications on File Prior to Visit   Medication Sig   • escitalopram (LEXAPRO) 5 mg tablet TAKE 1 TABLET (5 MG TOTAL) BY MOUTH DAILY  • Krill Oil 1000 MG CAPS krill oil   • latanoprost (XALATAN) 0 005 % ophthalmic solution INSTILL ONE DROP IN BOTH EYES AT BEDTIME   • losartan-hydrochlorothiazide (HYZAAR) 100-25 MG per tablet Take 1 tablet by mouth daily   • metoprolol succinate (TOPROL-XL) 25 mg 24 hr tablet Take 25 mg by mouth daily   • Multiple Vitamins-Minerals (MULTIVITAMIN ADULT EXTRA C PO) one daily   • [DISCONTINUED] brimonidine tartrate 0 2 % ophthalmic solution brimonidine 0 2 % eye drops   PLACE 1 DROP INTO RIGHT EYE TWICE A DAY   • [DISCONTINUED] LORazepam (ATIVAN) 1 mg tablet Take 1 tablet (1 mg total) by mouth daily as needed (severe anxiety)   • Glucosamine Sulfate (SYNOVACIN PO)    • [DISCONTINUED] azithromycin (ZITHROMAX) 250 mg tablet azithromycin 250 mg tablet   TAKE 2 TABLETS BY MOUTH TODAY, THEN TAKE 1 TABLET DAILY FOR 4 DAYS   • [DISCONTINUED] benzonatate (TESSALON) 200 MG capsule benzonatate 200 mg capsule   TAKE 1 CAPSULE BY MOUTH 3 TIMES A DAY AS NEEDED FOR COUGH FOR UP TO 10 DAYS  • [DISCONTINUED] hydrochlorothiazide (HYDRODIURIL) 25 mg tablet hydrochlorothiazide 25 mg tablet   TAKE 1 TABLET (25 MG TOTAL) BY MOUTH DAILY     • [DISCONTINUED] methocarbamol (ROBAXIN) 500 mg tablet Take 1 tablet (500 mg total) by mouth 3 (three) times a day as needed for muscle spasms (Patient not taking: Reported on 12/23/2022)   • [DISCONTINUED] neomycin-polymyxin-dexamethasone (MAXITROL) 0 35%-10,000 units/g-0 1%    • [DISCONTINUED] propranolol (INDERAL) 10 mg tablet TAKE 1 TABLET (10 MG TOTAL) BY MOUTH 2 (TWO) TIMES A DAY AS NEEDED (ANXIETY OR PALPITATIONS) (Patient not taking: Reported on 12/23/2022)       Objective     Laboratory studies reviewed  These were from yesterday  He did have elevated LFTs with this  CT scan done yesterday  Some appearance of fatty liver  No other pathology noted  Normal appendix, no bowel obstruction or inflammation      /86 (BP Location: Right arm, Patient Position: Sitting, Cuff Size: Standard)   Pulse 87   Temp 98 4 °F (36 9 °C) (Tympanic)   Resp 20   Ht 5' 11" (1 803 m)   Wt 111 kg (244 lb)   SpO2 98%   BMI 34 03 kg/m²     Physical Exam  Ayah Berg MD

## 2023-02-01 NOTE — ASSESSMENT & PLAN NOTE
Patient's blood pressure today was quite good  No change  Continue current medications including Hyzaar, metoprolol

## 2023-02-01 NOTE — ASSESSMENT & PLAN NOTE
Patient does have some anxiety  This is been a longstanding problem for him  Continue on Lexapro without change

## 2023-02-01 NOTE — ASSESSMENT & PLAN NOTE
Patient was having some abdominal pain yesterday and for a bit before that, but not severe at this point  CT scan did not reveal any specific abnormalities other than fatty liver  It is likely that the passive congestion of the liver from having the increased amount of fat in it has caused some irritation which has led to the discomfort that he is having  Recommend follow-up with gastroenterology (hepatology)  Would recommend ultrasound elastography

## 2023-02-02 ENCOUNTER — APPOINTMENT (OUTPATIENT)
Dept: LAB | Facility: MEDICAL CENTER | Age: 53
End: 2023-02-02

## 2023-02-02 ENCOUNTER — OFFICE VISIT (OUTPATIENT)
Dept: GASTROENTEROLOGY | Facility: CLINIC | Age: 53
End: 2023-02-02

## 2023-02-02 VITALS
HEIGHT: 71 IN | WEIGHT: 247 LBS | OXYGEN SATURATION: 97 % | HEART RATE: 76 BPM | TEMPERATURE: 98.6 F | SYSTOLIC BLOOD PRESSURE: 118 MMHG | BODY MASS INDEX: 34.58 KG/M2 | DIASTOLIC BLOOD PRESSURE: 74 MMHG

## 2023-02-02 DIAGNOSIS — R00.2 PALPITATIONS: ICD-10-CM

## 2023-02-02 DIAGNOSIS — K76.0 FATTY LIVER: ICD-10-CM

## 2023-02-02 DIAGNOSIS — Z12.5 SCREENING FOR MALIGNANT NEOPLASM OF PROSTATE: ICD-10-CM

## 2023-02-02 DIAGNOSIS — F41.0 PANIC ATTACKS: ICD-10-CM

## 2023-02-02 DIAGNOSIS — I10 PRIMARY HYPERTENSION: ICD-10-CM

## 2023-02-02 DIAGNOSIS — R10.31 RIGHT LOWER QUADRANT ABDOMINAL PAIN: ICD-10-CM

## 2023-02-02 DIAGNOSIS — R74.8 ABNORMAL TRANSAMINASES: Primary | ICD-10-CM

## 2023-02-02 LAB
BILIRUB DIRECT SERPL-MCNC: 0.1 MG/DL (ref 0–0.2)
CHOLEST SERPL-MCNC: 275 MG/DL
GGT SERPL-CCNC: 33 U/L (ref 5–85)
HDLC SERPL-MCNC: 53 MG/DL
LDLC SERPL CALC-MCNC: 181 MG/DL (ref 0–100)
PSA SERPL-MCNC: 0.6 NG/ML (ref 0–4)
TRIGL SERPL-MCNC: 207 MG/DL

## 2023-02-02 NOTE — PROGRESS NOTES
[facility] - Outpatient Consultation  Gavin Alfaro 47 y o  male MRN: [id number]  Encounter: [id number]    PCP:  Tricia Neri MD, [phone]  Referring Provider: Ramandeep Anne MD, [phone]        ASSESSMENT AND PLAN:      Mildly enlarged fatty appearing liver with mild/moderate elevation of transaminase:    I discussed with Mr Anabell Ko the natural history of fatty liver disease, including risks for development, and risk for progression to cirrhosis and its complications  We reviewed that there are no current medications that are FDA approved for the specific management of fatty liver disease  I explained that there are many, many compounds (both new and medications currently used for other indications) that are currently being studied to treat several aspects of fatty liver disease, including decreasing hepatic fat, hepatic inflammation, hepatic fibrosis, as well reducing risk factors for the development of fatty liver (primarily through weight loss)  We do not currently have active clinical trials through my office, but I will notify Mr Anabell Ko when we do if he meets criteria for inclusion  I explained that even if liver enzymes are normal, it does not rule out low levels of active fatty inflammation in the liver  Mr Anabell Ko will have additional blood work done to rule out other causes of chronic liver disease/elevated liver enzymes, including  viral hepatitis, hepatitis A and B immunity testing, autoimmune hepatitis, hemochromatosis and Alpha-1-antitrypsin deficiency    NAFLD Fibrosis Score is a non-invasive calculation based on patient characteristics and basic routine labs (Age, BMI, AST, ALT, Platelet count, Albumin and presence/absence of insulin resistance)  Mr Radha Doty NAFLD Fibrosis score is -1 232  Scores between -1 455 and 0 675 are considered indeterminant and unfortunately are not very helpful for predicting fibrosis stage in NAFLD      I have requested lab based WANG Fibrosure in addition to the already requested abdominal ultrasound elastography with fat quantification (UGAP) to further assess hepatic fibrosis and steatosis in a non-invasive manner  I counseled Mr Eloisa Link on the importance of weight loss through lifestyle modification, primary diet and exercise, and the benefits of seeking input of a dietician/nutritionist as well as consultation with a medical weight loss specialist or bariatric surgery team     Mr Eloisa Link will have the testing done as outlined above and follow-up in 6 months  I will call him if blood work reveals any other underlying causes of chronic liver disease  RLQ Abd Pain:  Etiology not clear  No other associated symptoms  Not associated with PO intake or bowel movement or positional changes  CT abd/pelvis did not show any acute findings  He has colonoscopy planned once his cardiac evaluation is complete  FOLLOW-UP:  Return in about 6 months (around 8/2/2023)  VISIT DIAGNOSES AND ORDERS:      1  Abnormal transaminases    2  Right lower quadrant abdominal pain    3  Fatty liver      Orders Placed This Encounter   Procedures   • AMYNOR Screen w/ Reflex to Titer/Pattern   • Antimitochondrial antibody   • Anti-smooth muscle antibody, IgG   • Hepatitis B surface antibody   • Hepatitis A antibody, total   • Hepatitis panel, acute   • Alpha-1-antitrypsin   • IgG, IgA, IgM   • Ceruloplasmin   • WANG Fibrosure     ______________________________________________________________________    HPI:  Mr Darryl Salazar is a 47 y o  male with medical history as outlined below, including HLD, HTN, abdominal obesity, who comes in today for initial consultation after CT was done for evaluation of RLQ pain  CT showed no acute findings, however did show a mildly enlarged slightly fatty appearing liver  He has no history of elevated LFTs, however his latest blood work showed ALT 94 and AST 60    He states he has no recently started new medication, however since his last blood work, he did start Hyzaar and metoprolol  Mr Melody Pickett denies recent or history of yellow eyes/skin, dark urine, GI bleeding, abdominal distention with fluid, lower extremity swelling, easy bruising, excessive bleeding, pruritus or confusion  He denies nausea, vomiting, heartburn, reflux, difficulty swallowing, early satiety, bloating, diarrhea, constipation or straining with passing stools  He described the episodes of left sided chest discomfort (radiating spreading warmth) and possibly associated lightheadedness and dizziness  He also describes some position/orthostatus dizziness/lightheadedness  He denies a history of heavy alcohol use, drug use or smoking  REVIEW OF SYSTEMS:    Review of Systems   All other systems reviewed and are negative  Historical Information   Patient Active Problem List   Diagnosis   • Allergic rhinitis   • Chronic cluster headache, not intractable   • Erectile dysfunction of non-organic origin   • Hematuria, microscopic   • Hyperlipidemia   • Hypertension   • Intractable pain   • Leukopenia   • Palpitations   • Cervical radiculopathy   • Cervical spondylosis without myelopathy   • Pain in joint involving ankle and foot   • Lateral epicondylitis   • Pain in elbow   • Plantar fasciitis   • Right flank pain   • Strain of gastrocnemius tendon   • Knee joint effusion   • Derangement of medial meniscus   • Rib pain on right side   • Benign prostatic hyperplasia with nocturia   • Sprain of metacarpophalangeal joint   • Anxiety   • Concussion   • Entropion, left   • Colon polyp   • Obesity   • Right lower quadrant abdominal pain   • Fatty liver     Past Medical History:   Diagnosis Date   • Acute prostatitis 7/8/2020   • Benign prostatic hyperplasia without lower urinary tract symptoms    • Chronic prostatitis    • Epididymitis 1/4/2018   • Erectile dysfunction    • Hypertension    • Stress fracture of tibia 4/9/2019    Sees [facility]     • Testicular hypogonadism      Past Surgical History:   Procedure Laterality Date   • CYSTOSCOPY      Diagnostic Bladder   • HERNIA REPAIR     • SURGERY SCROTAL / TESTICULAR      Spermatic Cord for Varicocele- per [employer]     Social History   Social History     Substance and Sexual Activity   Alcohol Use No    Comment: Per [employer]: drinks socially     Social History     Substance and Sexual Activity   Drug Use No     Social History     Tobacco Use   Smoking Status Never   Smokeless Tobacco Never     Family History   Problem Relation Age of Onset   • Breast cancer Mother    • Arthritis Father    • Colon cancer Father    • Coronary artery disease Father    • Nephrolithiasis Father    • Lung cancer Father    • Testicular cancer Father    • Osteoporosis Father    • Cerebral palsy Brother        Meds/Allergies       Current Outpatient Medications:   •  escitalopram (LEXAPRO) 5 mg tablet  •  Glucosamine Sulfate (SYNOVACIN PO)  •  Krill Oil 1000 MG CAPS  •  latanoprost (XALATAN) 0 005 % ophthalmic solution  •  losartan-hydrochlorothiazide (HYZAAR) 100-25 MG per tablet  •  metoprolol succinate (TOPROL-XL) 25 mg 24 hr tablet  •  Multiple Vitamins-Minerals (MULTIVITAMIN ADULT EXTRA C PO)    Allergies   Allergen Reactions   • Penicillins            Objective     Blood pressure 118/74, pulse 76, temperature 98 6 °F (37 °C), temperature source Tympanic, height 5' 11" (1 803 m), weight 112 kg (247 lb), SpO2 97 %  Body mass index is 34 45 kg/m²  PHYSICAL EXAM:           Physical Exam  Vitals reviewed  Constitutional:       General: He is not in acute distress  Appearance: Normal appearance  He is not ill-appearing  HENT:      Head: Normocephalic and atraumatic  Nose: Nose normal       Mouth/Throat:      Pharynx: Oropharynx is clear  No posterior oropharyngeal erythema  Eyes:      General: No scleral icterus  Extraocular Movements: Extraocular movements intact     Cardiovascular:      Rate and Rhythm: Normal rate and regular rhythm  Heart sounds: No murmur heard  Pulmonary:      Effort: Pulmonary effort is normal  No respiratory distress  Breath sounds: Normal breath sounds  No rales  Abdominal:      General: There is distension (no significant TTP)  Palpations: Abdomen is soft  There is no shifting dullness, fluid wave, hepatomegaly or splenomegaly  Tenderness: There is no abdominal tenderness  There is no guarding  Musculoskeletal:         General: No swelling  Normal range of motion  Cervical back: Normal range of motion and neck supple  Skin:     General: Skin is warm  Coloration: Skin is not jaundiced  Neurological:      General: No focal deficit present  Mental Status: He is oriented to person, place, and time     Psychiatric:         Mood and Affect: Mood normal               Lab Results:   Lab Results   Component Value Date    SODIUM 140 01/31/2023    K 4 4 01/31/2023    BUN 21 01/31/2023    CREATININE 0 97 01/31/2023    TBILI 0 59 01/31/2023    ALKPHOS 57 01/31/2023    ALB 4 5 01/31/2023    AST 60 (H) 01/31/2023    ALT 94 (H) 01/31/2023    WBC 6 26 01/31/2023     01/31/2023    HGB 15 1 01/31/2023     No results found for: AFP  No results found for: IRON, FERRITIN, CONCFE, TRANSFERRIN, MAYNOR, SMOOTHMUSCAB, AMAIFA, IGG, IGM, CERULOPLSM, AATPHENOTYPE, HAV, HEPBSAG, HEPBSAB, HEPBCAB, HEPCAB  Lab Results   Component Value Date    HGBA1C 5 6 08/29/2019     NAFLD Fibrosis Score is: -1 232    NAFLD Score Correlated Fibrosis Severity   <-1 455 F0-F2   -1 455-0 676 Indeterminate Score   >0 676 F3-F4   **Fibrosis Severity Scale: F0 = no fibrosis; F1= mild fibrosis; F2 = moderate fibrosis; F3 = severe fibrosis; F4 = cirrhosis    NAFLD Score Component Values:  Component Value Date   Age: 47 y o      BMI: 34 45 kg/m²    IFG or DM: No    AST: 60 U/L 1/31/2023   ALT: 94 U/L 1/31/2023   Platelet: 974 Thousands/uL 1/31/2023   Albumin: 4 5 g/dL 1/31/2023         Radiology Results:   I have reviewed the results of any radiology studies performed within the last 90 days  This includes:      CT abdomen pelvis w contrast    Result Date: 1/31/2023  Narrative: CT ABDOMEN AND PELVIS WITH IV CONTRAST INDICATION:   R10 31: Right lower quadrant pain R14 0: Abdominal distension (gaseous) R63 5: Abnormal weight gain E66 01: Morbid (severe) obesity due to excess calories I10: Essential (primary) hypertension  COMPARISON:  CT of the abdomen and pelvis from 10/6/2020  TECHNIQUE:  CT examination of the abdomen and pelvis was performed  Axial, sagittal, and coronal 2D reformatted images were created from the source data and submitted for interpretation  Radiation dose length product (DLP) for this visit:  900 mGy-cm   This examination, like all CT scans performed in the [facility], was performed utilizing techniques to minimize radiation dose exposure, including the use of iterative reconstruction and automated exposure control  IV Contrast:  100 mL of iohexol (OMNIPAQUE) Enteric Contrast:  Enteric contrast was administered  FINDINGS: ABDOMEN LOWER CHEST: Clear visualized lung bases  No pleural effusions  LIVER/BILIARY TREE:  Normal liver morphology  The liver is mildly enlarged, measuring 19 8 cm in greatest recorder dimension  There is somewhat decreased attenuation of the liver parenchyma, which may indicate hepatic steatosis  There is a subcentimeter  hypodense lesion in the the left hepatic lobe (series 2 image 19), too small to characterize with CT technique, however statistically likely a cyst  No biliary ductal dilation  GALLBLADDER:  No calcified gallstones  No pericholecystic inflammatory change  SPLEEN: Unremarkable  PANCREAS:  Unremarkable  Normal caliber main pancreatic duct  ADRENAL GLANDS:  Unremarkable  KIDNEYS/URETERS:  Symmetric renal enhancement  No intrarenal or ureteral calculi  No hydronephrosis    There is a subcentimeter hypodense lesion in the lower pole the right kidney, too small to accurately characterize with CT technique, however statistically most likely a cyst  STOMACH AND BOWEL:  No bowel obstruction or inflammation  APPENDIX:  Normal appendix  ABDOMINOPELVIC CAVITY:  No ascites or pneumoperitoneum  LYMPH NODES: No abdominal or pelvic lymphadenopathy  VESSELS: Normal caliber aorta  Patent major branch vessels  PELVIS REPRODUCTIVE ORGANS:  The prostate gland is not enlarged  URINARY BLADDER:  There is a diverticulum arising from the right posterolateral wall of the urinary bladder  The urinary bladder is otherwise unremarkable  ABDOMINAL WALL/INGUINAL REGIONS: No ventral abdominal wall hernia  OSSEOUS STRUCTURES:  No focal aggressive osseous lesions  A subcentimeter densely sclerotic lesion is seen in the left acetabulum as in October 2020, statistically likely a bone island  Impression: No acute abdominal or pelvic pathology  Normal appendix  No bowel obstruction or inflammation  Additional findings as above   Workstation performed: [id number]         Tania Enamorado MD  Hepatology/Gastroenterology

## 2023-02-03 ENCOUNTER — TELEPHONE (OUTPATIENT)
Dept: OTHER | Facility: OTHER | Age: 53
End: 2023-02-03

## 2023-02-03 ENCOUNTER — OFFICE VISIT (OUTPATIENT)
Dept: FAMILY MEDICINE CLINIC | Facility: CLINIC | Age: 53
End: 2023-02-03

## 2023-02-03 VITALS
HEIGHT: 71 IN | WEIGHT: 247 LBS | SYSTOLIC BLOOD PRESSURE: 130 MMHG | DIASTOLIC BLOOD PRESSURE: 78 MMHG | TEMPERATURE: 98.7 F | BODY MASS INDEX: 34.58 KG/M2 | HEART RATE: 64 BPM

## 2023-02-03 DIAGNOSIS — E78.2 MIXED HYPERLIPIDEMIA: Primary | ICD-10-CM

## 2023-02-03 DIAGNOSIS — I10 PRIMARY HYPERTENSION: ICD-10-CM

## 2023-02-03 DIAGNOSIS — F41.9 ANXIETY: ICD-10-CM

## 2023-02-03 DIAGNOSIS — M62.830 BACK MUSCLE SPASM: ICD-10-CM

## 2023-02-03 DIAGNOSIS — K76.0 FATTY LIVER: ICD-10-CM

## 2023-02-03 RX ORDER — ESCITALOPRAM OXALATE 5 MG/1
10 TABLET ORAL DAILY
Qty: 180 TABLET | Refills: 1 | Status: SHIPPED | OUTPATIENT
Start: 2023-02-03

## 2023-02-03 NOTE — ASSESSMENT & PLAN NOTE
Patient's cholesterol is definitely elevated again  I would recommend starting on statins  I did discuss with him at length about statins, red rice yeast, Repatha  Reviewed about cholesterols problems with regard to heart disease and causing more issues  Patient's cholesterol has been quite a bit elevated on this recheck  In the past, was not as elevated  Given his multiple risk factors for heart disease, i e : Male, greater than 48, family history, hypertension, hyperlipidemia, I would recommend that he consider treatment  Given that he is currently treating with hepatology for fatty liver, should hold off on starting medications until next visit

## 2023-02-03 NOTE — ASSESSMENT & PLAN NOTE
Patient certainly does have some anxiety  Would recommend Lexapro at 10mg  Will rx 5mg tabs, 2 a day  Discussed about ED

## 2023-02-03 NOTE — PATIENT INSTRUCTIONS
1  Mixed hyperlipidemia  Assessment & Plan:  Patient's cholesterol is definitely elevated again  I would recommend starting on statins  I did discuss with him at length about statins, red rice yeast, Repatha  Reviewed about cholesterols problems with regard to heart disease and causing more issues  Patient's cholesterol has been quite a bit elevated on this recheck  In the past, was not as elevated  Given his multiple risk factors for heart disease, i e : Male, greater than 48, family history, hypertension, hyperlipidemia, I would recommend that he consider treatment  Given that he is currently treating with hepatology for fatty liver, should hold off on starting medications until next visit  2  Anxiety  Assessment & Plan:  Patient certainly does have some anxiety  Would recommend Lexapro at 10mg  Will rx 5mg tabs, 2 a day  Discussed about ED  Orders:  -     escitalopram (LEXAPRO) 5 mg tablet; Take 2 tablets (10 mg total) by mouth daily  -     Testosterone; Future    3  Fatty liver  Assessment & Plan:  Patient is following with hepatology  Laboratory studies pending  4  Primary hypertension  Assessment & Plan:  Blood pressure is doing well  No specific changes  5  Back muscle spasm  Assessment & Plan:  Some spasms in the back in muscles  Recommend heat in AM and Ice in PM             COVID 19 Instructions    Deandra Padron was advised to limit contact with others to essential tasks such as getting food, medications, and medical care  Proper handwashing reviewed, and Hand sanitzer when washing is not available  If the patient develops symptoms of COVID 19, the patient should call the office as soon as possible  For 2205-9183 Flu season, it is strongly recommended that Flu Vaccinations be obtained  Virtual Visits:  Mac: This works on smart phones (any phone with Internet browsing capability)    You should get a text message when the provider is ready to see you   Click on the link in the text message, and the call should start  You will need to type in your name, and allow camera and microphone access  This is HIPPA compliant, and secure  If you have not already done so, get immunized to COVID 19  We are committed to getting you vaccinated as soon as possible and will be closely following CDC and [facility]  guidelines as they are released and revised  Please refer to our COVID-19 vaccine webpage for the most up to date information on the vaccine and our distribution efforts  This site will also have the most up to date recommendations for COVID booster vaccine  Daniel rodríguez    Call [phone] ([phone]), option 7    OUR NEW LOCATION:    [address], [facility] [address] [facility] [place], [address]  Fax: [phone]    Lab services and OB/GYN are at this location as well

## 2023-02-03 NOTE — TELEPHONE ENCOUNTER
Patient would like some nutritional guidance and papers on what he should be eating and be avoiding  Please call and advise

## 2023-02-03 NOTE — PROGRESS NOTES
Name: James Alcaraz      : 1970      MRN: [id number]  Encounter Provider: Ayah Berg MD  Encounter Date: 2/3/2023   Encounter department: [place] [address]  Mixed hyperlipidemia  Assessment & Plan:  Patient's cholesterol is definitely elevated again  I would recommend starting on statins  I did discuss with him at length about statins, red rice yeast, Repatha  Reviewed about cholesterols problems with regard to heart disease and causing more issues  Patient's cholesterol has been quite a bit elevated on this recheck  In the past, was not as elevated  Given his multiple risk factors for heart disease, i e : Male, greater than 48, family history, hypertension, hyperlipidemia, I would recommend that he consider treatment  Given that he is currently treating with hepatology for fatty liver, should hold off on starting medications until next visit  2  Anxiety  Assessment & Plan:  Patient certainly does have some anxiety  Would recommend Lexapro at 10mg  Will rx 5mg tabs, 2 a day  Discussed about ED  Orders:  -     escitalopram (LEXAPRO) 5 mg tablet; Take 2 tablets (10 mg total) by mouth daily  -     Testosterone; Future    3  Fatty liver  Assessment & Plan:  Patient is following with hepatology  Laboratory studies pending  4  Primary hypertension  Assessment & Plan:  Blood pressure is doing well  No specific changes  5  Back muscle spasm  Assessment & Plan:  Some spasms in the back in muscles  Recommend heat in AM and Ice in PM                Subjective      Patient is here to follow-up after recent laboratory studies  Cholesterol was reviewed  Patient is not currently on statins, but he is using Krill oil  Lars Hiss: Patient did follow with hepatology already  Has lab work set up  Ultrasound was also agreed by hepatology  Patient was on doxycycline recently for prostate infection  This was chronic prostatitis    Was on the doxycycline for 14 days  Right flank pain: Patient did talk to GI about this, but there was no specific discussion  Review of Systems   Constitutional: Negative  HENT: Negative  Respiratory: Negative  Musculoskeletal:        Right flank pain       Current Outpatient Medications on File Prior to Visit   Medication Sig   • Glucosamine Sulfate (SYNOVACIN PO)    • Krill Oil 1000 MG CAPS krill oil   • latanoprost (XALATAN) 0 005 % ophthalmic solution INSTILL ONE DROP IN BOTH EYES AT BEDTIME   • losartan-hydrochlorothiazide (HYZAAR) 100-25 MG per tablet Take 1 tablet by mouth daily   • metoprolol succinate (TOPROL-XL) 25 mg 24 hr tablet Take 25 mg by mouth daily   • Multiple Vitamins-Minerals (MULTIVITAMIN ADULT EXTRA C PO) one daily   • [DISCONTINUED] escitalopram (LEXAPRO) 5 mg tablet TAKE 1 TABLET (5 MG TOTAL) BY MOUTH DAILY  • [DISCONTINUED] LORazepam (ATIVAN) 1 mg tablet Take 1 tablet (1 mg total) by mouth daily as needed (severe anxiety)   • [DISCONTINUED] methocarbamol (ROBAXIN) 500 mg tablet Take 1 tablet (500 mg total) by mouth 3 (three) times a day as needed for muscle spasms (Patient not taking: Reported on 12/23/2022)   • [DISCONTINUED] propranolol (INDERAL) 10 mg tablet TAKE 1 TABLET (10 MG TOTAL) BY MOUTH 2 (TWO) TIMES A DAY AS NEEDED (ANXIETY OR PALPITATIONS) (Patient not taking: Reported on 12/23/2022)       Objective     Total cholesterol 275, , HDL 53, triglycerides 207  PSA 0 6     /78 (BP Location: Right arm, Patient Position: Sitting, Cuff Size: Standard)   Pulse 64   Temp 98 7 °F (37 1 °C) (Temporal)   Ht 5' 11" (1 803 m) Comment: on record  Wt 112 kg (247 lb)   BMI 34 45 kg/m²     Physical Exam  Vitals and nursing note reviewed     Musculoskeletal:        Back:        Sandrita Curry MD

## 2023-02-07 ENCOUNTER — TELEPHONE (OUTPATIENT)
Dept: GASTROENTEROLOGY | Facility: CLINIC | Age: 53
End: 2023-02-07

## 2023-02-07 DIAGNOSIS — K76.0 FATTY LIVER: Primary | ICD-10-CM

## 2023-02-07 NOTE — TELEPHONE ENCOUNTER
Called pt with concerns for diet suggestions and states wife looking up diets , he thinks would like to see a nutritionist he really wants it to Deanna Griffin states cholesterol is high too   He wants a referral possibly for the nutritionist

## 2023-02-09 ENCOUNTER — TELEPHONE (OUTPATIENT)
Dept: GASTROENTEROLOGY | Facility: CLINIC | Age: 53
End: 2023-02-09

## 2023-02-14 ENCOUNTER — APPOINTMENT (OUTPATIENT)
Dept: LAB | Facility: MEDICAL CENTER | Age: 53
End: 2023-02-14

## 2023-02-14 DIAGNOSIS — F41.9 ANXIETY: ICD-10-CM

## 2023-02-14 DIAGNOSIS — R10.31 RIGHT LOWER QUADRANT ABDOMINAL PAIN: ICD-10-CM

## 2023-02-14 DIAGNOSIS — K76.0 FATTY LIVER: ICD-10-CM

## 2023-02-14 DIAGNOSIS — R74.8 ABNORMAL TRANSAMINASES: ICD-10-CM

## 2023-02-14 LAB
ANA SER QL IA: NEGATIVE
FERRITIN SERPL-MCNC: 80 NG/ML (ref 8–388)
HAV AB SER QL IA: NORMAL
HAV IGM SER QL: NORMAL
HBV CORE IGM SER QL: NORMAL
HBV SURFACE AB SER-ACNC: <3.1 MIU/ML
HBV SURFACE AG SER QL: NORMAL
HCV AB SER QL: NORMAL
IGA SERPL-MCNC: 64 MG/DL (ref 70–400)
IGG SERPL-MCNC: 987 MG/DL (ref 700–1600)
IGM SERPL-MCNC: 76 MG/DL (ref 40–230)
IRON SATN MFR SERPL: 27 % (ref 20–50)
IRON SERPL-MCNC: 82 UG/DL (ref 65–175)
TESTOST SERPL-MCNC: 147 NG/DL (ref 95–948)
TIBC SERPL-MCNC: 306 UG/DL (ref 250–450)

## 2023-02-15 ENCOUNTER — HOSPITAL ENCOUNTER (OUTPATIENT)
Dept: ULTRASOUND IMAGING | Facility: MEDICAL CENTER | Age: 53
Discharge: HOME | End: 2023-02-15

## 2023-02-15 DIAGNOSIS — K76.0 FATTY LIVER: ICD-10-CM

## 2023-02-15 LAB
A1AT SERPL-MCNC: 118 MG/DL (ref 101–187)
A2 MACROGLOB SERPL-MCNC: 112 MG/DL (ref 110–276)
ACTIN IGG SERPL-ACNC: 9 UNITS (ref 0–19)
ALT SERPL W P-5'-P-CCNC: 28 IU/L (ref 0–55)
APO A-I SERPL-MCNC: 134 MG/DL (ref 101–178)
AST SERPL W P-5'-P-CCNC: 20 IU/L (ref 0–40)
BILIRUB SERPL-MCNC: 0.4 MG/DL (ref 0–1.2)
CERULOPLASMIN SERPL-MCNC: 17.8 MG/DL (ref 16–31)
CHOLEST SERPL-MCNC: 222 MG/DL (ref 100–199)
FIBROSIS SCORING:: ABNORMAL
FIBROSIS STAGE SERPL QL: ABNORMAL
GGT SERPL-CCNC: 16 IU/L (ref 0–65)
GLUCOSE SERPL-MCNC: 99 MG/DL (ref 70–99)
HAPTOGLOB SERPL-MCNC: 69 MG/DL (ref 29–370)
LABORATORY COMMENT REPORT: ABNORMAL
LIVER FIBR SCORE SERPL CALC.FIBROSURE: 0.1 (ref 0–0.21)
MITOCHONDRIA M2 IGG SER-ACNC: <20 UNITS (ref 0–20)
NECROINFLAMMATORY ACT GRADE SERPL QL: ABNORMAL
NECROINFLAMMATORY ACT SCORE SERPL: 0.5
SERVICE CMNT-IMP: ABNORMAL
SL AMB INTERPRETATION: ABNORMAL
SL AMB NASH SCORING: ABNORMAL
SL AMB STEATOSIS GRADE: ABNORMAL
SL AMB STEATOSIS SCORE: 0.56 (ref 0–0.3)
STEATOSIS GRADING: ABNORMAL
TRIGL SERPL-MCNC: 155 MG/DL (ref 0–149)

## 2023-02-16 ENCOUNTER — TELEPHONE (OUTPATIENT)
Dept: GASTROENTEROLOGY | Facility: AMBULARY SURGERY CENTER | Age: 53
End: 2023-02-16

## 2023-02-16 NOTE — TELEPHONE ENCOUNTER
Hx Fatty liver, elevation of transaminase, RLQ abdominal pain    Patient reports pain on right side of back /buttock that radiates to abdomen x 2 weeks. Pain is a pulsating, constant pain up to a 6/10. Sitting or standing does not affect pain level. 2/3/23 office visit with PCP recommended heat in AM and ice in PM  -muscle spasm. Bowels are normal.      Patient recently completed labs and US elastography (reading not avail).   Cardiology work up is in process and he will schedule colonoscopy when completed and cleared.     Recommended Tylenol as needed for pain. Do not exceed 2 grams daily. ED evaluation if pain increases/persistent.

## 2023-02-22 ENCOUNTER — TELEPHONE (OUTPATIENT)
Dept: OTHER | Facility: OTHER | Age: 53
End: 2023-02-22

## 2023-02-22 NOTE — TELEPHONE ENCOUNTER
Patient requesting a call back to discuss test results         Ordering Provider: Dr Earnestine Quiroz  Date Completed: 2/15/2023    Lab []  MRI []  X-Ray []  CT []  Colonoscopy []  EGD []  Biopsy []  Other [x] Liver ultrasound

## 2023-02-22 NOTE — TELEPHONE ENCOUNTER
Dr General Nayak, you can now review results  If you can, please send message to the patient directly    Thank you

## 2023-02-22 NOTE — TELEPHONE ENCOUNTER
I already sent patient a message we are waiting for Radiology to read [address]

## 2023-03-02 ENCOUNTER — TELEPHONE (OUTPATIENT)
Dept: FAMILY MEDICINE CLINIC | Facility: CLINIC | Age: 53
End: 2023-03-02

## 2023-03-02 NOTE — TELEPHONE ENCOUNTER
Patient left voicemail: Hi, my name is Suzy Lira  My birthdate is [id number]  I have a test tomorrow for my heart with the CAT scan with the dye  I know there were some things that came up on my liver ultrasound and stuff and I just want to make sure that I'm able to take that the die or not  So give me a call back at [phone]  Thank you        Please advise if patient can have CT with contrast

## 2023-03-10 ENCOUNTER — OFFICE VISIT (OUTPATIENT)
Dept: FAMILY MEDICINE CLINIC | Facility: CLINIC | Age: 53
End: 2023-03-10

## 2023-03-10 VITALS
HEART RATE: 66 BPM | BODY MASS INDEX: 34.44 KG/M2 | RESPIRATION RATE: 18 BRPM | SYSTOLIC BLOOD PRESSURE: 130 MMHG | WEIGHT: 246 LBS | OXYGEN SATURATION: 96 % | DIASTOLIC BLOOD PRESSURE: 70 MMHG | HEIGHT: 71 IN | TEMPERATURE: 97.3 F

## 2023-03-10 DIAGNOSIS — F41.9 ANXIETY: ICD-10-CM

## 2023-03-10 DIAGNOSIS — K76.0 FATTY LIVER: ICD-10-CM

## 2023-03-10 DIAGNOSIS — E78.2 MIXED HYPERLIPIDEMIA: Primary | ICD-10-CM

## 2023-03-10 DIAGNOSIS — I10 PRIMARY HYPERTENSION: ICD-10-CM

## 2023-03-10 RX ORDER — ATORVASTATIN CALCIUM 20 MG/1
20 TABLET, FILM COATED ORAL DAILY
Qty: 90 TABLET | Refills: 3 | Status: SHIPPED | OUTPATIENT
Start: 2023-03-10

## 2023-03-10 NOTE — ASSESSMENT & PLAN NOTE
Patient did have evaluation with hepatology  No specific changes at the moment  Patient is going to follow in the future

## 2023-03-10 NOTE — PATIENT INSTRUCTIONS
1  Mixed hyperlipidemia  Assessment & Plan:  Cholesterol is quite a bit elevated  Goal for LDL is less than 100, and it currently stands at 181  Recommend statins  Would recommend atorvastatin 40  Could certainly start with 20, and then work-up if necessary  Of note, HDL is reasonable at 53  The goal for this is to decrease cardiac events, i e  heart attack, stroke, death  Orders:  -     atorvastatin (LIPITOR) 20 mg tablet; Take 1 tablet (20 mg total) by mouth daily  -     Comprehensive metabolic panel; Future; Expected date: 09/10/2023  -     Lipid panel; Future; Expected date: 09/10/2023    2  Fatty liver  Assessment & Plan:  Patient did have evaluation with hepatology  No specific changes at the moment  Patient is going to follow in the future  3  Primary hypertension  Assessment & Plan:  Patient does have hypertension  Doing well at the current time with Hyzaar, metoprolol  No change  4  Anxiety  Assessment & Plan:  Lexapro was increased in February, but he is not sure that he got that from the pharmacy  So far, he feels he has been stable with the 5, and will check with the prescription bottle at home to see if it was a larger number of tablets, as the prescription was sent for 5 mg with take 2 as the directions  COVID 19 Instructions    Gracy Yang was advised to limit contact with others to essential tasks such as getting food, medications, and medical care  Proper handwashing reviewed, and Hand sanitzer when washing is not available  If the patient develops symptoms of COVID 19, the patient should call the office as soon as possible  For 1383-5057 Flu season, it is strongly recommended that Flu Vaccinations be obtained  Virtual Visits:  Mac: This works on smart phones (any phone with Internet browsing capability)  You should get a text message when the provider is ready to see you  Click on the link in the text message, and the call should start    You will need to type in your name, and allow camera and microphone access  This is HIPPA compliant, and secure  If you have not already done so, get immunized to COVID 19  We are committed to getting you vaccinated as soon as possible and will be closely following CDC and [facility]  guidelines as they are released and revised  Please refer to our COVID-19 vaccine webpage for the most up to date information on the vaccine and our distribution efforts  This site will also have the most up to date recommendations for COVID booster vaccine  Daniel rodríguez    Call [phone] ([phone]), option 7    OUR NEW LOCATION:    [address], [address] [place], [place] [address] , [place], [address]  Fax: [phone]    Lab services and OB/GYN are at this location as well

## 2023-03-10 NOTE — PROGRESS NOTES
Name: Carolynn Ruelas      : 1970      MRN: [id number]  Encounter Provider: Shanta Warren MD  Encounter Date: 3/10/2023   Encounter department: [place] [address]     1  Mixed hyperlipidemia  Assessment & Plan:  Cholesterol is quite a bit elevated  Goal for LDL is less than 100, and it currently stands at 181  Recommend statins  Would recommend atorvastatin 40  Could certainly start with 20, and then work-up if necessary  Of note, HDL is reasonable at 53  The goal for this is to decrease cardiac events, i e  heart attack, stroke, death  Orders:  -     atorvastatin (LIPITOR) 20 mg tablet; Take 1 tablet (20 mg total) by mouth daily  -     Comprehensive metabolic panel; Future; Expected date: 09/10/2023  -     Lipid panel; Future; Expected date: 09/10/2023    2  Fatty liver  Assessment & Plan:  Patient did have evaluation with hepatology  No specific changes at the moment  Patient is going to follow in the future  3  Primary hypertension  Assessment & Plan:  Patient does have hypertension  Doing well at the current time with Hyzaar, metoprolol  No change  4  Anxiety  Assessment & Plan:  Lexapro was increased in February, but he is not sure that he got that from the pharmacy  So far, he feels he has been stable with the 5, and will check with the prescription bottle at home to see if it was a larger number of tablets, as the prescription was sent for 5 mg with take 2 as the directions  Subjective      Chief Complaint   Patient presents with   • Follow-up     1 month follow up abdominal pain  Here to follow-up on several issues  Did CT scan, which did show a cyst in the liver, as well as the kidney  It also showed a bladder diverticulum  Final read from radiology was that it was a benign CT scan without any specific findings      He did have labs from GI, looking for fibrosis and changes in the liver, of which there really was none     Reviewed laboratory studies from February  Please see copies on the chart  His cholesterol was certainly elevated  Review of Systems    Current Outpatient Medications on File Prior to Visit   Medication Sig   • escitalopram (LEXAPRO) 5 mg tablet Take 2 tablets (10 mg total) by mouth daily   • Glucosamine Sulfate (SYNOVACIN PO)    • Krill Oil 1000 MG CAPS krill oil   • latanoprost (XALATAN) 0 005 % ophthalmic solution INSTILL ONE DROP IN BOTH EYES AT BEDTIME   • losartan-hydrochlorothiazide (HYZAAR) 100-25 MG per tablet Take 1 tablet by mouth daily   • metoprolol succinate (TOPROL-XL) 25 mg 24 hr tablet Take 25 mg by mouth daily   • Multiple Vitamins-Minerals (MULTIVITAMIN ADULT EXTRA C PO) one daily   • [DISCONTINUED] LORazepam (ATIVAN) 1 mg tablet Take 1 tablet (1 mg total) by mouth daily as needed (severe anxiety)   • [DISCONTINUED] methocarbamol (ROBAXIN) 500 mg tablet Take 1 tablet (500 mg total) by mouth 3 (three) times a day as needed for muscle spasms (Patient not taking: Reported on 12/23/2022)   • [DISCONTINUED] propranolol (INDERAL) 10 mg tablet TAKE 1 TABLET (10 MG TOTAL) BY MOUTH 2 (TWO) TIMES A DAY AS NEEDED (ANXIETY OR PALPITATIONS) (Patient not taking: Reported on 12/23/2022)       Objective     Feb labs:   Cholesterol 275, , HDL: 53,   PSA 0 6  GTT: 33  Direct Bili 0 1  MAYNOR negative  Mitochondrial antibody less than 20  Smooth muscle antibody 9, normal     Hep B surface antibody less than 3 1  Hep A negative  Acute hepatitis panel negative  Iron saturation and ferritin were normal   Alpha-1 antitrypsin 118, normal   IgA 64, slightly low    IgG 987, normal   IgM 76,  Normal   Ceruloplasmin 17 8, normal   Bass FibroSure normal   Testosterone 147, normal     /70 (BP Location: Right arm, Patient Position: Sitting, Cuff Size: Large)   Pulse 66   Temp (!) 97 3 °F (36 3 °C) (Tympanic)   Resp 18   Ht 5' 11" (1 803 m)   Wt 112 kg (246 lb)   SpO2 96%   BMI 34 31 kg/m²     Physical Exam  Amna Becker MD

## 2023-03-10 NOTE — ASSESSMENT & PLAN NOTE
Cholesterol is quite a bit elevated  Goal for LDL is less than 100, and it currently stands at 181  Recommend statins  Would recommend atorvastatin 40  Could certainly start with 20, and then work-up if necessary  Of note, HDL is reasonable at 53  The goal for this is to decrease cardiac events, i e  heart attack, stroke, death

## 2023-03-10 NOTE — ASSESSMENT & PLAN NOTE
Lexapro was increased in February, but he is not sure that he got that from the pharmacy  So far, he feels he has been stable with the 5, and will check with the prescription bottle at home to see if it was a larger number of tablets, as the prescription was sent for 5 mg with take 2 as the directions

## 2023-05-18 ENCOUNTER — CLINICAL SUPPORT (OUTPATIENT)
Dept: NUTRITION | Facility: HOSPITAL | Age: 53
End: 2023-05-18
Attending: INTERNAL MEDICINE

## 2023-05-18 VITALS — BODY MASS INDEX: 33.5 KG/M2 | WEIGHT: 240.2 LBS

## 2023-05-18 DIAGNOSIS — K76.0 FATTY LIVER: ICD-10-CM
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
## 2023-06-28 DIAGNOSIS — E29.1 HYPOGONADISM IN MALE: Primary | ICD-10-CM

## 2023-06-29 ENCOUNTER — TELEPHONE (OUTPATIENT)
Dept: UROLOGY | Facility: MEDICAL CENTER | Age: 53
End: 2023-06-29

## 2023-06-29 NOTE — TELEPHONE ENCOUNTER
Called and appointment scheduled  ----- Message from Eula Mitchell RN sent at 6/28/2023 10:48 AM EDT -----  Regarding: FW: Low test  Contact: [phone]    ----- Message -----  From: Amy Dodge PA-C  Sent: 6/28/2023  10:43 AM EDT  To: [facility]  Subject: FW: Low test                                     Please schedule appt with AP for low testosterone discussion    Would repeat testosterone before visit  Orders in  Thanks     ----- Message -----  From: Jaylyn Franco RN  Sent: 6/28/2023   7:58 AM EDT  To: Amy Dodge PA-C  Subject: FW: Low test                                       ----- Message -----  From: Yaritza Alas  Sent: 6/28/2023  12:59 AM EDT  To: [facility]  Subject: Low test                                         Elaina Staffordo been feeling very crappy for a while  My test is now down to 145  Was 360 before would like to come in and  have you check me

## 2023-07-10 ENCOUNTER — APPOINTMENT (OUTPATIENT)
Dept: LAB | Age: 53
End: 2023-07-10
Payer: COMMERCIAL

## 2023-07-10 DIAGNOSIS — E29.1 HYPOGONADISM IN MALE: ICD-10-CM

## 2023-07-10 LAB
BASOPHILS # BLD AUTO: 0.03 THOUSANDS/ÂΜL (ref 0–0.1)
BASOPHILS NFR BLD AUTO: 1 % (ref 0–1)
EOSINOPHIL # BLD AUTO: 0.07 THOUSAND/ÂΜL (ref 0–0.61)
EOSINOPHIL NFR BLD AUTO: 2 % (ref 0–6)
ERYTHROCYTE [DISTWIDTH] IN BLOOD BY AUTOMATED COUNT: 12.2 % (ref 11.6–15.1)
HCT VFR BLD AUTO: 42.1 % (ref 36.5–49.3)
HGB BLD-MCNC: 14.6 G/DL (ref 12–17)
IMM GRANULOCYTES # BLD AUTO: 0.01 THOUSAND/UL (ref 0–0.2)
IMM GRANULOCYTES NFR BLD AUTO: 0 % (ref 0–2)
LYMPHOCYTES # BLD AUTO: 1.51 THOUSANDS/ÂΜL (ref 0.6–4.47)
LYMPHOCYTES NFR BLD AUTO: 36 % (ref 14–44)
MCH RBC QN AUTO: 30.2 PG (ref 26.8–34.3)
MCHC RBC AUTO-ENTMCNC: 34.7 G/DL (ref 31.4–37.4)
MCV RBC AUTO: 87 FL (ref 82–98)
MONOCYTES # BLD AUTO: 0.29 THOUSAND/ÂΜL (ref 0.17–1.22)
MONOCYTES NFR BLD AUTO: 7 % (ref 4–12)
NEUTROPHILS # BLD AUTO: 2.3 THOUSANDS/ÂΜL (ref 1.85–7.62)
NEUTS SEG NFR BLD AUTO: 54 % (ref 43–75)
NRBC BLD AUTO-RTO: 0 /100 WBCS
PLATELET # BLD AUTO: 186 THOUSANDS/UL (ref 149–390)
PMV BLD AUTO: 10.6 FL (ref 8.9–12.7)
RBC # BLD AUTO: 4.83 MILLION/UL (ref 3.88–5.62)
TSH SERPL DL<=0.05 MIU/L-ACNC: 1.57 UIU/ML (ref 0.45–4.5)
WBC # BLD AUTO: 4.21 THOUSAND/UL (ref 4.31–10.16)

## 2023-07-10 PROCEDURE — 85025 COMPLETE CBC W/AUTO DIFF WBC: CPT

## 2023-07-10 PROCEDURE — 84403 ASSAY OF TOTAL TESTOSTERONE: CPT

## 2023-07-10 PROCEDURE — 36415 COLL VENOUS BLD VENIPUNCTURE: CPT

## 2023-07-10 PROCEDURE — 84402 ASSAY OF FREE TESTOSTERONE: CPT

## 2023-07-10 PROCEDURE — 84443 ASSAY THYROID STIM HORMONE: CPT

## 2023-07-11 DIAGNOSIS — E29.1 HYPOGONADISM IN MALE: Primary | ICD-10-CM

## 2023-07-11 LAB
TESTOST FREE SERPL-MCNC: 8.5 PG/ML (ref 7.2–24)
TESTOST SERPL-MCNC: 246 NG/DL (ref 264–916)

## 2023-07-17 ENCOUNTER — OFFICE VISIT (OUTPATIENT)
Dept: UROLOGY | Facility: MEDICAL CENTER | Age: 53
End: 2023-07-17
Payer: COMMERCIAL

## 2023-07-17 VITALS
OXYGEN SATURATION: 97 % | WEIGHT: 242 LBS | HEART RATE: 62 BPM | DIASTOLIC BLOOD PRESSURE: 84 MMHG | HEIGHT: 71 IN | BODY MASS INDEX: 33.88 KG/M2 | SYSTOLIC BLOOD PRESSURE: 120 MMHG

## 2023-07-17 DIAGNOSIS — E29.1 HYPOGONADISM IN MALE: Primary | ICD-10-CM

## 2023-07-17 PROCEDURE — 99213 OFFICE O/P EST LOW 20 MIN: CPT | Performed by: PHYSICIAN ASSISTANT

## 2023-07-17 RX ORDER — METHYLPREDNISOLONE 4 MG/1
TABLET ORAL
COMMUNITY
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
## 2023-07-19 ENCOUNTER — APPOINTMENT (OUTPATIENT)
Dept: LAB | Age: 53
End: 2023-07-19
Payer: COMMERCIAL

## 2023-07-19 DIAGNOSIS — E29.1 HYPOGONADISM IN MALE: ICD-10-CM

## 2023-07-19 LAB
FSH SERPL-ACNC: 8.6 MIU/ML
LH SERPL-ACNC: 5.5 MIU/ML
PROLACTIN SERPL-MCNC: 13.14 NG/ML

## 2023-07-19 PROCEDURE — 84146 ASSAY OF PROLACTIN: CPT

## 2023-07-19 PROCEDURE — 83001 ASSAY OF GONADOTROPIN (FSH): CPT

## 2023-07-19 PROCEDURE — 83002 ASSAY OF GONADOTROPIN (LH): CPT

## 2023-07-19 PROCEDURE — 36415 COLL VENOUS BLD VENIPUNCTURE: CPT

## 2023-07-20 ENCOUNTER — TELEPHONE (OUTPATIENT)
Dept: UROLOGY | Facility: AMBULATORY SURGERY CENTER | Age: 53
End: 2023-07-20

## 2023-07-20 NOTE — TELEPHONE ENCOUNTER
Pt called in to let Beverly Dumont know his testing results just came in. He stated his follow up was pending on the results and that he wanted to get this taken care of before she leaves.

## 2023-08-08 ENCOUNTER — TELEPHONE (OUTPATIENT)
Dept: UROLOGY | Facility: AMBULATORY SURGERY CENTER | Age: 53
End: 2023-08-08

## 2023-08-08 DIAGNOSIS — E29.1 HYPOGONADISM IN MALE: Primary | ICD-10-CM

## 2023-08-08 DIAGNOSIS — R79.89 ELEVATED PROLACTIN LEVEL: ICD-10-CM

## 2023-08-08 NOTE — TELEPHONE ENCOUNTER
Patient called and verbalized that Esau Bautista PA-C was to order blood work, but he does not see any orders in the chart. Please place blood work orders and contact patient so he knows they are placed and can go to the lab.

## 2023-08-10 ENCOUNTER — APPOINTMENT (OUTPATIENT)
Dept: LAB | Age: 53
End: 2023-08-10
Payer: COMMERCIAL

## 2023-08-10 DIAGNOSIS — E29.1 HYPOGONADISM IN MALE: ICD-10-CM

## 2023-08-10 LAB — PROLACTIN SERPL-MCNC: 15.82 NG/ML

## 2023-08-10 PROCEDURE — 84146 ASSAY OF PROLACTIN: CPT

## 2023-08-10 PROCEDURE — 36415 COLL VENOUS BLD VENIPUNCTURE: CPT

## 2023-08-14 NOTE — TELEPHONE ENCOUNTER
Pt called and requesting call back with prolactin results and next plan of care      Pt call [phone]

## 2023-08-16 NOTE — TELEPHONE ENCOUNTER
Call placed to patient. He did not answer. LMOM in detail with the recommendations of the CRNP. Number for central scheduling was provided to the patient to call and arrange for MRI. Office number was provided for patient to call with any questions or concerns.

## 2023-08-16 NOTE — TELEPHONE ENCOUNTER
Repeat Prolactin continues to be elevated. He will need follow up with endocrinology. Referral placed. I will also order MRI of brain.

## 2023-08-24 ENCOUNTER — CONSULT (OUTPATIENT)
Dept: ENDOCRINOLOGY | Facility: CLINIC | Age: 53
End: 2023-08-24
Payer: COMMERCIAL

## 2023-08-24 VITALS
SYSTOLIC BLOOD PRESSURE: 118 MMHG | HEART RATE: 68 BPM | BODY MASS INDEX: 33.54 KG/M2 | DIASTOLIC BLOOD PRESSURE: 80 MMHG | HEIGHT: 71 IN | WEIGHT: 239.6 LBS

## 2023-08-24 DIAGNOSIS — Z87.828 HISTORY OF TRAUMATIC HEAD INJURY: ICD-10-CM

## 2023-08-24 DIAGNOSIS — R79.89 ELEVATED PROLACTIN LEVEL: Primary | ICD-10-CM

## 2023-08-24 DIAGNOSIS — R53.83 FATIGUE, UNSPECIFIED TYPE: ICD-10-CM

## 2023-08-24 DIAGNOSIS — E29.1 HYPOGONADISM IN MALE: ICD-10-CM

## 2023-08-24 PROCEDURE — 99204 OFFICE O/P NEW MOD 45 MIN: CPT | Performed by: STUDENT IN AN ORGANIZED HEALTH CARE EDUCATION/TRAINING PROGRAM
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
## 2023-08-24 NOTE — PATIENT INSTRUCTIONS
- get labs drawn at 8am  - get the MRI.  Take the lorazepam before  - get sleep apnea tested with ENT

## 2023-08-30 ENCOUNTER — HOSPITAL ENCOUNTER (OUTPATIENT)
Facility: MEDICAL CENTER | Age: 53
Discharge: HOME | End: 2023-08-30
Payer: COMMERCIAL

## 2023-08-30 DIAGNOSIS — E29.1 HYPOGONADISM IN MALE: ICD-10-CM

## 2023-08-30 DIAGNOSIS — R79.89 ELEVATED PROLACTIN LEVEL: ICD-10-CM

## 2023-08-30 PROCEDURE — G1004 CDSM NDSC: HCPCS

## 2023-08-30 PROCEDURE — 70553 MRI BRAIN STEM W/O & W/DYE: CPT

## 2023-08-30 PROCEDURE — A9585 GADOBUTROL INJECTION: HCPCS | Performed by: NURSE PRACTITIONER

## 2023-08-30 RX ORDER — GADOBUTROL 604.72 MG/ML
10 INJECTION INTRAVENOUS
Status: COMPLETED | OUTPATIENT
Start: 2023-08-30 | End: 2023-08-30

## 2023-08-30 RX ADMIN — GADOBUTROL 10 ML: 604.72 INJECTION INTRAVENOUS at 10:45

## 2023-09-20 ENCOUNTER — TELEPHONE (OUTPATIENT)
Dept: ENDOCRINOLOGY | Facility: CLINIC | Age: 53
End: 2023-09-20

## 2023-09-28 ENCOUNTER — APPOINTMENT (OUTPATIENT)
Dept: LAB | Age: 53
End: 2023-09-28
Payer: COMMERCIAL

## 2023-09-28 DIAGNOSIS — R53.83 FATIGUE, UNSPECIFIED TYPE: ICD-10-CM

## 2023-09-28 DIAGNOSIS — R79.89 ELEVATED PROLACTIN LEVEL: Primary | ICD-10-CM

## 2023-09-28 DIAGNOSIS — E29.1 HYPOGONADISM IN MALE: ICD-10-CM

## 2023-09-28 DIAGNOSIS — Z87.828 HISTORY OF TRAUMATIC HEAD INJURY: ICD-10-CM

## 2023-09-28 DIAGNOSIS — E78.2 MIXED HYPERLIPIDEMIA: ICD-10-CM

## 2023-09-28 LAB
25(OH)D3 SERPL-MCNC: 26.6 NG/ML (ref 30–100)
ALBUMIN SERPL BCP-MCNC: 4.4 G/DL (ref 3.5–5)
ALP SERPL-CCNC: 50 U/L (ref 34–104)
ALT SERPL W P-5'-P-CCNC: 36 U/L (ref 7–52)
ANION GAP SERPL CALCULATED.3IONS-SCNC: 9 MMOL/L
AST SERPL W P-5'-P-CCNC: 25 U/L (ref 13–39)
BILIRUB SERPL-MCNC: 0.54 MG/DL (ref 0.2–1)
BUN SERPL-MCNC: 19 MG/DL (ref 5–25)
CALCIUM SERPL-MCNC: 9.6 MG/DL (ref 8.4–10.2)
CHLORIDE SERPL-SCNC: 105 MMOL/L (ref 96–108)
CHOLEST SERPL-MCNC: 225 MG/DL
CO2 SERPL-SCNC: 27 MMOL/L (ref 21–32)
CREAT SERPL-MCNC: 1.1 MG/DL (ref 0.6–1.3)
FERRITIN SERPL-MCNC: 63 NG/ML (ref 24–336)
GFR SERPL CREATININE-BSD FRML MDRD: 76 ML/MIN/1.73SQ M
GLUCOSE P FAST SERPL-MCNC: 88 MG/DL (ref 65–99)
HDLC SERPL-MCNC: 40 MG/DL
LDLC SERPL CALC-MCNC: 138 MG/DL (ref 0–100)
NONHDLC SERPL-MCNC: 185 MG/DL
POTASSIUM SERPL-SCNC: 4.3 MMOL/L (ref 3.5–5.3)
PROLACTIN SERPL-MCNC: 12.72 NG/ML
PROT SERPL-MCNC: 7.2 G/DL (ref 6.4–8.4)
SODIUM SERPL-SCNC: 141 MMOL/L (ref 135–147)
T4 FREE SERPL-MCNC: 0.66 NG/DL (ref 0.61–1.12)
TRIGL SERPL-MCNC: 237 MG/DL
TSH SERPL DL<=0.05 MIU/L-ACNC: 1.33 UIU/ML (ref 0.45–4.5)

## 2023-09-28 PROCEDURE — 84146 ASSAY OF PROLACTIN: CPT

## 2023-09-28 PROCEDURE — 82306 VITAMIN D 25 HYDROXY: CPT

## 2023-09-28 PROCEDURE — 84305 ASSAY OF SOMATOMEDIN: CPT

## 2023-09-28 PROCEDURE — 84443 ASSAY THYROID STIM HORMONE: CPT

## 2023-09-28 PROCEDURE — 82728 ASSAY OF FERRITIN: CPT

## 2023-09-28 PROCEDURE — 80053 COMPREHEN METABOLIC PANEL: CPT

## 2023-09-28 PROCEDURE — 84439 ASSAY OF FREE THYROXINE: CPT

## 2023-09-28 PROCEDURE — 80061 LIPID PANEL: CPT

## 2023-09-28 PROCEDURE — 36415 COLL VENOUS BLD VENIPUNCTURE: CPT

## 2023-09-30 LAB — IGF-I SERPL-MCNC: 197 NG/ML (ref 74–255)

## 2023-10-09 ENCOUNTER — TELEPHONE (OUTPATIENT)
Dept: NEUROLOGY | Facility: CLINIC | Age: 53
End: 2023-10-09

## 2023-10-09 NOTE — TELEPHONE ENCOUNTER
Spoke with patient to confirm his appointment for 10/17/23 at 1:30 pm in our [facility] location, and he confirmed

## 2023-10-17 ENCOUNTER — OFFICE VISIT (OUTPATIENT)
Dept: NEUROLOGY | Facility: CLINIC | Age: 53
End: 2023-10-17
Payer: COMMERCIAL

## 2023-10-17 VITALS
SYSTOLIC BLOOD PRESSURE: 120 MMHG | WEIGHT: 241.6 LBS | BODY MASS INDEX: 33.82 KG/M2 | HEIGHT: 71 IN | HEART RATE: 68 BPM | DIASTOLIC BLOOD PRESSURE: 70 MMHG

## 2023-10-17 DIAGNOSIS — R51.9 CHRONIC DAILY HEADACHE: ICD-10-CM

## 2023-10-17 DIAGNOSIS — R51.9 DAILY HEADACHE: ICD-10-CM

## 2023-10-17 DIAGNOSIS — G47.19 EXCESSIVE DAYTIME SLEEPINESS: ICD-10-CM

## 2023-10-17 DIAGNOSIS — G44.309 POST-TRAUMATIC HEADACHE: Primary | ICD-10-CM

## 2023-10-17 PROCEDURE — 99213 OFFICE O/P EST LOW 20 MIN: CPT | Performed by: STUDENT IN AN ORGANIZED HEALTH CARE EDUCATION/TRAINING PROGRAM

## 2023-10-17 NOTE — PATIENT INSTRUCTIONS
Patient instructions     Please schedule your sleep study for possible JORGE LUIS    Headache/migraine treatment:   Abortive medications (for immediate treatment of a headache): It is ok to take ibuprofen, acetaminophen or naproxen (Advil, Tylenol,  Aleve, Excedrin) if they help your headaches you should limit these to No more than 3 times a week to avoid medication overuse/rebound headaches. Over the counter preventive supplements for headaches/migraines (if you try, try for 3 months straight)  (to take every day to help prevent headaches - not to take at the time of headache): There are combo pills online of these - none of which regulated by FDA and double check dosing - take appropriate dose only once a day- preventa migraine, migravent, mind ease, migrelief   [x] Magnesium 400mg daily (If any diarrhea or upset stomach, decrease dose  as tolerated)  [x] Riboflavin (Vitamin B2) 400mg daily - try online   (FYI B2 may make your urine bright/neon yellow)  AND/OR  [] Herbal medication: Petasites/Butterbur 150 mg daily - try online  (When choosing your Butterbur online or in the store, beware that there are some poor preps containing pyrrolizidine alkaloids (PAs) that can be harmful to the liver. Therefore, do not use butterbur products that are not labeled as PA-free.)    Sleep and headache prevention:   [] Melatonin - you may take 3 mg nightly for sleep. You should take this 1 hour prior to bedtime consistently every night for it to work. It works by gradually helping to adjust your sleep time over days to weeks, rather than immediately making you feel sleepy. Lifestyle Recommendations:  [x] SLEEP - Maintain a regular sleep schedule: Adults need at least 7-8 hours of uninterrupted a night.  Maintain good sleep hygiene:  Going to bed and waking up at consistent times, avoiding excessive daytime naps, avoiding caffeinated beverages in the evening, avoid excessive stimulation in the evening and generally using bed primarily for sleeping. One hour before bedtime would recommend turning lights down lower, decreasing your activity (may read quietly, listen to music at a low volume). When you get into bed, should eliminate all technology (no texting, emailing, playing with your phone, iPad or tablet in bed). [x] HYDRATION - Maintain good hydration. Drink  2L of fluid a day (4 typical small water bottles)  [x] DIET - Maintain good nutrition. In particular don't skip meals and try and eat healthy balanced meals regularly. [x] TRIGGERS - Look for other triggers and avoid them: Limit caffeine to 1-2 cups a day or less. Avoid dietary triggers that you have noticed bring on your headaches (this could include aged cheese, peanuts, MSG, aspartame and nitrates). [x] EXERCISE - physical exercise as we all know is good for you in many ways, and not only is good for your heart, but also is beneficial for your mental health, cognitive health and  chronic pain/headaches. I would encourage at the least 5 days of physical exercise weekly for at least 30 minutes. Education and Follow-up  [x] Please call with any questions or concerns. Of course if any new concerning symptoms go to the emergency department.   [x] Follow up in 1 year
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
## 2023-10-23 DIAGNOSIS — I10 BENIGN ESSENTIAL HYPERTENSION: ICD-10-CM

## 2023-10-23 RX ORDER — LOSARTAN POTASSIUM AND HYDROCHLOROTHIAZIDE 25; 100 MG/1; MG/1
1 TABLET ORAL DAILY
Qty: 90 TABLET | Refills: 3 | Status: SHIPPED | OUTPATIENT
Start: 2023-10-23

## 2023-10-29 DIAGNOSIS — F41.9 ANXIETY: ICD-10-CM

## 2023-10-30 RX ORDER — ESCITALOPRAM OXALATE 5 MG/1
10 TABLET ORAL DAILY
Qty: 180 TABLET | Refills: 0 | Status: SHIPPED | OUTPATIENT
Start: 2023-10-30

## 2023-11-03 ENCOUNTER — TELEPHONE (OUTPATIENT)
Dept: ENDOCRINOLOGY | Facility: CLINIC | Age: 53
End: 2023-11-03

## 2023-11-06 ENCOUNTER — APPOINTMENT (OUTPATIENT)
Dept: LAB | Age: 53
End: 2023-11-06
Payer: COMMERCIAL

## 2023-11-06 DIAGNOSIS — E29.1 HYPOGONADISM IN MALE: ICD-10-CM

## 2023-11-06 DIAGNOSIS — Z87.828 HISTORY OF INJURY: ICD-10-CM

## 2023-11-06 DIAGNOSIS — R53.83 FATIGUE, UNSPECIFIED TYPE: ICD-10-CM

## 2023-11-06 LAB — CORTIS AM PEAK SERPL-MCNC: 12.5 UG/DL (ref 6.7–22.6)

## 2023-11-06 PROCEDURE — 36415 COLL VENOUS BLD VENIPUNCTURE: CPT

## 2023-11-06 PROCEDURE — 84403 ASSAY OF TOTAL TESTOSTERONE: CPT

## 2023-11-06 PROCEDURE — 82533 TOTAL CORTISOL: CPT

## 2023-11-06 PROCEDURE — 84402 ASSAY OF FREE TESTOSTERONE: CPT

## 2023-11-07 LAB
TESTOST FREE SERPL-MCNC: 8.4 PG/ML (ref 7.2–24)
TESTOST SERPL-MCNC: 391 NG/DL (ref 264–916)

## 2023-11-16 ENCOUNTER — OFFICE VISIT (OUTPATIENT)
Dept: ENDOCRINOLOGY | Facility: CLINIC | Age: 53
End: 2023-11-16
Payer: COMMERCIAL

## 2023-11-16 VITALS
DIASTOLIC BLOOD PRESSURE: 80 MMHG | WEIGHT: 242.4 LBS | HEART RATE: 75 BPM | HEIGHT: 71 IN | SYSTOLIC BLOOD PRESSURE: 136 MMHG | BODY MASS INDEX: 33.94 KG/M2

## 2023-11-16 DIAGNOSIS — E55.9 VITAMIN D DEFICIENCY: ICD-10-CM

## 2023-11-16 DIAGNOSIS — F52.21 ERECTILE DYSFUNCTION OF NON-ORGANIC ORIGIN: ICD-10-CM

## 2023-11-16 DIAGNOSIS — Z87.828 HISTORY OF TRAUMATIC HEAD INJURY: ICD-10-CM

## 2023-11-16 DIAGNOSIS — G47.30 SLEEP APNEA, UNSPECIFIED TYPE: ICD-10-CM

## 2023-11-16 DIAGNOSIS — R53.83 FATIGUE, UNSPECIFIED TYPE: Primary | ICD-10-CM

## 2023-11-16 PROCEDURE — 99214 OFFICE O/P EST MOD 30 MIN: CPT | Performed by: STUDENT IN AN ORGANIZED HEALTH CARE EDUCATION/TRAINING PROGRAM

## 2023-11-16 RX ORDER — ERGOCALCIFEROL 1.25 MG/1
50000 CAPSULE ORAL WEEKLY
Qty: 20 CAPSULE | Refills: 2 | Status: SHIPPED | OUTPATIENT
Start: 2023-11-16

## 2023-11-16 NOTE — PATIENT INSTRUCTIONS
--take 5000u vitamin D daily  --get that sleep study!   --the pituitary hormones are not contributing to how you're feeling

## 2023-12-26 ENCOUNTER — TELEPHONE (OUTPATIENT)
Dept: NEUROLOGY | Facility: CLINIC | Age: 53
End: 2023-12-26

## 2024-01-02 ENCOUNTER — EVALUATION (OUTPATIENT)
Dept: PHYSICAL THERAPY | Facility: CLINIC | Age: 54
End: 2024-01-02
Payer: COMMERCIAL

## 2024-01-02 DIAGNOSIS — S83.207D TEAR OF LEFT MENISCUS AS CURRENT INJURY, SUBSEQUENT ENCOUNTER: Primary | ICD-10-CM

## 2024-01-02 PROCEDURE — 97112 NEUROMUSCULAR REEDUCATION: CPT | Performed by: PHYSICAL THERAPIST

## 2024-01-02 PROCEDURE — 97162 PT EVAL MOD COMPLEX 30 MIN: CPT | Performed by: PHYSICAL THERAPIST

## 2024-01-02 PROCEDURE — 97110 THERAPEUTIC EXERCISES: CPT | Performed by: PHYSICAL THERAPIST

## 2024-01-02 NOTE — PROGRESS NOTES
PT Evaluation     Today's date: 2024  Patient name: Santos Santos  : 1970  MRN: [id number]  Referring provider: Nicholas Patrick*  Dx:   Encounter Diagnosis     ICD-10-CM    1. Tear of left meniscus as current injury, subsequent encounter  S83.207D                      Assessment  Assessment details: Santos Santos is a 53 y.o. year old male presenting to PT with pain, decreased range of motion, decreased strength, and decreased tolerance to activity. Signs and symptoms are consistent with referring diagnosis of L medial menisectomy.  The existing impairments result in difficulty with end range knee flexion, walking and functional mobility. Santos would benefit from skilled PT services to address these issues and to maximize function.  Home exercise provided and all questions answered. Thank you for the referral.    Impairments: abnormal or restricted ROM, activity intolerance and pain with function    Goals  STG 2-4 weeks  Achieve full knee ext and 120 deg left knee flexion  Decrease pain to <5/10 with activity  Increase standing/walking tolerance to >30 minutes  Patient independent with HEP    LTG 6-8 weeks  Increase BLE strength 20#  Decrease pain to <2/10 with activity  Increase single leg stance >30 seconds  Increase standing/walking tolerance to >90 minutes  Return to all work and recreation without activity    Plan  Planned therapy interventions: joint mobilization, neuromuscular re-education, strengthening, stretching, therapeutic activities and therapeutic exercise  Frequency: 2x week  Duration in weeks: 6    Subjective Evaluation    History of Present Illness  Mechanism of injury: Santos reports insidious onset of L medial meniscus tear that has been bothering him for 9-12 months.  He underwent knee scope last week and has been consistent with HEP.      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 He is  also very active and plans to return to the gym as well as various outdoor activities.   Patient Goals  Patient goals for therapy: decreased pain, increased motion, improved balance, return to sport/leisure activities, return to work, independence with ADLs/IADLs and increased strength    Pain  Current pain rating: 3  At best pain rating: 3  At worst pain rating: 3  Quality: tight and pressure  Relieving factors: support  Aggravating factors: walking, standing and running  Progression: no change    Treatments  Current treatment: physical therapy    Objective     Tenderness   Left Knee   Tenderness in the inferior patella, quadriceps tendon and superior patella.     Passive Range of Motion   Left Knee   Flexion: 105 degrees   Extension: 0 degrees     Strength/Myotome Testing     Left Knee   Flexion: 4  Extension: 4    Swelling     Left Knee Girth Measurement (cm)   Joint line: 43.8 cm    Right Knee Girth Measurement (cm)   Joint line: 42.8 cm           Precautions: Menisectomy      Manuals 1/2            Patella mobs prn                                                   Neuro Re-Ed             Heel slides HEP            Ankle pump HEP            Quad Sets nv            SLR nv            Func HR nv                                      Ther Ex             Bike 10'                                                                                                       Ther Activity                                       Gait Training                                       Modalities

## 2024-01-04 ENCOUNTER — OFFICE VISIT (OUTPATIENT)
Dept: PHYSICAL THERAPY | Facility: CLINIC | Age: 54
End: 2024-01-04
Payer: COMMERCIAL

## 2024-01-04 DIAGNOSIS — S83.207D TEAR OF LEFT MENISCUS AS CURRENT INJURY, SUBSEQUENT ENCOUNTER: Primary | ICD-10-CM

## 2024-01-04 PROCEDURE — 97140 MANUAL THERAPY 1/> REGIONS: CPT | Performed by: PHYSICAL THERAPIST

## 2024-01-04 PROCEDURE — 97110 THERAPEUTIC EXERCISES: CPT | Performed by: PHYSICAL THERAPIST

## 2024-01-04 PROCEDURE — 97112 NEUROMUSCULAR REEDUCATION: CPT | Performed by: PHYSICAL THERAPIST

## 2024-01-04 NOTE — PROGRESS NOTES
"Daily Note     Today's date: 2024  Patient name: Santos Santos  : 1970  MRN: [id number]  Referring provider: Nicholas Patrick*  Dx:   Encounter Diagnosis     ICD-10-CM    1. Tear of left meniscus as current injury, subsequent encounter  S83.207D                      Subjective: Reports he continues to have medial and posterior knee pain.  Overall he feels like he is improving since surgery      Objective: See treatment diary below      Assessment: Tolerated treatment well. Patient would benefit from continued PT.  Patient has severe hypersensitivity and pain over the posterior medial and superior lateral knee.      Plan: Continue per plan of care.      Precautions: none      Manuals                          STM to posterior medial knee joint ND                                      Neuro Re-Ed                          Quad sets 10x10\"                                                                             Ther Ex             Bike  5, had discomfort            Heel slides  10x10\"            Seated gastroc stretch with strap 5x30\"                                                                               Ther Activity                                       Gait Training                                         Modalities                                            "

## 2024-01-09 ENCOUNTER — APPOINTMENT (OUTPATIENT)
Dept: PHYSICAL THERAPY | Facility: CLINIC | Age: 54
End: 2024-01-09
Payer: COMMERCIAL

## 2024-01-12 ENCOUNTER — OFFICE VISIT (OUTPATIENT)
Dept: PHYSICAL THERAPY | Facility: CLINIC | Age: 54
End: 2024-01-12
Payer: COMMERCIAL

## 2024-01-12 DIAGNOSIS — S83.207D TEAR OF LEFT MENISCUS AS CURRENT INJURY, SUBSEQUENT ENCOUNTER: Primary | ICD-10-CM

## 2024-01-12 PROCEDURE — 97140 MANUAL THERAPY 1/> REGIONS: CPT | Performed by: PHYSICAL THERAPIST

## 2024-01-12 PROCEDURE — 97110 THERAPEUTIC EXERCISES: CPT | Performed by: PHYSICAL THERAPIST

## 2024-01-12 PROCEDURE — 97112 NEUROMUSCULAR REEDUCATION: CPT | Performed by: PHYSICAL THERAPIST

## 2024-01-12 NOTE — PROGRESS NOTES
"Daily Note     Today's date: 2024  Patient name: Santos Santos  : 1970  MRN: [id number]  Referring provider: Nicholas Patrick*  Dx:   Encounter Diagnosis     ICD-10-CM    1. Tear of left meniscus as current injury, subsequent encounter  S83.207D                      Subjective: Reports he continues to have medial and posterior knee pain.        Objective: See treatment diary below      Assessment: Tolerated treatment well. Patient would benefit from continued PT.  Patient has severe hypersensitivity and pain over the posterior medial and superior lateral knee.      Plan: Continue per plan of care.      Precautions: none      Manuals                          STM to posterior medial knee joint ND                                      Neuro Re-Ed                          Quad sets 10x10\"                                                                             Ther Ex             Bike  10'            Heel slides  10x10\" home            Seated gastroc stretch with strap 5x30\" home            LAQ 20x            VG DLS  50% 30x              Standing knee flexion stretch with foot on chair 10x10\" each                                      Ther Activity                                       Gait Training                                         Modalities                                            "

## 2024-01-15 ENCOUNTER — OFFICE VISIT (OUTPATIENT)
Dept: PHYSICAL THERAPY | Facility: CLINIC | Age: 54
End: 2024-01-15
Payer: COMMERCIAL

## 2024-01-15 DIAGNOSIS — S83.207D TEAR OF LEFT MENISCUS AS CURRENT INJURY, SUBSEQUENT ENCOUNTER: Primary | ICD-10-CM

## 2024-01-15 PROCEDURE — 97112 NEUROMUSCULAR REEDUCATION: CPT | Performed by: PHYSICAL THERAPIST

## 2024-01-15 PROCEDURE — 97140 MANUAL THERAPY 1/> REGIONS: CPT | Performed by: PHYSICAL THERAPIST

## 2024-01-15 PROCEDURE — 97110 THERAPEUTIC EXERCISES: CPT | Performed by: PHYSICAL THERAPIST

## 2024-01-15 NOTE — PROGRESS NOTES
"Daily Note     Today's date: 1/15/2024  Patient name: Santos Santos  : 1970  MRN: [id number]  Referring provider: Nicholas Patrick*  Dx:   Encounter Diagnosis     ICD-10-CM    1. Tear of left meniscus as current injury, subsequent encounter  S83.207D                      Subjective: Reports he is doing better overall      Objective: See treatment diary below      Assessment: Tolerated treatment well. Patient would benefit from continued PT.  Patient has severe hypersensitivity and pain over the posterior medial and superior lateral knee.      Plan: Continue per plan of care.      Precautions: none      Manuals 1/15                         STM to posterior medial knee joint ND                                      Neuro Re-Ed                          Quad sets 10x10\"                                                                             Ther Ex             Bike  10'                                      LAQ 20x            VG DLS  50% 30x              Standing knee flexion stretch with foot on chair 10x10\" each            Functional heel raise 10x5\" each                         Ther Activity                                       Gait Training                                         Modalities                                            "

## 2024-01-17 ENCOUNTER — APPOINTMENT (OUTPATIENT)
Dept: PHYSICAL THERAPY | Facility: CLINIC | Age: 54
End: 2024-01-17
Payer: COMMERCIAL

## 2024-01-22 ENCOUNTER — OFFICE VISIT (OUTPATIENT)
Dept: PHYSICAL THERAPY | Facility: CLINIC | Age: 54
End: 2024-01-22
Payer: COMMERCIAL

## 2024-01-22 DIAGNOSIS — S83.207D TEAR OF LEFT MENISCUS AS CURRENT INJURY, SUBSEQUENT ENCOUNTER: Primary | ICD-10-CM

## 2024-01-22 PROCEDURE — 97112 NEUROMUSCULAR REEDUCATION: CPT | Performed by: PHYSICAL THERAPIST

## 2024-01-22 PROCEDURE — 97140 MANUAL THERAPY 1/> REGIONS: CPT | Performed by: PHYSICAL THERAPIST

## 2024-01-22 PROCEDURE — 97110 THERAPEUTIC EXERCISES: CPT | Performed by: PHYSICAL THERAPIST

## 2024-01-22 NOTE — PROGRESS NOTES
"Daily Note     Today's date: 2024  Patient name: Santos Santos  : 1970  MRN: [id number]  Referring provider: Nicholas Patrick*  Dx:   Encounter Diagnosis     ICD-10-CM    1. Tear of left meniscus as current injury, subsequent encounter  S83.207D                      Subjective: Reports he has continued inferior patellar pain.      Objective: See treatment diary below      Assessment: Tolerated treatment well. Patient would benefit from continued PT.  Patient has severe hypersensitivity and pain over the posterior medial and superior lateral knee.      Plan: Continue per plan of care.      Precautions: none      Manuals                          STM to posterior medial knee joint ND                                      Neuro Re-Ed                          Quad sets with SLR 20x                                                                             Ther Ex             Bike  10'                                      LAQ 20x            VG DLS  50% 30x              Standing knee flexion stretch with foot on chair 10x10\" each            Functional heel raise 10x5\" each                         Ther Activity                                       Gait Training                                         Modalities                                            "

## 2024-01-28 DIAGNOSIS — F41.9 ANXIETY: ICD-10-CM

## 2024-01-29 ENCOUNTER — APPOINTMENT (OUTPATIENT)
Dept: PHYSICAL THERAPY | Facility: CLINIC | Age: 54
End: 2024-01-29
Payer: COMMERCIAL

## 2024-01-29 RX ORDER — ESCITALOPRAM OXALATE 5 MG/1
10 TABLET ORAL DAILY
Qty: 180 TABLET | Refills: 1 | Status: SHIPPED | OUTPATIENT
Start: 2024-01-29

## 2024-01-31 ENCOUNTER — APPOINTMENT (OUTPATIENT)
Dept: PHYSICAL THERAPY | Facility: CLINIC | Age: 54
End: 2024-01-31
Payer: COMMERCIAL

## 2024-03-01 ENCOUNTER — OFFICE VISIT (OUTPATIENT)
Dept: FAMILY MEDICINE CLINIC | Facility: CLINIC | Age: 54
End: 2024-03-01
Payer: COMMERCIAL

## 2024-03-01 VITALS
DIASTOLIC BLOOD PRESSURE: 78 MMHG | TEMPERATURE: 96.8 F | OXYGEN SATURATION: 97 % | SYSTOLIC BLOOD PRESSURE: 146 MMHG | HEART RATE: 73 BPM | WEIGHT: 258.13 LBS | BODY MASS INDEX: 36 KG/M2

## 2024-03-01 DIAGNOSIS — Z12.11 SCREEN FOR COLON CANCER: ICD-10-CM

## 2024-03-01 DIAGNOSIS — I10 PRIMARY HYPERTENSION: ICD-10-CM

## 2024-03-01 DIAGNOSIS — H65.01 NON-RECURRENT ACUTE SEROUS OTITIS MEDIA OF RIGHT EAR: Primary | ICD-10-CM

## 2024-03-01 PROCEDURE — 99213 OFFICE O/P EST LOW 20 MIN: CPT | Performed by: FAMILY MEDICINE

## 2024-03-01 RX ORDER — AZITHROMYCIN 250 MG/1
TABLET, FILM COATED ORAL
Qty: 6 TABLET | Refills: 0 | Status: SHIPPED | OUTPATIENT
Start: 2024-03-01 | End: 2024-03-06

## 2024-03-01 NOTE — PROGRESS NOTES
Name: Santos Santos      : 1970      MRN: [id number]  Encounter Provider: Steven Quinones MD  Encounter Date: 3/1/2024   Encounter department: [facility] PRIMARY CARE    Assessment & Plan     1. Non-recurrent acute serous otitis media of right ear  Comments:  Appears to have right otitis media.  This may result in some of the changes in his voice as well.  Upper respiratory.  Z-Martín.  Orders:  -     azithromycin (ZITHROMAX) 250 mg tablet; Take 2 tablets on day 1, then 1 tablet daily days 2 through 5    2. Screen for colon cancer  -     Ambulatory Referral to Gastroenterology; Future    3. Primary hypertension  Assessment & Plan:  Patient does have slight elevation in blood pressure today.  Given current illness, would not make any adjustments.             Subjective      Chief Complaint   Patient presents with   • Cough   • Nasal Congestion   • Sore Throat     Pt lost his voice and states his throat is more scratchy than sore.  mgb       Minimal sore throat.  Some tingling and then coughing.  No tooth or face pain with this.  Minimal postnasal drip.  No heartburn.  Started about 3 days ago, has not really gotten any better.    Currently using warm drinks, like tea, but no real improvement.    Patient did have knee surgery recently.  Feels he is doing much better with that.      Review of Systems   Constitutional: Negative.    HENT:  Positive for congestion and rhinorrhea. Negative for postnasal drip, sinus pressure, sinus pain and sore throat.    Eyes: Negative.    Respiratory: Negative.     Cardiovascular: Negative.    Gastrointestinal: Negative.    Endocrine: Negative.    Genitourinary: Negative.    Musculoskeletal: Negative.    Skin: Negative.    Allergic/Immunologic: Negative.    Neurological: Negative.    Hematological: Negative.    Psychiatric/Behavioral: Negative.         Current Outpatient Medications on File Prior to Visit   Medication Sig   • ergocalciferol (VITAMIN D2) 50,000  units Take 1 capsule (50,000 Units total) by mouth once a week   • escitalopram (LEXAPRO) 5 mg tablet TAKE 2 TABLETS BY MOUTH EVERY DAY   • Krill Oil 1000 MG CAPS krill oil   • losartan-hydrochlorothiazide (HYZAAR) 100-25 MG per tablet TAKE 1 TABLET BY MOUTH EVERY DAY   • metoprolol succinate (TOPROL-XL) 25 mg 24 hr tablet Take 25 mg by mouth daily   • Multiple Vitamins-Minerals (MULTIVITAMIN ADULT EXTRA C PO) one daily   • [DISCONTINUED] LORazepam (ATIVAN) 1 mg tablet Take 1 tablet (1 mg total) by mouth daily as needed (severe anxiety)   • [DISCONTINUED] methocarbamol (ROBAXIN) 500 mg tablet Take 1 tablet (500 mg total) by mouth 3 (three) times a day as needed for muscle spasms (Patient not taking: Reported on 12/23/2022)   • [DISCONTINUED] propranolol (INDERAL) 10 mg tablet TAKE 1 TABLET (10 MG TOTAL) BY MOUTH 2 (TWO) TIMES A DAY AS NEEDED (ANXIETY OR PALPITATIONS) (Patient not taking: Reported on 12/23/2022)       Objective     /78 (BP Location: Right arm, Patient Position: Sitting, Cuff Size: Large)   Pulse 73   Temp (!) 96.8 °F (36 °C) (Temporal)   Wt 117 kg (258 lb 2 oz)   SpO2 97%   BMI 36.00 kg/m²     Physical Exam  Vitals and nursing note reviewed.   Constitutional:       Appearance: Normal appearance. He is well-developed.   HENT:      Head: Normocephalic and atraumatic.      Right Ear: Tympanic membrane is erythematous and bulging.      Left Ear: Tympanic membrane is bulging.   Cardiovascular:      Rate and Rhythm: Normal rate and regular rhythm.      Pulses:           Carotid pulses are 2+ on the right side and 2+ on the left side.     Heart sounds: Normal heart sounds. No murmur heard.     No friction rub. No gallop.   Pulmonary:      Effort: Pulmonary effort is normal. No respiratory distress.      Breath sounds: Normal breath sounds. No wheezing or rales.   Musculoskeletal:      Cervical back: Normal range of motion and neck supple.   Neurological:      Mental Status: He is alert.        Steven Quinones MD

## 2024-03-01 NOTE — PATIENT INSTRUCTIONS
1. Non-recurrent acute serous otitis media of right ear  Comments:  Appears to have right otitis media.  This may result in some of the changes in his voice as well.  Upper respiratory.  Z-Martín.  Orders:  -     azithromycin (ZITHROMAX) 250 mg tablet; Take 2 tablets on day 1, then 1 tablet daily days 2 through 5    2. Screen for colon cancer  -     Ambulatory Referral to Gastroenterology; Future    3. Primary hypertension  Assessment & Plan:  Patient does have slight elevation in blood pressure today.  Given current illness, would not make any adjustments.          COVID 19 Instructions    Santos Santos was advised to limit contact with others to essential tasks such as getting food, medications, and medical care.    Proper handwashing reviewed, and Hand sanitzer when washing is not available.    If the patient develops symptoms of COVID 19, the patient should call the office as soon as possible.    It is strongly recommended that Flu Vaccinations be obtained.      Virtual Visits:  Mac: This works on smart phones (any phone with Internet browsing capability).  You should get a text message when the provider is ready to see you.  Click on the link in the text message, and the call should start.  You will need to type in your name, and allow camera and microphone access.  This is HIPPA compliant, and secure.      If you have not already done so, get immunized to COVID 19.      We are committed to getting you vaccinated as soon as possible and will be closely following CDC and [facility] guidelines as they are released and revised.  Please refer to our COVID-19 vaccine webpage for the most up to date information on the vaccine and our distribution efforts.    This site will also have the most up to date recommendations for COVID booster vaccine.    [email or web address]://[email or web address]-vaccine    Call [phone] ([phone]), option 7    You can also visit https://www.vaccines.gov/ to find  vaccines in your area.    OUR LOCATION:    [facility]  [address] [place] [address], [address]  [place], [place], [address] [phone]  Fax: [phone]    Lab services, Rheumatology, and OB/GYN are at this location as well.

## 2024-03-01 NOTE — ASSESSMENT & PLAN NOTE
Patient does have slight elevation in blood pressure today.  Given current illness, would not make any adjustments.

## 2024-03-04 ENCOUNTER — OFFICE VISIT (OUTPATIENT)
Dept: FAMILY MEDICINE CLINIC | Facility: CLINIC | Age: 54
End: 2024-03-04
Payer: COMMERCIAL

## 2024-03-04 VITALS
SYSTOLIC BLOOD PRESSURE: 132 MMHG | BODY MASS INDEX: 35.48 KG/M2 | HEART RATE: 76 BPM | WEIGHT: 254.38 LBS | DIASTOLIC BLOOD PRESSURE: 88 MMHG | OXYGEN SATURATION: 98 % | TEMPERATURE: 97.9 F

## 2024-03-04 DIAGNOSIS — I10 PRIMARY HYPERTENSION: ICD-10-CM

## 2024-03-04 DIAGNOSIS — J04.0 LARYNGITIS: ICD-10-CM

## 2024-03-04 DIAGNOSIS — J01.90 ACUTE NON-RECURRENT SINUSITIS, UNSPECIFIED LOCATION: Primary | ICD-10-CM

## 2024-03-04 PROCEDURE — 99214 OFFICE O/P EST MOD 30 MIN: CPT | Performed by: PHYSICIAN ASSISTANT

## 2024-03-04 RX ORDER — SULFAMETHOXAZOLE AND TRIMETHOPRIM 800; 160 MG/1; MG/1
1 TABLET ORAL EVERY 12 HOURS SCHEDULED
Qty: 20 TABLET | Refills: 0 | Status: SHIPPED | OUTPATIENT
Start: 2024-03-04 | End: 2024-03-14

## 2024-03-04 NOTE — PROGRESS NOTES
Name: Santos Santos      : 1970      MRN: [id number]  Encounter Provider: Giles Hutsno PA-C  Encounter Date: 3/4/2024   [facility]    Assessment & Plan     Patient Instructions   Assessment/plan:  1.  Acute sinusitis-will start patient on Bactrim DS, 1 tablet twice daily for 10 days.  Continue lozenges over-the-counter as necessary.  2.  Anxiety-stable on Lexapro 5 mg daily, no medication changes.  Patient did not take Zithromax because of concern over interaction with possible QT prolongation.  3.  Hypertension-presently stable with Toprol, losartan and hydrochlorothiazide combination.    1. Acute non-recurrent sinusitis, unspecified location  -     sulfamethoxazole-trimethoprim (BACTRIM DS) 800-160 mg per tablet; Take 1 tablet by mouth every 12 (twelve) hours for 10 days    2. Laryngitis  -     sulfamethoxazole-trimethoprim (BACTRIM DS) 800-160 mg per tablet; Take 1 tablet by mouth every 12 (twelve) hours for 10 days    3. Primary hypertension           Subjective      HPI: This is a 53-year-old gentleman that presents to the office with concerns over persistent laryngitis which has been going on for the last week.  He was seen in the office and diagnosed with right ear infection and prescribed Zithromax.  He went to the pharmacy and grew concerned with possible interaction between Zithromax and his Lexapro.  Together they may prolongated his QT interval and he did not take the medication and would like to discuss what else he can do for his laryngitis symptoms.  He has not been having any fevers or chills.  He continues to have a fairly normal energy level.  He does feel that there is some chronic postnasal drainage.  He is not coughing or having any chest congestion.      Review of Systems   Constitutional:  Negative for activity change, fatigue and fever.   HENT:  Positive for congestion, postnasal drip, rhinorrhea, sinus pressure and sore throat.  Negative for ear pain.    Respiratory:  Negative for cough, chest tightness, shortness of breath and wheezing.    Cardiovascular:  Negative for palpitations.   Gastrointestinal:  Negative for diarrhea and nausea.   Musculoskeletal:  Negative for arthralgias and myalgias.   Skin:  Negative for rash.   Neurological:  Negative for dizziness and numbness.   All other systems reviewed and are negative.      Current Outpatient Medications on File Prior to Visit   Medication Sig   • ergocalciferol (VITAMIN D2) 50,000 units Take 1 capsule (50,000 Units total) by mouth once a week   • escitalopram (LEXAPRO) 5 mg tablet TAKE 2 TABLETS BY MOUTH EVERY DAY   • Krill Oil 1000 MG CAPS krill oil   • losartan-hydrochlorothiazide (HYZAAR) 100-25 MG per tablet TAKE 1 TABLET BY MOUTH EVERY DAY   • Multiple Vitamins-Minerals (MULTIVITAMIN ADULT EXTRA C PO) one daily   • metoprolol succinate (TOPROL-XL) 25 mg 24 hr tablet Take 25 mg by mouth daily   • [DISCONTINUED] azithromycin (ZITHROMAX) 250 mg tablet Take 2 tablets on day 1, then 1 tablet daily days 2 through 5 (Patient not taking: Reported on 3/4/2024)   • [DISCONTINUED] LORazepam (ATIVAN) 1 mg tablet Take 1 tablet (1 mg total) by mouth daily as needed (severe anxiety)   • [DISCONTINUED] methocarbamol (ROBAXIN) 500 mg tablet Take 1 tablet (500 mg total) by mouth 3 (three) times a day as needed for muscle spasms (Patient not taking: Reported on 12/23/2022)   • [DISCONTINUED] propranolol (INDERAL) 10 mg tablet TAKE 1 TABLET (10 MG TOTAL) BY MOUTH 2 (TWO) TIMES A DAY AS NEEDED (ANXIETY OR PALPITATIONS) (Patient not taking: Reported on 12/23/2022)       Objective     /88 (BP Location: Left arm, Patient Position: Sitting, Cuff Size: Large)   Pulse 76   Temp 97.9 °F (36.6 °C) (Temporal)   Wt 115 kg (254 lb 6 oz)   SpO2 98%   BMI 35.48 kg/m²     Physical Exam  Vitals and nursing note reviewed.   Constitutional:       General: He is not in acute distress.     Appearance: He is  well-developed.   HENT:      Head: Normocephalic and atraumatic.      Mouth/Throat:      Pharynx: No oropharyngeal exudate.   Eyes:      General:         Right eye: No discharge.         Left eye: No discharge.      Pupils: Pupils are equal, round, and reactive to light.   Cardiovascular:      Rate and Rhythm: Normal rate and regular rhythm.      Heart sounds: Normal heart sounds. No murmur heard.     No friction rub.   Pulmonary:      Effort: Pulmonary effort is normal. No respiratory distress.      Breath sounds: Normal breath sounds. No wheezing or rales.   Musculoskeletal:         General: Normal range of motion.      Cervical back: Neck supple.   Lymphadenopathy:      Cervical: Cervical adenopathy present.   Skin:     General: Skin is warm and dry.      Findings: No erythema.   Neurological:      Mental Status: He is alert and oriented to person, place, and time.   Psychiatric:         Behavior: Behavior normal.       Giles Hutson PA-C

## 2024-03-04 NOTE — PATIENT INSTRUCTIONS
Assessment/plan:  1.  Acute sinusitis-will start patient on Bactrim DS, 1 tablet twice daily for 10 days.  Continue lozenges over-the-counter as necessary.  2.  Anxiety-stable on Lexapro 5 mg daily, no medication changes.  Patient did not take Zithromax because of concern over interaction with possible QT prolongation.  3.  Hypertension-presently stable with Toprol, losartan and hydrochlorothiazide combination.

## 2024-03-05 ENCOUNTER — TELEPHONE (OUTPATIENT)
Dept: FAMILY MEDICINE CLINIC | Facility: CLINIC | Age: 54
End: 2024-03-05

## 2024-03-05 ENCOUNTER — TELEPHONE (OUTPATIENT)
Age: 54
End: 2024-03-05

## 2024-03-05 NOTE — TELEPHONE ENCOUNTER
CG called to ask about the interaction of bactrim DS and losartan. Pharmacy had called the patient to report the same. Pharmacist had also called the office and Bradley stated that he was aware of the interaction and that it was fine for short term use. I relayed this information to the CG. She said she would  the medication.

## 2024-03-05 NOTE — TELEPHONE ENCOUNTER
Rocio from [facility] pharmacy called would like verification ok to dispense Bactrim -160 mg. Interaction with  losartan-hydrochlorothiazide 100-25 mg increases potassium levels Please call Zinch back at [phone].

## 2024-03-07 ENCOUNTER — APPOINTMENT (OUTPATIENT)
Dept: RADIOLOGY | Facility: MEDICAL CENTER | Age: 54
End: 2024-03-07
Payer: COMMERCIAL

## 2024-03-07 ENCOUNTER — OFFICE VISIT (OUTPATIENT)
Dept: OBGYN CLINIC | Facility: MEDICAL CENTER | Age: 54
End: 2024-03-07
Payer: COMMERCIAL

## 2024-03-07 VITALS
BODY MASS INDEX: 35.7 KG/M2 | SYSTOLIC BLOOD PRESSURE: 132 MMHG | HEIGHT: 71 IN | HEART RATE: 65 BPM | WEIGHT: 255 LBS | DIASTOLIC BLOOD PRESSURE: 80 MMHG

## 2024-03-07 DIAGNOSIS — D48.0 NEOPLASM OF UNCERTAIN BEHAVIOR OF BONE AND ARTICULAR CARTILAGE: Primary | ICD-10-CM

## 2024-03-07 DIAGNOSIS — M25.561 RIGHT KNEE PAIN, UNSPECIFIED CHRONICITY: ICD-10-CM

## 2024-03-07 PROCEDURE — 99204 OFFICE O/P NEW MOD 45 MIN: CPT | Performed by: STUDENT IN AN ORGANIZED HEALTH CARE EDUCATION/TRAINING PROGRAM

## 2024-03-07 PROCEDURE — 73552 X-RAY EXAM OF FEMUR 2/>: CPT
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
## 2024-03-08 PROBLEM — Z01.818 PRE-OP EVALUATION: Status: ACTIVE | Noted: 2023-11-09

## 2024-03-18 ENCOUNTER — HOSPITAL ENCOUNTER (OUTPATIENT)
Dept: RADIOLOGY | Facility: HOSPITAL | Age: 54
Discharge: HOME | End: 2024-03-18
Payer: COMMERCIAL

## 2024-03-18 DIAGNOSIS — M25.561 RIGHT KNEE PAIN, UNSPECIFIED CHRONICITY: ICD-10-CM

## 2024-03-18 DIAGNOSIS — D48.0 NEOPLASM OF UNCERTAIN BEHAVIOR OF BONE AND ARTICULAR CARTILAGE: ICD-10-CM

## 2024-03-18 PROCEDURE — A9503 TC99M MEDRONATE: HCPCS

## 2024-03-18 PROCEDURE — 78306 BONE IMAGING WHOLE BODY: CPT

## 2024-03-20 ENCOUNTER — HOSPITAL ENCOUNTER (OUTPATIENT)
Dept: MRI IMAGING | Facility: HOSPITAL | Age: 54
Discharge: HOME | End: 2024-03-20
Payer: COMMERCIAL

## 2024-03-20 DIAGNOSIS — M25.561 RIGHT KNEE PAIN, UNSPECIFIED CHRONICITY: ICD-10-CM

## 2024-03-20 DIAGNOSIS — D48.0 NEOPLASM OF UNCERTAIN BEHAVIOR OF BONE AND ARTICULAR CARTILAGE: ICD-10-CM

## 2024-03-20 PROCEDURE — A9585 GADOBUTROL INJECTION: HCPCS

## 2024-03-20 PROCEDURE — 73720 MRI LWR EXTREMITY W/O&W/DYE: CPT

## 2024-03-20 RX ORDER — GADOBUTROL 604.72 MG/ML
11 INJECTION INTRAVENOUS
Status: COMPLETED | OUTPATIENT
Start: 2024-03-20 | End: 2024-03-20

## 2024-03-20 RX ADMIN — GADOBUTROL 11 ML: 604.72 INJECTION INTRAVENOUS at 15:00

## 2024-03-28 ENCOUNTER — OFFICE VISIT (OUTPATIENT)
Dept: OBGYN CLINIC | Facility: MEDICAL CENTER | Age: 54
End: 2024-03-28
Payer: COMMERCIAL

## 2024-03-28 VITALS
HEIGHT: 71 IN | HEART RATE: 68 BPM | WEIGHT: 256 LBS | DIASTOLIC BLOOD PRESSURE: 77 MMHG | BODY MASS INDEX: 35.84 KG/M2 | SYSTOLIC BLOOD PRESSURE: 118 MMHG

## 2024-03-28 DIAGNOSIS — D48.0 NEOPLASM OF UNCERTAIN BEHAVIOR OF BONE AND ARTICULAR CARTILAGE: Primary | ICD-10-CM

## 2024-03-28 PROCEDURE — 99214 OFFICE O/P EST MOD 30 MIN: CPT | Performed by: STUDENT IN AN ORGANIZED HEALTH CARE EDUCATION/TRAINING PROGRAM
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
## 2024-04-11 ENCOUNTER — OFFICE VISIT (OUTPATIENT)
Dept: NEUROLOGY | Facility: CLINIC | Age: 54
End: 2024-04-11
Payer: COMMERCIAL

## 2024-04-11 VITALS
HEART RATE: 64 BPM | TEMPERATURE: 98.1 F | WEIGHT: 261 LBS | SYSTOLIC BLOOD PRESSURE: 148 MMHG | BODY MASS INDEX: 36.54 KG/M2 | DIASTOLIC BLOOD PRESSURE: 81 MMHG | HEIGHT: 71 IN

## 2024-04-11 DIAGNOSIS — G43.009 MIGRAINE WITHOUT AURA AND WITHOUT STATUS MIGRAINOSUS, NOT INTRACTABLE: ICD-10-CM

## 2024-04-11 DIAGNOSIS — R29.818 SUSPECTED SLEEP APNEA: ICD-10-CM

## 2024-04-11 DIAGNOSIS — Z87.820 H/O CONCUSSION: ICD-10-CM

## 2024-04-11 DIAGNOSIS — G44.329 CHRONIC POST-TRAUMATIC HEADACHE, NOT INTRACTABLE: Primary | ICD-10-CM

## 2024-04-11 PROCEDURE — 99417 PROLNG OP E/M EACH 15 MIN: CPT | Performed by: PSYCHIATRY & NEUROLOGY

## 2024-04-11 PROCEDURE — 99215 OFFICE O/P EST HI 40 MIN: CPT | Performed by: PSYCHIATRY & NEUROLOGY

## 2024-04-11 RX ORDER — DEXAMETHASONE 4 MG/1
TABLET ORAL
Qty: 9 TABLET | Refills: 0 | Status: SHIPPED | OUTPATIENT
Start: 2024-04-11

## 2024-04-11 NOTE — PATIENT INSTRUCTIONS
"  Please get your suspected sleep apnea which there was no diagnosis of prior to the accident, which I think was made worse by the accident tested.    -If you feel like you could sleep on your back that night only, certainly could try to sleep on your back for the sleep study, but not if you feel like you cannot sleep this way.  Just getting sleep is the most important thing, as the machine thinks you are sleeping the entire time it is plug in. Otherwise we discussed home sleep study can underestimate the problem and try not to plug in the machine until you are just about to fall asleep.  If it comes back that you almost have sleep apnea or other sleep disorder, but not meeting criteria, we could consider in lab study and reach out to me if you would like this ordered before next visit.    I am placing a referral for a sleep study and if it is abnormal we will place one to the sleep medicine specialist team to further evaluate your sleep issues.    Please call [phone] to schedule the sleep study and we discussed due to the new order being labeled \" ambulatory referral to sleep medicine\" you have to wait 2 days for the sleep team to turn this referral into the actual sleep study that you can schedule, otherwise if you call now they will say there is no order for sleep study, just an order for a referral, because since the update even the scheduling team does not all realize that the order is included in the referral to sleep medicine.  The referral to sleep medicine piece is only if there is abnormalities and you need to see them afterwards.  Hopefully they fix this issue soon.  After the sleep study is completed if there is abnormal results that need treatment, I recommend you see one of the following providers:  Heladio Queen PA-C        Headache/migraine treatment:   Rescue " medications (for immediate treatment of a headache):   It is ok to take ibuprofen, acetaminophen or naproxen (Advil, Tylenol,  Aleve, Excedrin) if they help your headaches you should limit these to No more than 3 times a week to avoid medication overuse/rebound headaches.     -     dexamethasone (DECADRON) 4 mg tablet; 8 mg p.o. with breakfast for 1 to 2 days followed by 4 mg for 1 to 5 days for unrelenting migraine      Over the counter preventive supplements for headaches/migraines (if you try, try for 3 months straight)  (to take every day to help prevent headaches - not to take at the time of headache):  There are combo pills online of these - none of which regulated by FDA and double check dosing - take appropriate dose only once a day- preventa migraine, migravent, mind ease, migrelief   [] Magnesium 400mg daily (If any diarrhea or upset stomach, decrease dose  as tolerated)  [] Riboflavin (Vitamin B2) 400mg daily - try online   (FYI B2 may make your urine bright/neon yellow)  AND/OR  [] Herbal medication: Petasites/Butterbur 150 mg daily - try online  (When choosing your Butterbur online or in the store, beware that there are some poor preps containing pyrrolizidine alkaloids (PAs) that can be harmful to the liver. Therefore, do not use butterbur products that are not labeled as PA-free.)      Prescription preventive medications for headaches/migraines   (to take every day to help prevent headaches - not to take at the time of headache):  [x] We have options         *Typically these types of medications take time until you see the benefit, although some may see improvement in days, often it may take weeks, especially if the medication is being titrated up to a beneficial level. Please contact us if there are any concerns or questions regarding the medication.     Lifestyle Recommendations:  [x] SLEEP - Maintain a regular sleep schedule: Adults need at least 7-8 hours of uninterrupted a night. Maintain good  sleep hygiene:  Going to bed and waking up at consistent times, avoiding excessive daytime naps, avoiding caffeinated beverages in the evening, avoid excessive stimulation in the evening and generally using bed primarily for sleeping.  One hour before bedtime would recommend turning lights down lower, decreasing your activity (may read quietly, listen to music at a low volume). When you get into bed, should eliminate all technology (no texting, emailing, playing with your phone, iPad or tablet in bed).  [x] HYDRATION - Maintain good hydration.  Drink  2L of fluid a day (4 typical small water bottles)  [x] DIET - Maintain good nutrition. In particular don't skip meals and try and eat healthy balanced meals regularly.  [x] TRIGGERS - Look for other triggers and avoid them: Limit caffeine to 1-2 cups a day or less. Avoid dietary triggers that you have noticed bring on your headaches (this could include aged cheese, peanuts, MSG, aspartame and nitrates).  [x] EXERCISE - physical exercise as we all know is good for you in many ways, and not only is good for your heart, but also is beneficial for your mental health, cognitive health and  chronic pain/headaches. I would encourage at the least 5 days of physical exercise weekly for at least 30 minutes.     Education and Follow-up  [x] Please call with any questions or concerns. Of course if any new concerning symptoms go to the emergency department.  [x] Follow up 4-12 weeks, sooner if needed  Ideally at least 1 week after mri brain

## 2024-04-11 NOTE — PROGRESS NOTES
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 1 to 5 days for unrelenting migraine 9 tablet 0    ergocalciferol (VITAMIN D2) 50,000 units Take 1 capsule (50,000 Units total) by mouth once a week 20 capsule 2    escitalopram (LEXAPRO) 5 mg tablet TAKE 2 TABLETS BY MOUTH EVERY  tablet 1    Krill Oil 1000 MG CAPS krill oil      losartan-hydrochlorothiazide (HYZAAR) 100-25 MG per tablet TAKE 1 TABLET BY MOUTH EVERY DAY 90 tablet 3    metoprolol succinate (TOPROL-XL) 25 mg 24 hr tablet Take 25 mg by mouth daily      Multiple Vitamins-Minerals (MULTIVITAMIN ADULT EXTRA C PO) one daily       No current facility-administered medications for this visit.        Allergies:      Allergies   Allergen Reactions    Mucinex Childrens [Dextromethorphan-Guaifenesin] Other (See Comments)     Feels terrible, \"I feel messed up.\"    Penicillins Other (See Comments)       Family History:     Family History   Problem Relation Age of Onset    Breast cancer Mother     Arthritis Father     Colon cancer Father     Coronary artery disease Father     Nephrolithiasis Father     Lung cancer Father     Testicular cancer Father     Osteoporosis Father     Cerebral palsy Brother        Social History: " "      Social History     Socioeconomic History    Marital status: /Civil Union     Spouse name: Not on file    Number of children: Not on file    Years of education: Not on file    Highest education level: Not on file   Occupational History    Not on file   Tobacco Use    Smoking status: Never    Smokeless tobacco: Never   Vaping Use    Vaping status: Never Used   Substance and Sexual Activity    Alcohol use: No     Comment: Per [employer]: drinks socially    Drug use: No    Sexual activity: Not on file   Other Topics Concern    Not on file   Social History Narrative    Not on file     Social Determinants of Health     Financial Resource Strain: Not on file   Food Insecurity: Not on file   Transportation Needs: Not on file   Physical Activity: Not on file   Stress: Not on file   Social Connections: Not on file   Intimate Partner Violence: Not on file   Housing Stability: Not on file         Objective:           Physical Exam:                                                                 Vitals:            Constitutional:    /81 (BP Location: Right arm, Patient Position: Sitting, Cuff Size: Standard)   Pulse 64   Temp 98.1 °F (36.7 °C) (Temporal)   Ht 5' 11\" (1.803 m)   Wt 118 kg (261 lb)   BMI 36.40 kg/m²   BP Readings from Last 3 Encounters:   04/11/24 148/81   03/28/24 118/77   03/07/24 132/80     Pulse Readings from Last 3 Encounters:   04/11/24 64   03/28/24 68   03/07/24 65         Well developed, well nourished, well groomed.        Psychiatric:  Normal behavior and appropriate affect        Neurological Examination:     Mental status/cognitive function:   Recent and remote memory appear intact to history today. Attention span and concentration as well as fund of knowledge are appropriate for age. Normal language and spontaneous speech.  Cranial Nerves:   VII-facial expression symmetric  Motor Exam: symmetric bulk throughout. no atrophy, fasciculations or abnormal movements noted. "   Coordination:  no apparent dysmetria, ataxia or tremor noted  Gait: steady casual gait       Pertinent lab results:   See EMR for recent labs    7/19/2023 prolactin 13.14 elevated  8/10/2023 prolactin 15.82 elevated  9/20/2023 prolactin 12.7  now normal    - 9/28/23 CMP unremarkable  - 7/10/23 CBC unremarkable except for WBC 4.2  TSH normal  - 2/14/2023 MAYNOR normal     Imaging:   I have personally reviewed imaging and radiology read   - MRI Brain pituitary with and without contrast 8/30/2023: Partially empty sella. No pituitary macroadenoma. No pituitary microadenoma evident with note made that pituitary microadenoma cannot be excluded by imaging. Mild  altered signal involving white matter discussed above is nonspecific regarding etiology; findings most often relating to chronic small vessel white matter ischemic disease.  Several small polyps or mucous retention cysts within the maxillary sinuses bilaterally.  Temporal bone fluid on the right  noted above is a nonspecific finding most often bland effusion; less likely infectious/inflammatory in nature. Ordered by endocrinology and later followed up with his comprehensive neurologist Dr. Mejia.*As of my retrospective review 4/11/2024 we discussed his empty sella, in my opinion prominence to bilateral optic nerve sheaths and some tortuosity, no tight ventricles/normal, cerebellar tonsils overlie the foramen magnum with tight junction right greater than left    - MRI C-spine without contrast 3/29/2017:  1. Multilevel disc degeneration cervical spondylosis number greatest at C6-7.   2. Specifically, at C6-7, there is a right paracentral/proximal foraminal   herniation. This impresses upon the right ventral thecal sac and severely   narrows the proximal right neural foramen. Suggest clinical correlation for   right C7 radiculopathy. There is no significant change in comparison to the   prior examination.      Review of Systems:   ROS obtained by medical assistant and  reviewed, but if any symptoms listed below say negative, does not mean patient has not had this symptom since last visit, please see HPI for details of symptoms discussed this visit.  Regarding any abnormal or positive findings in ROS that are not neurologic related, patient instructed to address these issues with PCP or go to the ER if they are severe.      Review of Systems   Constitutional:  Negative for appetite change and fever.   HENT:  Positive for tinnitus (right ear). Negative for hearing loss, trouble swallowing and voice change.    Eyes:  Positive for photophobia. Negative for pain.   Respiratory: Negative.  Negative for shortness of breath.    Cardiovascular: Negative.  Negative for palpitations.   Gastrointestinal:  Negative for nausea and vomiting.   Endocrine: Negative.  Negative for cold intolerance.   Genitourinary: Negative.  Negative for dysuria, frequency and urgency.   Musculoskeletal: Negative.  Negative for myalgias and neck pain.   Skin: Negative.  Negative for rash.   Neurological:  Positive for headaches. Negative for dizziness, tremors, seizures, syncope, facial asymmetry, speech difficulty, weakness, light-headedness and numbness.        Brain fog  Trouble staying focused    Hematological: Negative.  Does not bruise/bleed easily.   Psychiatric/Behavioral: Negative.  Negative for confusion, hallucinations and sleep disturbance.    All other systems reviewed and are negative.       I have spent 63 minutes with Patient today in which greater than 50% of this time was spent in counseling/coordination of care regarding Diagnostic results, Prognosis, Risks and benefits of tx options, Instructions for management, Patient  education, Importance of tx compliance, Risk factor reductions, Impressions, Counseling / Coordination of care, Documenting in the medical record, Reviewing / ordering tests, medicine, procedures  , Obtaining or reviewing history  , and Communicating with other healthcare  professionals . I also spent 34 minutes non face to face for this patient the same day.         Author:  [place] Tinoco MD 4/11/2024 6:57 PM

## 2024-04-11 NOTE — PROGRESS NOTES
Review of Systems   Constitutional:  Negative for appetite change and fever.   HENT:  Positive for tinnitus (right ear). Negative for hearing loss, trouble swallowing and voice change.    Eyes:  Positive for photophobia. Negative for pain.   Respiratory: Negative.  Negative for shortness of breath.    Cardiovascular: Negative.  Negative for palpitations.   Gastrointestinal:  Negative for nausea and vomiting.   Endocrine: Negative.  Negative for cold intolerance.   Genitourinary: Negative.  Negative for dysuria, frequency and urgency.   Musculoskeletal: Negative.  Negative for myalgias and neck pain.   Skin: Negative.  Negative for rash.   Neurological:  Positive for headaches. Negative for dizziness, tremors, seizures, syncope, facial asymmetry, speech difficulty, weakness, light-headedness and numbness.        Brain fog  Trouble staying focused    Hematological: Negative.  Does not bruise/bleed easily.   Psychiatric/Behavioral: Negative.  Negative for confusion, hallucinations and sleep disturbance.    All other systems reviewed and are negative.

## 2024-04-13 DIAGNOSIS — R29.818 SUSPECTED SLEEP APNEA: Primary | ICD-10-CM

## 2024-04-26 ENCOUNTER — TELEPHONE (OUTPATIENT)
Dept: NEUROLOGY | Facility: CLINIC | Age: 54
End: 2024-04-26

## 2024-04-26 NOTE — TELEPHONE ENCOUNTER
"Recd  4/24 2:22 PM   gerhard scott. My birthday is [birth year]. I  am looking for the  phone number  to get the sleep apnea done. I lost it. I was supposed to call. Make sure I get that scheduled. Also I had a question if I'm allowed to travel on a plane because of the pressure in my head? I just want to make sure. You can reach me at [phone].   ____________  Last visit Dr. Tinoco, 4/11    I gave him sleep medicine number for instructions on home study test.    He is reporting intermittent discoloration of his ears, red/purple in color then my \"face feels flushed\" followed by pressure in his head and then resolves throughout the day. We discussed he was treated for sinusitis (per chart review) last month however patient said he was unable to complete the course of abx; suggested he call PCP to assess for continued infection as possible cause.    He is also asking for Dr. Tinoco input on traveling on a plane as he had hoped to plan a trip with his daughter in mid may for her birthday; he said he is fearful that pain in his head will increase; discussed with change in altitude may occur; please provide recommendation, thanks for your help..      "

## 2024-04-26 NOTE — TELEPHONE ENCOUNTER
Yes, classically headaches and migraines can sometimes be exacerbated in flight due to pressure changes, but there is no known absolute contraindication to flight.  If he tolerated the dexamethasone okay, if he is worried he could always take this the day of the flight prophylactically.    Otherwise, the symptoms he described could fit the picture that we discussed at the visit for what we label migraine

## 2024-05-01 ENCOUNTER — OFFICE VISIT (OUTPATIENT)
Dept: FAMILY MEDICINE CLINIC | Facility: CLINIC | Age: 54
End: 2024-05-01
Payer: COMMERCIAL

## 2024-05-01 ENCOUNTER — TELEPHONE (OUTPATIENT)
Dept: NEUROLOGY | Facility: CLINIC | Age: 54
End: 2024-05-01

## 2024-05-01 VITALS
DIASTOLIC BLOOD PRESSURE: 80 MMHG | OXYGEN SATURATION: 97 % | HEART RATE: 67 BPM | WEIGHT: 265 LBS | HEIGHT: 71 IN | BODY MASS INDEX: 37.1 KG/M2 | SYSTOLIC BLOOD PRESSURE: 120 MMHG | TEMPERATURE: 97.8 F

## 2024-05-01 DIAGNOSIS — R14.0 ABDOMINAL DISTENSION: ICD-10-CM

## 2024-05-01 DIAGNOSIS — K63.5 POLYP OF COLON, UNSPECIFIED PART OF COLON, UNSPECIFIED TYPE: ICD-10-CM

## 2024-05-01 DIAGNOSIS — I10 PRIMARY HYPERTENSION: Primary | ICD-10-CM

## 2024-05-01 DIAGNOSIS — E78.2 MIXED HYPERLIPIDEMIA: ICD-10-CM

## 2024-05-01 DIAGNOSIS — E55.9 VITAMIN D DEFICIENCY: ICD-10-CM

## 2024-05-01 DIAGNOSIS — D72.819 LEUKOPENIA, UNSPECIFIED TYPE: ICD-10-CM

## 2024-05-01 DIAGNOSIS — F41.9 ANXIETY: ICD-10-CM

## 2024-05-01 DIAGNOSIS — Z12.5 SCREENING FOR PROSTATE CANCER: ICD-10-CM

## 2024-05-01 DIAGNOSIS — K76.0 FATTY LIVER: ICD-10-CM

## 2024-05-01 PROCEDURE — 99214 OFFICE O/P EST MOD 30 MIN: CPT | Performed by: NURSE PRACTITIONER

## 2024-05-01 NOTE — PROGRESS NOTES
Name: Santos Santos      : 1970      MRN: [id number]  Encounter Provider: ERWIN Sullivan  Encounter Date: 2024   Encounter department: formerly [facility] PRIMARY CARE    Assessment & Plan     1. Primary hypertension  Assessment & Plan:  Patient blood pressure is doing well.   Continue medications  -losartan/hctz 100/25  -metoprolol 25mg       2. Fatty liver  Assessment & Plan:  Discussed with patient he should continue ongoing efforts to reduce his weight to improve his fatty liver. We discussed that fatty liver can lead to cirrhosis. We discussed low fat diet and heart healthy diet.   Ultrasound ordered.   He did have an elastography ultrasound done which showed no fibrosis.     Orders:  -     US right upper quadrant; Future; Expected date: 2024    3. Polyp of colon, unspecified part of colon, unspecified type  Assessment & Plan:  Patient is due for colonoscopy. Wants to see tabby. Referral entered     Orders:  -     Ambulatory Referral to Colorectal Surgery; Future    4. Mixed hyperlipidemia  Assessment & Plan:  Discussed with patient about starting statins.   He is taking krill oil  Reviewed ASCVD risk calculations and why it is recommended.   He had had two calcium scores done both which have read as zero as to further risk stratify him by his cardiologist. He also had stress test that was negative. In discussion of all of these factors patient wants to remain off medication since he has had several discussions by his cardiologist and remains bordeline. In review there was a CTA coronary arteries ordered but do not see results. Of any note patient reports he started an aggressive diet by his wife. He is hoping to lower his cholesterol that way. Lipid panel was ordered since his last LDL was in September.     Orders:  -     Lipid Panel with Direct LDL reflex; Future  -     Comprehensive metabolic panel    5. Vitamin D deficiency  Assessment & Plan:  Taking vitamin d weekly   Due for labs  to re evaluate     Orders:  -     Vitamin D 25 hydroxy    6. Leukopenia, unspecified type  Assessment & Plan:  Continue care with hematology. Reviewed his prior WBC counts they have fluctuated so likelihood of being myeloproliferative is low however warrants continued monitoring since patient has bone marrow lesion for which is is following with our ortho oncologist.     Orders:  -     CBC and differential; Future    7. Anxiety  Assessment & Plan:  Patient reports since his head injury he had disrupted mood but is doing very well on current lexapro. Discussed with patient that this is not uncommon in individuals whom have suffered brain injury       8. Screening for prostate cancer  -     PSA, Total Screen; Future    9. Abdominal distension  Assessment & Plan:  Most like secondary to GI bug. Although he notes some distention was occured before he was sick. Again discussed diet with patient. Advised to consider IBGuard as a supplement. Can take gas x. He is concerned because there is family history of GI related cancer and symptoms started similarly. Discussed with patient would need to evaluate with GI for upper endoscopy     Orders:  -     Ambulatory Referral to Gastroenterology; Future           Subjective      Patient reports that over the last 5 days after eating he gets hard and bloated. He is passing his bowel fine, he is also passing gas on and off. He has been watching what he eats as well- is wife has been really on top of his diet. Having cereal with banana, chicken orange with rice and white beans, low salt in his diet and dinner more chicken and hard boiled eggs.   He also doesn't work out anymore like he used to because of his neck and knees  Has been drinking more water   He purchased probiotic and has been drinking that daily   Also last two days felt facial flushing and warmth in his face- this has passed    Patient reports he saw cardiology at [facility]- he states he had calcium score done which were  "normal and zero.   He also had lifeline screening done which was normal  He has normal stress test       Review of Systems   Gastrointestinal:  Positive for abdominal distention. Negative for constipation, diarrhea, nausea and vomiting.   Endocrine: Positive for polyuria (at night).       Current Outpatient Medications on File Prior to Visit   Medication Sig   • ergocalciferol (VITAMIN D2) 50,000 units Take 1 capsule (50,000 Units total) by mouth once a week   • escitalopram (LEXAPRO) 5 mg tablet TAKE 2 TABLETS BY MOUTH EVERY DAY   • Krill Oil 1000 MG CAPS krill oil   • losartan-hydrochlorothiazide (HYZAAR) 100-25 MG per tablet TAKE 1 TABLET BY MOUTH EVERY DAY   • Multiple Vitamins-Minerals (MULTIVITAMIN ADULT EXTRA C PO) one daily   • dexamethasone (DECADRON) 4 mg tablet 8 mg p.o. with breakfast for 1 to 2 days followed by 4 mg for 1 to 5 days for unrelenting migraine (Patient not taking: Reported on 5/1/2024)   • metoprolol succinate (TOPROL-XL) 25 mg 24 hr tablet Take 25 mg by mouth daily   • [DISCONTINUED] LORazepam (ATIVAN) 1 mg tablet Take 1 tablet (1 mg total) by mouth daily as needed (severe anxiety)   • [DISCONTINUED] methocarbamol (ROBAXIN) 500 mg tablet Take 1 tablet (500 mg total) by mouth 3 (three) times a day as needed for muscle spasms (Patient not taking: Reported on 12/23/2022)   • [DISCONTINUED] propranolol (INDERAL) 10 mg tablet TAKE 1 TABLET (10 MG TOTAL) BY MOUTH 2 (TWO) TIMES A DAY AS NEEDED (ANXIETY OR PALPITATIONS) (Patient not taking: Reported on 12/23/2022)       Objective     /80 (BP Location: Left arm, Patient Position: Sitting, Cuff Size: Adult)   Pulse 67   Temp 97.8 °F (36.6 °C) (Tympanic)   Ht 5' 11\" (1.803 m)   Wt 120 kg (265 lb)   SpO2 97%   BMI 36.96 kg/m²     Physical Exam  Vitals and nursing note reviewed.   Constitutional:       Appearance: Normal appearance. He is well-developed. He is not ill-appearing.   HENT:      Head: Normocephalic and atraumatic.   Eyes:      " Extraocular Movements: Extraocular movements intact.      Conjunctiva/sclera: Conjunctivae normal.      Pupils: Pupils are equal.   Cardiovascular:      Rate and Rhythm: Normal rate and regular rhythm.      Pulses:           Carotid pulses are 2+ on the right side and 2+ on the left side.     Heart sounds: S1 normal and S2 normal. No murmur heard.  Pulmonary:      Effort: Pulmonary effort is normal. No respiratory distress.      Breath sounds: Normal breath sounds.   Abdominal:      General: Abdomen is protuberant. Bowel sounds are normal. There is distension (mild).      Palpations: Abdomen is soft.      Tenderness: There is no abdominal tenderness.      Hernia: No hernia is present.   Musculoskeletal:      Right lower leg: No edema.      Left lower leg: No edema.   Neurological:      Mental Status: He is alert and oriented to person, place, and time.   Psychiatric:         Mood and Affect: Mood normal.         Thought Content: Thought content normal.         ERWIN Sullivan

## 2024-05-01 NOTE — TELEPHONE ENCOUNTER
Called and spoke to patient to confirm their upcoming appointment with Dr. Tinoco. Informed patient about arriving in the [place] location 15 minutes prior to their appointment to get checked in and going over chart.

## 2024-05-01 NOTE — TELEPHONE ENCOUNTER
Pt made aware of below.    He will discuss this more at upcoming appt with dr meza.   Nothing needed at this time

## 2024-05-05 PROBLEM — R14.0 ABDOMINAL DISTENSION: Status: ACTIVE | Noted: 2024-05-05

## 2024-05-06 NOTE — ASSESSMENT & PLAN NOTE
Patient reports since his head injury he had disrupted mood but is doing very well on current lexapro. Discussed with patient that this is not uncommon in individuals whom have suffered brain injury

## 2024-05-06 NOTE — ASSESSMENT & PLAN NOTE
Discussed with patient he should continue ongoing efforts to reduce his weight to improve his fatty liver. We discussed that fatty liver can lead to cirrhosis. We discussed low fat diet and heart healthy diet.   Ultrasound ordered.   He did have an elastography ultrasound done which showed no fibrosis.

## 2024-05-06 NOTE — ASSESSMENT & PLAN NOTE
Discussed with patient about starting statins.   He is taking krill oil  Reviewed ASCVD risk calculations and why it is recommended.   He had had two calcium scores done both which have read as zero as to further risk stratify him by his cardiologist. He also had stress test that was negative. In discussion of all of these factors patient wants to remain off medication since he has had several discussions by his cardiologist and remains bordeline. In review there was a CTA coronary arteries ordered but do not see results. Of any note patient reports he started an aggressive diet by his wife. He is hoping to lower his cholesterol that way. Lipid panel was ordered since his last LDL was in September.

## 2024-05-06 NOTE — ASSESSMENT & PLAN NOTE
Most like secondary to GI bug. Although he notes some distention was occured before he was sick. Again discussed diet with patient. Advised to consider IBGuard as a supplement. Can take gas x. He is concerned because there is family history of GI related cancer and symptoms started similarly. Discussed with patient would need to evaluate with GI for upper endoscopy

## 2024-05-06 NOTE — ASSESSMENT & PLAN NOTE
Continue care with hematology. Reviewed his prior WBC counts they have fluctuated so likelihood of being myeloproliferative is low however warrants continued monitoring since patient has bone marrow lesion for which is is following with our ortho oncologist.

## 2024-05-09 ENCOUNTER — OFFICE VISIT (OUTPATIENT)
Dept: NEUROLOGY | Facility: CLINIC | Age: 54
End: 2024-05-09
Payer: COMMERCIAL

## 2024-05-09 VITALS
BODY MASS INDEX: 36.96 KG/M2 | DIASTOLIC BLOOD PRESSURE: 89 MMHG | WEIGHT: 264 LBS | TEMPERATURE: 98.1 F | SYSTOLIC BLOOD PRESSURE: 136 MMHG | HEART RATE: 71 BPM | HEIGHT: 71 IN

## 2024-05-09 DIAGNOSIS — R29.818 SUSPECTED SLEEP APNEA: ICD-10-CM

## 2024-05-09 DIAGNOSIS — G44.329 CHRONIC POST-TRAUMATIC HEADACHE, NOT INTRACTABLE: Primary | ICD-10-CM

## 2024-05-09 DIAGNOSIS — G43.009 MIGRAINE WITHOUT AURA AND WITHOUT STATUS MIGRAINOSUS, NOT INTRACTABLE: ICD-10-CM

## 2024-05-09 DIAGNOSIS — Z87.820 H/O CONCUSSION: ICD-10-CM

## 2024-05-09 PROCEDURE — 99215 OFFICE O/P EST HI 40 MIN: CPT | Performed by: PSYCHIATRY & NEUROLOGY

## 2024-05-09 PROCEDURE — 99417 PROLNG OP E/M EACH 15 MIN: CPT | Performed by: PSYCHIATRY & NEUROLOGY

## 2024-05-09 NOTE — PROGRESS NOTES
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    Intractable pain    Leukopenia    Palpitations    Cervical radiculopathy    Cervical spondylosis without myelopathy    Pain in joint involving ankle and foot    Lateral epicondylitis    Pain in elbow    Plantar fasciitis    Right flank pain    Strain of gastrocnemius tendon    Knee joint effusion    Derangement of medial meniscus    Rib pain on right side    Benign prostatic hyperplasia with nocturia    Sprain of metacarpophalangeal joint    Anxiety    Concussion    Entropion, left    Colon polyp    Obesity    Right lower quadrant abdominal pain    Fatty liver    Back muscle spasm    Pre-op evaluation    Vitamin D deficiency    Abdominal distension       Medications:      Current Outpatient Medications   Medication Sig Dispense Refill    ergocalciferol (VITAMIN D2) 50,000 units Take 1 capsule (50,000 Units total) by mouth once a week 20 capsule 2    escitalopram (LEXAPRO) 5 mg tablet TAKE 2 TABLETS BY MOUTH EVERY  tablet 1    Krill Oil 1000 MG CAPS krill oil      losartan-hydrochlorothiazide (HYZAAR) 100-25 MG per tablet TAKE 1 TABLET BY MOUTH EVERY DAY 90 tablet 3    metoprolol succinate (TOPROL-XL) 25 mg 24 hr tablet Take 25 mg by mouth daily      Multiple Vitamins-Minerals (MULTIVITAMIN ADULT EXTRA C PO) one daily      dexamethasone (DECADRON) 4 mg tablet 8 mg p.o. with  "breakfast for 1 to 2 days followed by 4 mg for 1 to 5 days for unrelenting migraine (Patient not taking: Reported on 5/1/2024) 9 tablet 0     No current facility-administered medications for this visit.        Allergies:      Allergies   Allergen Reactions    Mucinex Childrens [Dextromethorphan-Guaifenesin] Other (See Comments)     Feels terrible, \"I feel messed up.\"    Penicillins Other (See Comments)       Family History:     Family History   Problem Relation Age of Onset    Breast cancer Mother     Arthritis Father     Colon cancer Father     Coronary artery disease Father     Nephrolithiasis Father     Lung cancer Father     Testicular cancer Father     Osteoporosis Father     Cerebral palsy Brother        Social History:     Social History     Socioeconomic History    Marital status: /Civil Union     Spouse name: Not on file    Number of children: Not on file    Years of education: Not on file    Highest education level: Not on file   Occupational History    Not on file   Tobacco Use    Smoking status: Never    Smokeless tobacco: Never   Vaping Use    Vaping status: Never Used   Substance and Sexual Activity    Alcohol use: No     Comment: Per [employer]: drinks socially    Drug use: No    Sexual activity: Not on file   Other Topics Concern    Not on file   Social History Narrative    Not on file     Social Determinants of Health     Financial Resource Strain: Not on file   Food Insecurity: Not on file   Transportation Needs: Not on file   Physical Activity: Not on file   Stress: Not on file   Social Connections: Not on file   Intimate Partner Violence: Not on file   Housing Stability: Not on file         Objective:           Physical Exam:                                                                 Vitals:            Constitutional:    /89 (BP Location: Right arm, Patient Position: Sitting, Cuff Size: Standard)   Pulse 71   Temp 98.1 °F (36.7 °C) (Temporal)   Ht 5' 11\" (1.803 m)   Wt 120 " kg (264 lb)   BMI 36.82 kg/m²   BP Readings from Last 3 Encounters:   05/09/24 136/89   05/01/24 120/80   04/11/24 148/81     Pulse Readings from Last 3 Encounters:   05/09/24 71   05/01/24 67   04/11/24 64         Well developed, well nourished, well groomed.        Psychiatric:  Normal behavior and appropriate affect        Neurological Examination:     Mental status/cognitive function:   Recent and remote memory appear intact. Attention span and concentration as well as fund of knowledge are appropriate for age. Normal language and spontaneous speech.  Cranial Nerves:   VII-facial expression symmetric  Motor Exam: symmetric bulk throughout. no atrophy, fasciculations or abnormal movements noted.   Coordination:  no apparent dysmetria, ataxia or tremor noted  Gait: steady casual gait        Pertinent lab results:   See EMR for recent labs     7/19/2023 prolactin 13.14 elevated  8/10/2023 prolactin 15.82 elevated  9/20/2023 prolactin 12.7  now normal     - 9/28/23 CMP unremarkable  - 7/10/23 CBC unremarkable except for WBC 4.2  TSH normal  - 2/14/2023 MAYNOR normal     Imaging:   I have personally reviewed imaging and radiology read   - MRI Brain pituitary with and without contrast 8/30/2023: Partially empty sella. No pituitary macroadenoma. No pituitary microadenoma evident with note made that pituitary microadenoma cannot be excluded by imaging. Mild  altered signal involving white matter discussed above is nonspecific regarding etiology; findings most often relating to chronic small vessel white matter ischemic disease.  Several small polyps or mucous retention cysts within the maxillary sinuses bilaterally.  Temporal bone fluid on the right  noted above is a nonspecific finding most often bland effusion; less likely infectious/inflammatory in nature. Ordered by endocrinology and later followed up with his comprehensive neurologist Dr. Mejia.*As of my retrospective review 4/11/2024 we discussed his empty sella,  in my opinion prominence to bilateral optic nerve sheaths and some tortuosity, no tight ventricles/normal, cerebellar tonsils overlie the foramen magnum with tight junction right greater than left     - MRI C-spine without contrast 3/29/2017:  1. Multilevel disc degeneration cervical spondylosis number greatest at C6-7.   2. Specifically, at C6-7, there is a right paracentral/proximal foraminal   herniation. This impresses upon the right ventral thecal sac and severely   narrows the proximal right neural foramen. Suggest clinical correlation for   right C7 radiculopathy. There is no significant change in comparison to the   prior examination.    Review of Systems:   ROS obtained by medical assistant and reviewed, but if any symptoms listed below say negative, does not mean patient has not had this symptom since last visit, please see HPI for details of symptoms discussed this visit.  Regarding any abnormal or positive findings in ROS that are not neurologic related, patient instructed to address these issues with PCP or go to the ER if they are severe.      Review of Systems   Constitutional:  Negative for appetite change and fever.   HENT: Negative.  Negative for hearing loss, tinnitus, trouble swallowing and voice change.    Eyes: Negative.  Negative for photophobia and pain.   Respiratory: Negative.  Negative for shortness of breath.    Cardiovascular: Negative.  Negative for palpitations.   Gastrointestinal:  Positive for nausea. Negative for vomiting.   Endocrine: Negative.  Negative for cold intolerance.   Genitourinary: Negative.  Negative for dysuria, frequency and urgency.   Musculoskeletal: Negative.  Negative for myalgias and neck pain.   Skin: Negative.  Negative for rash.   Neurological:  Positive for dizziness and headaches. Negative for tremors, seizures, syncope, facial asymmetry, speech difficulty, weakness, light-headedness and numbness.   Hematological: Negative.  Does not bruise/bleed easily.    Psychiatric/Behavioral:  Positive for sleep disturbance. Negative for confusion and hallucinations.         I have spent 38 minutes with Patient  today in which greater than 50% of this time was spent in counseling/coordination of care regarding Diagnostic results, Prognosis, Risks and benefits of tx options, Instructions for management, Patient education, Importance of tx compliance, Risk factor reductions, Impressions, Counseling / Coordination of care, Documenting in the medical record, Reviewing / ordering tests, medicine, procedures  , Obtaining or reviewing history  , and Communicating with other healthcare professionals . I also spent 20 minutes non face to face for this patient the same day.       Author:  Florida Tinoco MD 5/9/2024 3:25 PM

## 2024-05-09 NOTE — PROGRESS NOTES
Review of Systems   Constitutional:  Negative for appetite change and fever.   HENT: Negative.  Negative for hearing loss, tinnitus, trouble swallowing and voice change.    Eyes: Negative.  Negative for photophobia and pain.   Respiratory: Negative.  Negative for shortness of breath.    Cardiovascular: Negative.  Negative for palpitations.   Gastrointestinal:  Positive for nausea. Negative for vomiting.   Endocrine: Negative.  Negative for cold intolerance.   Genitourinary: Negative.  Negative for dysuria, frequency and urgency.   Musculoskeletal: Negative.  Negative for myalgias and neck pain.   Skin: Negative.  Negative for rash.   Neurological:  Positive for dizziness and headaches. Negative for tremors, seizures, syncope, facial asymmetry, speech difficulty, weakness, light-headedness and numbness.   Hematological: Negative.  Does not bruise/bleed easily.   Psychiatric/Behavioral:  Positive for sleep disturbance. Negative for confusion and hallucinations.

## 2024-05-09 NOTE — PATIENT INSTRUCTIONS
Please get your suspected sleep apnea which there was no diagnosis of prior to the accident, which I think was made worse by the accident tested.    Thank you for scheduling your home sleep study for 5/13/2024 and follow-up scheduled for 6/18/2024 if needed      Headache/migraine treatment:   Rescue medications (for immediate treatment of a headache):   It is ok to take ibuprofen, acetaminophen or naproxen (Advil, Tylenol,  Aleve, Excedrin) if they help your headaches you should limit these to No more than 3 times a week to avoid medication overuse/rebound headaches.     -     dexamethasone (DECADRON) 4 mg tablet; 8 mg p.o. with breakfast for 1 to 2 days followed by 4 mg for 1 to 5 days for unrelenting migraine      Over the counter preventive supplements for headaches/migraines (if you try, try for 3 months straight)  (to take every day to help prevent headaches - not to take at the time of headache):  There are combo pills online of these - none of which regulated by FDA and double check dosing - take appropriate dose only once a day- preventa migraine, migravent, mind ease, migrelief   [] Magnesium 400mg daily (If any diarrhea or upset stomach, decrease dose  as tolerated)  [] Riboflavin (Vitamin B2) 400mg daily - try online   (FYI B2 may make your urine bright/neon yellow)  AND/OR  [] Herbal medication: Petasites/Butterbur 150 mg daily - try online  (When choosing your Butterbur online or in the store, beware that there are some poor preps containing pyrrolizidine alkaloids (PAs) that can be harmful to the liver. Therefore, do not use butterbur products that are not labeled as PA-free.)      Prescription preventive medications for headaches/migraines   (to take every day to help prevent headaches - not to take at the time of headache):  [x] We have options         *Typically these types of medications take time until you see the benefit, although some may see improvement in days, often it may take weeks,  especially if the medication is being titrated up to a beneficial level. Please contact us if there are any concerns or questions regarding the medication.     Lifestyle Recommendations:  [x] SLEEP - Maintain a regular sleep schedule: Adults need at least 7-8 hours of uninterrupted a night. Maintain good sleep hygiene:  Going to bed and waking up at consistent times, avoiding excessive daytime naps, avoiding caffeinated beverages in the evening, avoid excessive stimulation in the evening and generally using bed primarily for sleeping.  One hour before bedtime would recommend turning lights down lower, decreasing your activity (may read quietly, listen to music at a low volume). When you get into bed, should eliminate all technology (no texting, emailing, playing with your phone, iPad or tablet in bed).  [x] HYDRATION - Maintain good hydration.  Drink  2L of fluid a day (4 typical small water bottles)  [x] DIET - Maintain good nutrition. In particular don't skip meals and try and eat healthy balanced meals regularly.  [x] TRIGGERS - Look for other triggers and avoid them: Limit caffeine to 1-2 cups a day or less. Avoid dietary triggers that you have noticed bring on your headaches (this could include aged cheese, peanuts, MSG, aspartame and nitrates).  [x] EXERCISE - physical exercise as we all know is good for you in many ways, and not only is good for your heart, but also is beneficial for your mental health, cognitive health and  chronic pain/headaches. I would encourage at the least 5 days of physical exercise weekly for at least 30 minutes.     Education and Follow-up  [x] Please call with any questions or concerns. Of course if any new concerning symptoms go to the emergency department.  [x] Follow up 4-12 weeks, sooner if needed  If you do not need me at the time of next visit if for example sleep medicine has answered all your questions you could absolutely push back to 3-month follow-up

## 2024-05-28 ENCOUNTER — LAB REQUISITION (OUTPATIENT)
Dept: LAB | Facility: HOSPITAL | Age: 54
End: 2024-05-28
Payer: COMMERCIAL

## 2024-05-28 DIAGNOSIS — Z12.11 ENCOUNTER FOR SCREENING FOR MALIGNANT NEOPLASM OF COLON: ICD-10-CM

## 2024-05-28 PROCEDURE — 88305 TISSUE EXAM BY PATHOLOGIST: CPT | Performed by: PATHOLOGY

## 2024-05-29 PROCEDURE — 88305 TISSUE EXAM BY PATHOLOGIST: CPT | Performed by: PATHOLOGY

## 2024-06-04 ENCOUNTER — OFFICE VISIT (OUTPATIENT)
Dept: GASTROENTEROLOGY | Facility: CLINIC | Age: 54
End: 2024-06-04
Payer: COMMERCIAL

## 2024-06-04 VITALS
SYSTOLIC BLOOD PRESSURE: 124 MMHG | WEIGHT: 263.4 LBS | HEIGHT: 71 IN | TEMPERATURE: 98.2 F | BODY MASS INDEX: 36.88 KG/M2 | DIASTOLIC BLOOD PRESSURE: 80 MMHG

## 2024-06-04 DIAGNOSIS — Z86.010 PERSONAL HISTORY OF COLONIC POLYPS: ICD-10-CM

## 2024-06-04 DIAGNOSIS — R14.0 BLOATING: Primary | ICD-10-CM

## 2024-06-04 DIAGNOSIS — K76.0 FATTY LIVER: ICD-10-CM

## 2024-06-04 DIAGNOSIS — R63.5 ABNORMAL WEIGHT GAIN: ICD-10-CM

## 2024-06-04 PROCEDURE — 99204 OFFICE O/P NEW MOD 45 MIN: CPT | Performed by: PHYSICIAN ASSISTANT

## 2024-06-04 NOTE — PROGRESS NOTES
[facility] - Outpatient Consultation New Patient  Santos Santos 53 y.o. male MRN: [id number]  Encounter: [id number]  ASSESSMENT AND PLAN:    1. Bloating  2. Abnormal weight gain  Patient complaining of bloating and abnormal weight gain.  He denies GERD symptoms, dysphagia, abdominal pain.  He is up-to-date on screening colonoscopy.    Will check celiac panel, TSH, stool H. Pylori  We will plan for complete abdominal ultrasound  Etiology of complaints overall unclear at this time  Encouraged a healthy, well-rounded diet and adequate hydration    - Ambulatory Referral to Gastroenterology  - IgA; Future  - Tissue transglutaminase, IgA; Future  - US abdomen complete; Future  - H. pylori antigen, stool; Future  - TSH, 3rd generation with Free T4 reflex; Future    3. Personal history of colonic polyps  He is up-to-date on screening colonoscopies, follows with Dr. Olivera    4. Fatty liver  I educated patient on hepatic steatosis/fatty liver.  We discussed that hepatic steatosis or fatty liver over time can lead to inflammation in the liver and liver damage.  I recommended and we discussed the importance of following a more low carb, high-fiber healthy diet, avoidance of processed foods, alcohol cessation, and exercise as tolerated to promote slow sustained weight loss as treatment for fatty liver.       Patient was instructed to call the office with any questions, concerns, new/ worsening/ persisting GI symptoms. Advised patient go to the ER with any severe or worsening abdominal pain, fevers/ chills, intractable N/V, chest pain, SOB, dizziness, lightheadedness, feeling something stuck in esophagus that will not go down. Patient expressed understanding and is in agreement with treatment plan.       Will plan to follow up after diagnostic tests in a few months  ________________________________________________________    HPI:    Patient is new to me.  Patient with a past medical history of  hypertension, allergies, colon polyp, fatty liver, anxiety, BPH, vitamin D deficiency presents to the office today with a chief complaint of bloating.  He was last seen in the office by Dr. Harman 2/2/2023, previous office note was reviewed.      Patient complains of abdominal bloating x several weeks.   The abdominal bloating is constant.  There are no aggravating or alleviating factors.  There is associated abnormal weight gain, patient has gained almost 20 pounds since last office visit. This weight gain is very frustrating and concerning to the patient.  Bowel movements are regular, formed, brown daily.  Patient denies any fevers/ chills, abdominal pain, nausea, vomiting, change in bowel habits, diarrhea, constipation, black or bloody stools, heartburn, dysphagia, odynophagia.   Denies chest pain, SOB    Tobacco use- None  Alcohol use- None  NSAID use- Rare naproxen use for headache   He denies recent changes in diet or lifestyle, medication    REVIEW OF SYSTEMS:    Review of Systems   Constitutional:  Negative for chills and fever.   HENT:  Negative for ear pain and sore throat.    Eyes:  Negative for pain and visual disturbance.   Respiratory:  Negative for cough and shortness of breath.    Cardiovascular:  Negative for chest pain and palpitations.   Gastrointestinal:  Negative for abdominal pain and vomiting.   Genitourinary:  Negative for dysuria and hematuria.   Musculoskeletal:  Negative for arthralgias and back pain.   Skin:  Negative for color change and rash.   Neurological:  Negative for seizures and syncope.   All other systems reviewed and are negative.       Historical Information   Past Medical History:   Diagnosis Date    Acute prostatitis 7/8/2020    Benign prostatic hyperplasia without lower urinary tract symptoms     Chronic prostatitis     Epididymitis 1/4/2018    Erectile dysfunction     Hypertension     Stress fracture of tibia 4/9/2019    Sees [facility].    Testicular hypogonadism      Past  "Surgical History:   Procedure Laterality Date    CYSTOSCOPY      Diagnostic Bladder    HERNIA REPAIR      SURGERY SCROTAL / TESTICULAR      Spermatic Cord for Varicocele- per [employer]     Social History   Social History     Substance and Sexual Activity   Alcohol Use No    Comment: Per [employer]: drinks socially     Social History     Substance and Sexual Activity   Drug Use No     Social History     Tobacco Use   Smoking Status Never   Smokeless Tobacco Never     Family History   Problem Relation Age of Onset    Breast cancer Mother     Arthritis Father     Colon cancer Father     Coronary artery disease Father     Nephrolithiasis Father     Lung cancer Father     Testicular cancer Father     Osteoporosis Father     Cerebral palsy Brother        Meds/Allergies       Current Outpatient Medications:     ergocalciferol (VITAMIN D2) 50,000 units    escitalopram (LEXAPRO) 5 mg tablet    Krill Oil 1000 MG CAPS    losartan-hydrochlorothiazide (HYZAAR) 100-25 MG per tablet    metoprolol succinate (TOPROL-XL) 25 mg 24 hr tablet    Multiple Vitamins-Minerals (MULTIVITAMIN ADULT EXTRA C PO)    dexamethasone (DECADRON) 4 mg tablet    Allergies   Allergen Reactions    Mucinex Childrens [Dextromethorphan-Guaifenesin] Other (See Comments)     Feels terrible, \"I feel messed up.\"    Penicillins Other (See Comments)           Objective   Wt Readings from Last 3 Encounters:   06/04/24 119 kg (263 lb 6.4 oz)   05/09/24 120 kg (264 lb)   05/01/24 120 kg (265 lb)     Temp Readings from Last 3 Encounters:   06/04/24 98.2 °F (36.8 °C) (Tympanic)   05/09/24 98.1 °F (36.7 °C) (Temporal)   05/01/24 97.8 °F (36.6 °C) (Tympanic)     BP Readings from Last 3 Encounters:   06/04/24 124/80   05/09/24 136/89   05/01/24 120/80     Pulse Readings from Last 3 Encounters:   05/09/24 71   05/01/24 67   04/11/24 64        PHYSICAL EXAM:     Physical Exam  Vitals reviewed.   Constitutional:       General: He is not in acute distress.     Appearance: He " is well-developed. He is obese.   HENT:      Head: Normocephalic and atraumatic.   Eyes:      Conjunctiva/sclera: Conjunctivae normal.   Cardiovascular:      Rate and Rhythm: Normal rate and regular rhythm.      Heart sounds: No murmur heard.  Pulmonary:      Effort: Pulmonary effort is normal. No respiratory distress.      Breath sounds: Normal breath sounds.   Abdominal:      General: Bowel sounds are normal.      Palpations: Abdomen is soft.      Tenderness: There is no abdominal tenderness.   Musculoskeletal:         General: No swelling or tenderness.      Cervical back: Neck supple.   Skin:     General: Skin is warm and dry.      Capillary Refill: Capillary refill takes less than 2 seconds.   Neurological:      General: No focal deficit present.      Mental Status: He is alert and oriented to person, place, and time.   Psychiatric:         Mood and Affect: Mood normal.         Behavior: Behavior normal.           Lab Results:   Lab Results   Component Value Date    WBC 4.21 (L) 07/10/2023    HGB 14.6 07/10/2023    HCT 42.1 07/10/2023    MCV 87 07/10/2023     07/10/2023       Lab Results   Component Value Date    SODIUM 141 09/28/2023    K 4.3 09/28/2023     09/28/2023    CO2 27 09/28/2023    AGAP 9 09/28/2023    BUN 19 09/28/2023    CREATININE 1.10 09/28/2023    GLUC 80 03/03/2023    GLUF 88 09/28/2023    CALCIUM 9.6 09/28/2023    AST 25 09/28/2023    ALT 36 09/28/2023    ALKPHOS 50 09/28/2023    TP 7.2 09/28/2023    TBILI 0.54 09/28/2023    EGFR 76 09/28/2023     Prior Procedure Results/Reports   Patient underwent colonoscopy 5/28/2024 with Dr. Olivera which showed evidence of a sigmoid hyperplastic polyp, otherwise unremarkable.      Grazyna David PA-C   Available on [employer]

## 2024-06-11 ENCOUNTER — HOSPITAL ENCOUNTER (OUTPATIENT)
Dept: ULTRASOUND IMAGING | Facility: MEDICAL CENTER | Age: 54
Discharge: HOME | End: 2024-06-11
Payer: COMMERCIAL

## 2024-06-11 DIAGNOSIS — R14.0 BLOATING: ICD-10-CM

## 2024-06-11 PROCEDURE — 76700 US EXAM ABDOM COMPLETE: CPT

## 2024-06-12 ENCOUNTER — APPOINTMENT (OUTPATIENT)
Dept: LAB | Age: 54
End: 2024-06-12
Payer: COMMERCIAL

## 2024-06-12 DIAGNOSIS — R14.0 BLOATING: ICD-10-CM

## 2024-06-12 DIAGNOSIS — E78.2 MIXED HYPERLIPIDEMIA: ICD-10-CM

## 2024-06-12 DIAGNOSIS — R63.5 ABNORMAL WEIGHT GAIN: ICD-10-CM

## 2024-06-12 DIAGNOSIS — D72.819 LEUKOPENIA, UNSPECIFIED TYPE: ICD-10-CM

## 2024-06-12 DIAGNOSIS — Z12.5 SCREENING FOR PROSTATE CANCER: ICD-10-CM

## 2024-06-12 LAB
25(OH)D3 SERPL-MCNC: 43.6 NG/ML (ref 30–100)
ALBUMIN SERPL BCP-MCNC: 4.3 G/DL (ref 3.5–5)
ALP SERPL-CCNC: 44 U/L (ref 34–104)
ALT SERPL W P-5'-P-CCNC: 32 U/L (ref 7–52)
ANION GAP SERPL CALCULATED.3IONS-SCNC: 12 MMOL/L (ref 4–13)
AST SERPL W P-5'-P-CCNC: 27 U/L (ref 13–39)
BASOPHILS # BLD AUTO: 0.04 THOUSANDS/ÂΜL (ref 0–0.1)
BASOPHILS NFR BLD AUTO: 1 % (ref 0–1)
BILIRUB SERPL-MCNC: 0.59 MG/DL (ref 0.2–1)
BUN SERPL-MCNC: 18 MG/DL (ref 5–25)
CALCIUM SERPL-MCNC: 9.1 MG/DL (ref 8.4–10.2)
CHLORIDE SERPL-SCNC: 103 MMOL/L (ref 96–108)
CHOLEST SERPL-MCNC: 225 MG/DL
CO2 SERPL-SCNC: 25 MMOL/L (ref 21–32)
CREAT SERPL-MCNC: 1.06 MG/DL (ref 0.6–1.3)
EOSINOPHIL # BLD AUTO: 0.08 THOUSAND/ÂΜL (ref 0–0.61)
EOSINOPHIL NFR BLD AUTO: 2 % (ref 0–6)
ERYTHROCYTE [DISTWIDTH] IN BLOOD BY AUTOMATED COUNT: 12.4 % (ref 11.6–15.1)
GFR SERPL CREATININE-BSD FRML MDRD: 79 ML/MIN/1.73SQ M
GLUCOSE P FAST SERPL-MCNC: 101 MG/DL (ref 65–99)
HCT VFR BLD AUTO: 43.6 % (ref 36.5–49.3)
HDLC SERPL-MCNC: 40 MG/DL
HGB BLD-MCNC: 15 G/DL (ref 12–17)
IGA SERPL-MCNC: 67 MG/DL (ref 66–433)
IMM GRANULOCYTES # BLD AUTO: 0.01 THOUSAND/UL (ref 0–0.2)
IMM GRANULOCYTES NFR BLD AUTO: 0 % (ref 0–2)
LDLC SERPL CALC-MCNC: 135 MG/DL (ref 0–100)
LYMPHOCYTES # BLD AUTO: 2.67 THOUSANDS/ÂΜL (ref 0.6–4.47)
LYMPHOCYTES NFR BLD AUTO: 49 % (ref 14–44)
MCH RBC QN AUTO: 30.1 PG (ref 26.8–34.3)
MCHC RBC AUTO-ENTMCNC: 34.4 G/DL (ref 31.4–37.4)
MCV RBC AUTO: 88 FL (ref 82–98)
MONOCYTES # BLD AUTO: 0.42 THOUSAND/ÂΜL (ref 0.17–1.22)
MONOCYTES NFR BLD AUTO: 8 % (ref 4–12)
NEUTROPHILS # BLD AUTO: 2.13 THOUSANDS/ÂΜL (ref 1.85–7.62)
NEUTS SEG NFR BLD AUTO: 40 % (ref 43–75)
NRBC BLD AUTO-RTO: 0 /100 WBCS
PLATELET # BLD AUTO: 193 THOUSANDS/UL (ref 149–390)
PMV BLD AUTO: 10.5 FL (ref 8.9–12.7)
POTASSIUM SERPL-SCNC: 3.9 MMOL/L (ref 3.5–5.3)
PROT SERPL-MCNC: 6.7 G/DL (ref 6.4–8.4)
PSA SERPL-MCNC: 0.75 NG/ML (ref 0–4)
RBC # BLD AUTO: 4.98 MILLION/UL (ref 3.88–5.62)
SODIUM SERPL-SCNC: 140 MMOL/L (ref 135–147)
TRIGL SERPL-MCNC: 248 MG/DL
TSH SERPL DL<=0.05 MIU/L-ACNC: 1.27 UIU/ML (ref 0.45–4.5)
WBC # BLD AUTO: 5.35 THOUSAND/UL (ref 4.31–10.16)

## 2024-06-12 PROCEDURE — 82784 ASSAY IGA/IGD/IGG/IGM EACH: CPT

## 2024-06-12 PROCEDURE — 84443 ASSAY THYROID STIM HORMONE: CPT

## 2024-06-12 PROCEDURE — G0103 PSA SCREENING: HCPCS

## 2024-06-12 PROCEDURE — 86364 TISS TRNSGLTMNASE EA IG CLAS: CPT

## 2024-06-12 PROCEDURE — 85025 COMPLETE CBC W/AUTO DIFF WBC: CPT

## 2024-06-12 PROCEDURE — 80061 LIPID PANEL: CPT

## 2024-06-13 LAB — TTG IGA SER-ACNC: <2 U/ML (ref 0–3)

## 2024-06-14 ENCOUNTER — APPOINTMENT (OUTPATIENT)
Dept: LAB | Age: 54
End: 2024-06-14
Payer: COMMERCIAL

## 2024-06-14 PROCEDURE — 87338 HPYLORI STOOL AG IA: CPT

## 2024-06-15 LAB — H PYLORI AG STL QL IA: NEGATIVE

## 2024-07-07 ENCOUNTER — HOSPITAL ENCOUNTER (OUTPATIENT)
Dept: SLEEP CENTER | Facility: CLINIC | Age: 54
Discharge: HOME | End: 2024-07-07
Payer: COMMERCIAL

## 2024-07-07 DIAGNOSIS — R29.818 SUSPECTED SLEEP APNEA: ICD-10-CM

## 2024-07-07 PROCEDURE — G0399 HOME SLEEP TEST/TYPE 3 PORTA: HCPCS

## 2024-07-08 NOTE — PROGRESS NOTES
Home Sleep Study Documentation    HOME STUDY DEVICE: Noxturnal no                                           Layne G3 yes      Pre-Sleep Home Study:    Set-up and instructions performed by: Dot Macdonald    Technician performed demonstration for Patient: yes    Return demonstration performed by Patient: yes    Written instructions provided to Patient: yes    Patient signed consent form: yes        Post-Sleep Home Study:    Additional comments by Patient: N/A    Home Sleep Study Failed:no:    Failure reason: N/A    Reported or Detected: N/A    Scored by: LEONIE Reese

## 2024-07-09 PROBLEM — G47.33 OSA (OBSTRUCTIVE SLEEP APNEA): Status: ACTIVE | Noted: 2024-07-09

## 2024-07-11 ENCOUNTER — OFFICE VISIT (OUTPATIENT)
Dept: NEUROLOGY | Facility: CLINIC | Age: 54
End: 2024-07-11
Payer: COMMERCIAL

## 2024-07-11 VITALS
BODY MASS INDEX: 36.12 KG/M2 | WEIGHT: 258 LBS | HEART RATE: 71 BPM | TEMPERATURE: 98.2 F | HEIGHT: 71 IN | SYSTOLIC BLOOD PRESSURE: 135 MMHG | DIASTOLIC BLOOD PRESSURE: 76 MMHG

## 2024-07-11 DIAGNOSIS — G47.33 OSA (OBSTRUCTIVE SLEEP APNEA): Primary | ICD-10-CM

## 2024-07-11 PROCEDURE — 99215 OFFICE O/P EST HI 40 MIN: CPT | Performed by: PSYCHIATRY & NEUROLOGY

## 2024-07-11 NOTE — PATIENT INSTRUCTIONS
Please get your suspected sleep apnea which there was no diagnosis of prior to the accident, which I think was made worse by the accident tested.      Please do follow up with sleep medicine as soon as possible   If you have trouble getting in with sleep medicine please reach out to see if we can help and if it is a very long wait and you want to see if there is anything I can do sooner I can try and order the CPAP titration study just reach out and let me know      Headache/migraine treatment:   Rescue medications (for immediate treatment of a headache):   It is ok to take ibuprofen, acetaminophen or naproxen (Advil, Tylenol,  Aleve, Excedrin) if they help your headaches you should limit these to No more than 3 times a week to avoid medication overuse/rebound headaches.       Over the counter preventive supplements for headaches/migraines (if you try, try for 3 months straight)  (to take every day to help prevent headaches - not to take at the time of headache):  There are combo pills online of these - none of which regulated by FDA and double check dosing - take appropriate dose only once a day- preventa migraine, migravent, mind ease, migrelief   [] Magnesium 400mg daily (If any diarrhea or upset stomach, decrease dose  as tolerated)  [] Riboflavin (Vitamin B2) 400mg daily - try online   (FYI B2 may make your urine bright/neon yellow)  AND/OR  [] Herbal medication: Petasites/Butterbur 150 mg daily - try online  (When choosing your Butterbur online or in the store, beware that there are some poor preps containing pyrrolizidine alkaloids (PAs) that can be harmful to the liver. Therefore, do not use butterbur products that are not labeled as PA-free.)      Prescription preventive medications for headaches/migraines   (to take every day to help prevent headaches - not to take at the time of headache):  [x] We have options         *Typically these types of medications take time until you see the benefit, although some  may see improvement in days, often it may take weeks, especially if the medication is being titrated up to a beneficial level. Please contact us if there are any concerns or questions regarding the medication.     Lifestyle Recommendations:  [x] SLEEP - Maintain a regular sleep schedule: Adults need at least 7-8 hours of uninterrupted a night. Maintain good sleep hygiene:  Going to bed and waking up at consistent times, avoiding excessive daytime naps, avoiding caffeinated beverages in the evening, avoid excessive stimulation in the evening and generally using bed primarily for sleeping.  One hour before bedtime would recommend turning lights down lower, decreasing your activity (may read quietly, listen to music at a low volume). When you get into bed, should eliminate all technology (no texting, emailing, playing with your phone, iPad or tablet in bed).  [x] HYDRATION - Maintain good hydration.  Drink  2L of fluid a day (4 typical small water bottles)  [x] DIET - Maintain good nutrition. In particular don't skip meals and try and eat healthy balanced meals regularly.  [x] TRIGGERS - Look for other triggers and avoid them: Limit caffeine to 1-2 cups a day or less. Avoid dietary triggers that you have noticed bring on your headaches (this could include aged cheese, peanuts, MSG, aspartame and nitrates).  [x] EXERCISE - physical exercise as we all know is good for you in many ways, and not only is good for your heart, but also is beneficial for your mental health, cognitive health and  chronic pain/headaches. I would encourage at the least 5 days of physical exercise weekly for at least 30 minutes.     Education and Follow-up  [x] Please call with any questions or concerns. Of course if any new concerning symptoms go to the emergency department.  [x] Follow up 2-3 months, sooner if needed
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
## 2024-07-30 ENCOUNTER — OFFICE VISIT (OUTPATIENT)
Dept: SLEEP CENTER | Facility: CLINIC | Age: 54
End: 2024-07-30
Payer: COMMERCIAL

## 2024-07-30 VITALS
WEIGHT: 267.8 LBS | OXYGEN SATURATION: 95 % | HEIGHT: 71 IN | HEART RATE: 86 BPM | BODY MASS INDEX: 37.49 KG/M2 | SYSTOLIC BLOOD PRESSURE: 122 MMHG | DIASTOLIC BLOOD PRESSURE: 82 MMHG

## 2024-07-30 DIAGNOSIS — G47.33 OSA (OBSTRUCTIVE SLEEP APNEA): Primary | ICD-10-CM

## 2024-07-30 DIAGNOSIS — I10 PRIMARY HYPERTENSION: ICD-10-CM

## 2024-07-30 DIAGNOSIS — R29.818 SUSPECTED SLEEP APNEA: ICD-10-CM

## 2024-07-30 DIAGNOSIS — F41.9 ANXIETY: ICD-10-CM

## 2024-07-30 PROCEDURE — 99205 OFFICE O/P NEW HI 60 MIN: CPT | Performed by: NURSE PRACTITIONER

## 2024-07-30 PROCEDURE — 3079F DIAST BP 80-89 MM HG: CPT | Performed by: NURSE PRACTITIONER

## 2024-07-30 PROCEDURE — 3074F SYST BP LT 130 MM HG: CPT | Performed by: NURSE PRACTITIONER

## 2024-07-30 RX ORDER — ESCITALOPRAM OXALATE 5 MG/1
10 TABLET ORAL DAILY
Qty: 200 TABLET | Refills: 1 | Status: SHIPPED | OUTPATIENT
Start: 2024-07-30

## 2024-07-30 NOTE — PATIENT INSTRUCTIONS
1. Schedule an appointment for set up of PAP equipment  2.  Begin using your equipment every night  3.  Begin cleaning your equipment, as recommended   4.  Contact your medical equipment distributor regarding any questions concerning your PAP equipment  5.  Contact the [facility] with any other questions, prior to next visit, if needed  6.  Schedule compliance follow-up visit in 31-91 days, as required by insurance         Nursing Support:  When:  Monday through Friday 7:00am-5:00pm, except holidays  Where:  Direct phone line is [phone], option 3  If you are having a true emergency, please call 911.  In the event that the line is busy or it is after hours, please leave a voice mail message and we will return your call.  Please speak clearly, leaving your full name, birth date, best number to reach you and the reason for your call.  Medication refills:  We will need the name of the medication, the dosage, the ordering provider, whether you get a 30 day or 90 day refill and the pharmacy name and address.  Medications will be ordered by the providers only.  Nurses cannot call in prescriptions.    To reach the [facility] office staff:  Call [phone], option 1, followed by option 3
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
## 2024-08-03 ENCOUNTER — TELEPHONE (OUTPATIENT)
Dept: SLEEP CENTER | Facility: CLINIC | Age: 54
End: 2024-08-03

## 2024-09-09 DIAGNOSIS — I10 BENIGN ESSENTIAL HYPERTENSION: ICD-10-CM

## 2024-09-09 RX ORDER — LOSARTAN POTASSIUM AND HYDROCHLOROTHIAZIDE 25; 100 MG/1; MG/1
1 TABLET ORAL DAILY
Qty: 90 TABLET | Refills: 3 | Status: SHIPPED | OUTPATIENT
Start: 2024-09-09

## 2024-09-24 ENCOUNTER — APPOINTMENT (OUTPATIENT)
Dept: URGENT CARE | Age: 54
End: 2024-09-24

## 2024-10-04 ENCOUNTER — TELEPHONE (OUTPATIENT)
Dept: NEUROLOGY | Facility: CLINIC | Age: 54
End: 2024-10-04

## 2024-10-04 NOTE — TELEPHONE ENCOUNTER
Called and spoke to patient and since he is seeing Florida Tinoco there is no reason for him to see Dr. Mejia and he agreed, so I am cancelling his appointment with Dr. Mejia on 10/17/24 at 9:00 am in [facility]

## 2024-11-06 ENCOUNTER — OFFICE VISIT (OUTPATIENT)
Dept: FAMILY MEDICINE CLINIC | Facility: CLINIC | Age: 54
End: 2024-11-06
Payer: COMMERCIAL

## 2024-11-06 VITALS
HEIGHT: 71 IN | DIASTOLIC BLOOD PRESSURE: 70 MMHG | SYSTOLIC BLOOD PRESSURE: 110 MMHG | WEIGHT: 269 LBS | OXYGEN SATURATION: 96 % | HEART RATE: 84 BPM | BODY MASS INDEX: 37.66 KG/M2

## 2024-11-06 DIAGNOSIS — R60.0 LOCALIZED EDEMA: Primary | ICD-10-CM

## 2024-11-06 DIAGNOSIS — I10 BENIGN ESSENTIAL HYPERTENSION: ICD-10-CM

## 2024-11-06 DIAGNOSIS — R53.83 OTHER FATIGUE: ICD-10-CM

## 2024-11-06 PROBLEM — R14.0 ABDOMINAL DISTENSION: Status: RESOLVED | Noted: 2024-05-05 | Resolved: 2024-11-06

## 2024-11-06 PROBLEM — R29.818 SUSPECTED SLEEP APNEA: Status: RESOLVED | Noted: 2024-07-30 | Resolved: 2024-11-06

## 2024-11-06 PROBLEM — S06.0XAA CONCUSSION: Status: RESOLVED | Noted: 2022-02-11 | Resolved: 2024-11-06

## 2024-11-06 PROBLEM — Z01.818 PRE-OP EVALUATION: Status: RESOLVED | Noted: 2023-11-09 | Resolved: 2024-11-06

## 2024-11-06 PROBLEM — S86.119A: Status: RESOLVED | Noted: 2019-04-09 | Resolved: 2024-11-06

## 2024-11-06 LAB
SL AMB  POCT GLUCOSE, UA: NEGATIVE
SL AMB LEUKOCYTE ESTERASE,UA: NEGATIVE
SL AMB POCT BILIRUBIN,UA: NEGATIVE
SL AMB POCT BLOOD,UA: NEGATIVE
SL AMB POCT CLARITY,UA: CLEAR
SL AMB POCT COLOR,UA: YELLOW
SL AMB POCT KETONES,UA: NEGATIVE
SL AMB POCT NITRITE,UA: NEGATIVE
SL AMB POCT PH,UA: 5.5
SL AMB POCT SPECIFIC GRAVITY,UA: 1.02
SL AMB POCT URINE PROTEIN: NEGATIVE
SL AMB POCT UROBILINOGEN: 0.2

## 2024-11-06 PROCEDURE — 99214 OFFICE O/P EST MOD 30 MIN: CPT | Performed by: FAMILY MEDICINE

## 2024-11-06 PROCEDURE — 81002 URINALYSIS NONAUTO W/O SCOPE: CPT | Performed by: FAMILY MEDICINE

## 2024-11-06 RX ORDER — LOSARTAN POTASSIUM 100 MG/1
100 TABLET ORAL DAILY
Qty: 90 TABLET | Refills: 1 | Status: SHIPPED | OUTPATIENT
Start: 2024-11-06

## 2024-11-06 RX ORDER — FUROSEMIDE 20 MG/1
20 TABLET ORAL EVERY OTHER DAY
Qty: 15 TABLET | Refills: 5 | Status: SHIPPED | OUTPATIENT
Start: 2024-11-06

## 2024-11-06 RX ORDER — DILTIAZEM HCL
POWDER (GRAM) MISCELLANEOUS
COMMUNITY
Start: 2024-10-15

## 2024-11-06 NOTE — PROGRESS NOTES
"Ambulatory Visit  Name: Santos Santos      : 1970      MRN: [id number]  Encounter Provider: Summer Ferrell MD  Encounter Date: 2024   Encounter department: [facility] PRIMARY [facility]    Assessment & Plan  Localized edema  Check urine, check bnp and d-dimer (dad  had  blood  clots)    Orders:    POCT urine dip    furosemide (LASIX) 20 mg tablet; Take 1 tablet (20 mg total) by mouth every other day    D-dimer, quantitative; Future    B-Type Natriuretic Peptide(BNP); Future    Benign essential hypertension    Orders:    losartan (COZAAR) 100 MG tablet; Take 1 tablet (100 mg total) by mouth daily    Other fatigue    Orders:    Vitamin B12       History of Present Illness     Patient presents with:  Edema: Retaining water and his feets are swollen. unable to lose weight. Discuss medication, having  foot  pain, concerned a bout  plantar  fasciitis he thinks the losartan HCTZ is not really doing the job he is on 100/25 is wondering if he needs a stronger water pill of note he tells me that his father had a history of blood clots and his brother has neuropathy in his feet patient has also been describing some numbness in his feet he has been a little more tired lately              Review of Systems   Constitutional:  Positive for fatigue. Negative for activity change, appetite change and unexpected weight change.   Respiratory:  Negative for shortness of breath.    Cardiovascular:  Positive for leg swelling. Negative for chest pain.   Musculoskeletal:  Positive for arthralgias.        Foot pain   Neurological:  Positive for numbness. Negative for dizziness, light-headedness and headaches.        Feet   Hematological:  Negative for adenopathy.           Objective     /70 (BP Location: Right arm, Patient Position: Sitting, Cuff Size: Standard)   Pulse 84   Ht 5' 11\" (1.803 m)   Wt 122 kg (269 lb)   SpO2 96%   BMI 37.52 kg/m²     Physical Exam  Vitals reviewed.   Constitutional:       Appearance: " Normal appearance. He is obese.   Cardiovascular:      Rate and Rhythm: Normal rate and regular rhythm.      Pulses: Normal pulses.      Heart sounds: Normal heart sounds.   Pulmonary:      Effort: Pulmonary effort is normal.      Breath sounds: Normal breath sounds.   Musculoskeletal:      Right lower leg: Edema present.      Left lower leg: Edema present.      Comments: 2 plus edema   Lymphadenopathy:      Cervical: No cervical adenopathy.   Neurological:      Mental Status: He is alert.   Psychiatric:         Mood and Affect: Mood normal.

## 2024-11-06 NOTE — ASSESSMENT & PLAN NOTE
Check urine, check bnp and d-dimer (dad  had  blood  clots)    Orders:    POCT urine dip    furosemide (LASIX) 20 mg tablet; Take 1 tablet (20 mg total) by mouth every other day    D-dimer, quantitative; Future    B-Type Natriuretic Peptide(BNP); Future

## 2024-11-07 ENCOUNTER — TELEPHONE (OUTPATIENT)
Dept: SLEEP CENTER | Facility: CLINIC | Age: 54
End: 2024-11-07

## 2024-11-07 NOTE — TELEPHONE ENCOUNTER
From  Santos Santos To  P [facility]rology [facility] (supporting Florida Tinoco MD) Sent  11/6/2024  2:40 PM       I never got called for a machine can someone help me with this.    _________________________________________  [facility] with clinicals [facility] on 8/3/2024 by YOVANA Do MA    Call to [facility] [phone], spoke with Isabella who states they received the order it was sent to [employer].  States their notes state [employer] was unable to get in touch with patient from August through October, several attempts made.     Call to patient who states he may have missed a call from [employer] as he does not answer calls he does not recognize the number.     Patient given [employer] number and he is to call to let them know his order is being resubmitted and he would like to get set up with CPAP.     Compliance appointment rescheduled for 01/09/2025 @ 3:45 pm with ERWIN Dickson at the [facility].

## 2024-11-27 ENCOUNTER — OFFICE VISIT (OUTPATIENT)
Dept: GASTROENTEROLOGY | Facility: CLINIC | Age: 54
End: 2024-11-27
Payer: COMMERCIAL

## 2024-11-27 VITALS
SYSTOLIC BLOOD PRESSURE: 122 MMHG | HEIGHT: 71 IN | BODY MASS INDEX: 38.14 KG/M2 | WEIGHT: 272.4 LBS | DIASTOLIC BLOOD PRESSURE: 80 MMHG | TEMPERATURE: 96.6 F

## 2024-11-27 DIAGNOSIS — K76.0 HEPATIC STEATOSIS: ICD-10-CM

## 2024-11-27 DIAGNOSIS — R14.0 BLOATING: ICD-10-CM

## 2024-11-27 DIAGNOSIS — R63.5 ABNORMAL WEIGHT GAIN: Primary | ICD-10-CM

## 2024-11-27 PROCEDURE — 99213 OFFICE O/P EST LOW 20 MIN: CPT | Performed by: PHYSICIAN ASSISTANT

## 2024-11-27 NOTE — PROGRESS NOTES
Name: Santos Santos      : 1970      MRN: [id number]  Encounter Provider: Grazyna David PA-C  Encounter Date: 2024   Encounter department: [facility] [place]  :  Assessment & Plan  Abnormal weight gain  Will refer to weight management/bariatrics at this time.  Orders:    Ambulatory referral to Weight Management; Future    Hepatic steatosis  I educated patient on hepatic steatosis/fatty liver.  We discussed that hepatic steatosis or fatty liver over time can lead to inflammation in the liver and liver damage and cirrhosis.  I recommended and we discussed the importance of following a low carb high-fiber healthy diet with avoidance of processed foods and artificial sweeteners/sugars, alcohol cessation, and exercise as tolerated to promote slow sustained weight loss as treatment for fatty liver.  I also recommend he completely avoid soda and sugary beverages and drink at least 64 ounces of water per day.  As above. To consider repeat US elastography in 6 months.   Orders:    Ambulatory referral to Weight Management; Future    Bloating  This has improved. No plans for further workup at this time. He does not have any alarming symptoms.          Patient was instructed to call the office with any questions, concerns, new/ worsening/ persisting GI symptoms. Advised patient go to the ER with any severe or worsening abdominal pain, fevers/ chills, intractable N/V, chest pain, SOB, dizziness, lightheadedness, feeling something stuck in esophagus that will not go down. Patient expressed understanding and is in agreement with treatment plan.     Will plan to follow up in 6 months     History of Present Illness     HPI  Santos Santos is a 53 y.o. male with a past medical history of hypertension, allergies, colon polyp, fatty liver, anxiety, BPH, vitamin D deficiency presents to the office today for follow-up.  Patient was last seen in the office by me 2024, previous  office note was reviewed.  At last office visit I ordered a celiac panel, TSH, stool H. pylori.  I also ordered a complete abdominal ultrasound.  Complete abdominal ultrasound was done 6/11/2024, this showed evidence of moderate diffuse hepatic steatosis and a tiny 6 mm right hepatic lobe cyst, otherwise no acute intra-abdominal sonographic abnormality.  No gallstones.  He did undergo an ultrasound elastography 2/15/2023 which I reviewed, this showed absent or mild fibrosis and S2 liver steatosis.  Labs 6/12/2024 reviewed and notable for normal TSH, normal/negative celiac panel.  Stool H. pylori negative    Patients main concern today is ongoing weight gain and inability to lose weight.   He reports significant improvement in bloating since last visit which he is pleased about.  He is use Gas-X over-the-counter occasionally with relief.  He is otherwise eating and drinking well.   Has gained 10 pounds since last office visit.  Patient denies any fevers/ chills, unintentional weight loss, decreased appetite, abdominal pain, nausea, vomiting, change in bowel habits, diarrhea, constipation, black stools, chronic heartburn, acid reflux, dysphagia, odynophagia.   Denies chest pain, SOB    He underwent CT abdomen and pelvis with IV contrast in January 2023 for right lower quadrant abdominal pain, abdominal distention, abnormal weight gain, this was reviewed, this showed no acute abdominal or pelvic pathology.  Patient underwent colonoscopy 5/28/2024 with Dr. Olivera which showed evidence of a sigmoid hyperplastic polyp, otherwise unremarkable.     Review of Systems   Constitutional:  Negative for chills and fever.   HENT:  Negative for ear pain and sore throat.    Eyes:  Negative for pain and visual disturbance.   Respiratory:  Negative for cough and shortness of breath.    Cardiovascular:  Negative for chest pain and palpitations.   Gastrointestinal:  Negative for abdominal pain and vomiting.   Genitourinary:  Negative for  "dysuria and hematuria.   Musculoskeletal:  Negative for arthralgias and back pain.   Skin:  Negative for color change and rash.   Neurological:  Negative for seizures and syncope.   All other systems reviewed and are negative.    Objective   /80   Temp (!) 96.6 °F (35.9 °C)   Ht 5' 11\" (1.803 m)   Wt 124 kg (272 lb 6.4 oz)   BMI 37.99 kg/m²      Physical Exam  Vitals reviewed.   Constitutional:       General: He is not in acute distress.     Appearance: He is well-developed. He is not ill-appearing or diaphoretic.   HENT:      Head: Normocephalic and atraumatic.   Eyes:      Conjunctiva/sclera: Conjunctivae normal.   Cardiovascular:      Rate and Rhythm: Normal rate and regular rhythm.      Heart sounds: No murmur heard.  Pulmonary:      Effort: Pulmonary effort is normal. No respiratory distress.      Breath sounds: Normal breath sounds.   Abdominal:      General: Bowel sounds are normal.      Palpations: Abdomen is soft.      Tenderness: There is no abdominal tenderness.   Musculoskeletal:         General: No swelling.      Cervical back: Neck supple.   Skin:     General: Skin is warm and dry.      Capillary Refill: Capillary refill takes less than 2 seconds.   Neurological:      General: No focal deficit present.      Mental Status: He is alert and oriented to person, place, and time.   Psychiatric:         Mood and Affect: Mood normal.         Behavior: Behavior normal.     Lab Results   Component Value Date    WBC 5.35 06/12/2024    HGB 15.0 06/12/2024    HCT 43.6 06/12/2024    MCV 88 06/12/2024     06/12/2024     Lab Results   Component Value Date    SODIUM 140 06/12/2024    K 3.9 06/12/2024     06/12/2024    CO2 25 06/12/2024    AGAP 12 06/12/2024    BUN 18 06/12/2024    CREATININE 1.06 06/12/2024    GLUC 80 03/03/2023    GLUF 101 (H) 06/12/2024    CALCIUM 9.1 06/12/2024    AST 27 06/12/2024    ALT 32 06/12/2024    ALKPHOS 44 06/12/2024    TP 6.7 06/12/2024    TBILI 0.59 06/12/2024    " EGFR 79 06/12/2024

## 2024-11-29 DIAGNOSIS — R60.0 LOCALIZED EDEMA: ICD-10-CM

## 2024-11-29 RX ORDER — FUROSEMIDE 20 MG/1
20 TABLET ORAL EVERY OTHER DAY
Qty: 45 TABLET | Refills: 1 | Status: SHIPPED | OUTPATIENT
Start: 2024-11-29

## 2024-12-04 DIAGNOSIS — F41.9 ANXIETY: ICD-10-CM

## 2024-12-05 RX ORDER — ESCITALOPRAM OXALATE 5 MG/1
10 TABLET ORAL DAILY
Qty: 200 TABLET | Refills: 1 | Status: SHIPPED | OUTPATIENT
Start: 2024-12-05

## 2024-12-12 ENCOUNTER — TRANSCRIBE ORDERS (OUTPATIENT)
Dept: GASTROENTEROLOGY | Facility: CLINIC | Age: 54
End: 2024-12-12

## 2025-01-13 ENCOUNTER — APPOINTMENT (OUTPATIENT)
Dept: URGENT CARE | Age: 55
End: 2025-01-13

## 2025-02-15 NOTE — LETTER
2024    Nicholas Patrick MD  [address] [address]    Patient: Santos Santos   YOB: 1970   Date of Visit: 2024     Encounter Diagnosis     ICD-10-CM    1. Tear of left meniscus as current injury, subsequent encounter  S83.207D           Dear Dr. Patrick:    Thank you for your recent referral of Santos Santos. Please review the attached evaluation summary from Santos's recent visit.     Please verify that you agree with the plan of care by signing the attached order.     If you have any questions or concerns, please do not hesitate to call.     I sincerely appreciate the opportunity to share in the care of one of your patients and hope to have another opportunity to work with you in the near future.       Sincerely,    Valeriano Cottrell, PT      Referring Provider:      I certify that I have read the below Plan of Care and certify the need for these services furnished under this plan of treatment while under my care.                    Nicholas Patrick MD  [address] [address]  Via Fax: [phone]          PT Evaluation     Today's date: 2024  Patient name: Santos Santos  : 1970  MRN: [id number]  Referring provider: Nicholas Patrick*  Dx:   Encounter Diagnosis     ICD-10-CM    1. Tear of left meniscus as current injury, subsequent encounter  S83.207D                      Assessment  Assessment details: Santos Santos is a 53 y.o. year old male presenting to PT with pain, decreased range of motion, decreased strength, and decreased tolerance to activity. Signs and symptoms are consistent with referring diagnosis of L medial menisectomy.  The existing impairments result in difficulty with end range knee flexion, walking and functional mobility. Santos would benefit from skilled PT services to address these issues and to maximize function.  Home exercise provided and all questions answered. Thank you for the  referral.    Impairments: abnormal or restricted ROM, activity intolerance and pain with function    Goals  STG 2-4 weeks  Achieve full knee ext and 120 deg left knee flexion  Decrease pain to <5/10 with activity  Increase standing/walking tolerance to >30 minutes  Patient independent with HEP    LTG 6-8 weeks  Increase BLE strength 20#  Decrease pain to <2/10 with activity  Increase single leg stance >30 seconds  Increase standing/walking tolerance to >90 minutes  Return to all work and recreation without activity    Plan  Planned therapy interventions: joint mobilization, neuromuscular re-education, strengthening, stretching, therapeutic activities and therapeutic exercise  Frequency: 2x week  Duration in weeks: 6    Subjective Evaluation    History of Present Illness  Mechanism of injury: Santos reports insidious onset of L medial meniscus tear that has been bothering him for 9-12 months.  He underwent knee scope last week and has been consistent with HEP.      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 He is also very active and plans to return to the gym as well as various outdoor activities.   Patient Goals  Patient goals for therapy: decreased pain, increased motion, improved balance, return to sport/leisure activities, return to work, independence with ADLs/IADLs and increased strength    Pain  Current pain rating: 3  At best pain rating: 3  At worst pain rating: 3  Quality: tight and pressure  Relieving factors: support  Aggravating factors: walking, standing and running  Progression: no change    Treatments  Current treatment: physical therapy    Objective     Tenderness   Left Knee   Tenderness in the inferior patella, quadriceps tendon and superior patella.     Passive Range of Motion   Left Knee   Flexion: 105 degrees   Extension: 0 degrees     Strength/Myotome Testing     Left Knee   Flexion: 4  Extension: 4    Swelling      Left Knee Girth Measurement (cm)   Joint line: 43.8 cm    Right Knee Girth Measurement (cm)   Joint line: 42.8 cm           Precautions: Menisectomy      Manuals 1/2            Patella mobs prn                                                   Neuro Re-Ed             Heel slides HEP            Ankle pump HEP            Quad Sets nv            SLR nv            Func HR nv                                      Ther Ex             Bike 10'                                                                                                       Ther Activity                                       Gait Training                                       Modalities                                                              69

## 2025-02-25 ENCOUNTER — OFFICE VISIT (OUTPATIENT)
Dept: FAMILY MEDICINE CLINIC | Facility: CLINIC | Age: 55
End: 2025-02-25
Payer: COMMERCIAL

## 2025-02-25 VITALS
DIASTOLIC BLOOD PRESSURE: 78 MMHG | HEART RATE: 75 BPM | OXYGEN SATURATION: 99 % | BODY MASS INDEX: 39.34 KG/M2 | SYSTOLIC BLOOD PRESSURE: 112 MMHG | WEIGHT: 281 LBS | HEIGHT: 71 IN

## 2025-02-25 DIAGNOSIS — Z12.5 SCREENING FOR PROSTATE CANCER: ICD-10-CM

## 2025-02-25 DIAGNOSIS — I10 PRIMARY HYPERTENSION: Primary | ICD-10-CM

## 2025-02-25 DIAGNOSIS — M25.512 ACUTE PAIN OF LEFT SHOULDER: ICD-10-CM

## 2025-02-25 DIAGNOSIS — E78.2 MIXED HYPERLIPIDEMIA: ICD-10-CM
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
## 2025-02-25 DIAGNOSIS — M25.522 LEFT ELBOW PAIN: ICD-10-CM

## 2025-02-25 PROCEDURE — 99214 OFFICE O/P EST MOD 30 MIN: CPT | Performed by: NURSE PRACTITIONER

## 2025-02-25 RX ORDER — PREDNISONE 50 MG/1
TABLET ORAL DAILY
Status: CANCELLED | OUTPATIENT
Start: 2025-02-25

## 2025-02-25 RX ORDER — PREDNISONE 10 MG/1
TABLET ORAL
Qty: 15 TABLET | Refills: 0 | Status: SHIPPED | OUTPATIENT
Start: 2025-02-25 | End: 2025-03-02

## 2025-02-25 RX ORDER — MELOXICAM 15 MG/1
15 TABLET ORAL DAILY
Qty: 30 TABLET | Refills: 0 | Status: SHIPPED | OUTPATIENT
Start: 2025-02-25

## 2025-02-25 NOTE — PROGRESS NOTES
Name: Santos Santos      : 1970      MRN: [id number]  Encounter Provider: ERWIN Sullivan  Encounter Date: 2025   Encounter department: [facility]  :  Assessment & Plan  Primary hypertension  Patient blood pressure is doing well  He is overdue for labs  He is to continue on losartan 100mg, metoprolol 25mg and lasix every other day   Orders:  •  TSH, 3rd generation with Free T4 reflex; Future  •  Comprehensive metabolic panel    Mixed hyperlipidemia  Patient is not on medications for his cholesterol but already agreed upon by cardiology that based on his calcium score not needed.   Follow up labs needed  Orders:  •  Lipid Panel with Direct LDL reflex; Future    Acute pain of left shoulder  Due to nature of fall. Recommend xray to rule out hairline fracture  Suspect more msk related  We can do steroids and nsaids- refer to PT if  needed    Orders:  •  XR shoulder 2+ vw left; Future  •  Ambulatory Referral to Physical Therapy; Future  •  predniSONE 10 mg tablet; Take 5 tablets (50 mg total) by mouth daily for 1 day, THEN 4 tablets (40 mg total) daily for 1 day, THEN 3 tablets (30 mg total) daily for 1 day, THEN 2 tablets (20 mg total) daily for 1 day, THEN 1 tablet (10 mg total) daily for 1 day.  •  Ambulatory Referral to Orthopedic Surgery; Future    Left elbow pain  Secondary to fall  Recommend xray  Orders:  •  meloxicam (Mobic) 15 mg tablet; Take 1 tablet (15 mg total) by mouth daily  •  Ambulatory Referral to Physical Therapy; Future  •  XR elbow 2 vw left; Future  •  predniSONE 10 mg tablet; Take 5 tablets (50 mg total) by mouth daily for 1 day, THEN 4 tablets (40 mg total) daily for 1 day, THEN 3 tablets (30 mg total) daily for 1 day, THEN 2 tablets (20 mg total) daily for 1 day, THEN 1 tablet (10 mg total) daily for 1 day.  •  Ambulatory Referral to Orthopedic Surgery; Future    Fall from slip, trip, or stumble, initial encounter    Orders:  •  XR shoulder 2+ vw left;  "Future  •  meloxicam (Mobic) 15 mg tablet; Take 1 tablet (15 mg total) by mouth daily  •  Ambulatory Referral to Physical Therapy; Future  •  XR elbow 2 vw left; Future  •  Ambulatory Referral to Orthopedic Surgery; Future    Screening for prostate cancer    Orders:  •  PSA, Total Screen; Future           History of Present Illness   Pt presents today for episodic visit following fall. Pt states he slipped on ice and fell on L side 5 days ago. Pain in L shoulder and some in L elbow and wrist. Tried naproxen for pain with minimal relief. The pain is 6/10 and most prominent when trying to lift. There was bruising that is improving.     Fall  The accident occurred 5 to 7 days ago. The fall occurred while walking. He fell from a height of 1 to 2 ft. He landed on [place]. There was no blood loss. The point of impact was the left shoulder, left elbow and left wrist. The pain is present in the left shoulder. The pain is at a severity of 6/10. The pain is moderate. The symptoms are aggravated by use of injured limb, rotation and extension. Pertinent negatives include no numbness or tingling. He has tried NSAID for the symptoms. The treatment provided mild relief.     Review of Systems   Constitutional: Negative.    HENT: Negative.     Respiratory: Negative.     Cardiovascular: Negative.    Gastrointestinal: Negative.    Genitourinary: Negative.    Musculoskeletal:  Negative for joint swelling.   Skin: Negative.    Neurological:  Negative for tingling and numbness.       Objective   /78 (BP Location: Left arm, Patient Position: Sitting, Cuff Size: Standard)   Pulse 75   Ht 5' 11\" (1.803 m)   Wt 127 kg (281 lb)   SpO2 99%   BMI 39.19 kg/m²      Physical Exam  Vitals and nursing note reviewed.   Constitutional:       General: He is not in acute distress.     Appearance: Normal appearance. He is obese. He is not ill-appearing, toxic-appearing or diaphoretic.   HENT:      Head: Normocephalic and atraumatic.      " Right Ear: External ear normal.      Left Ear: External ear normal.   Eyes:      Extraocular Movements: Extraocular movements intact.      Conjunctiva/sclera: Conjunctivae normal.   Cardiovascular:      Rate and Rhythm: Normal rate and regular rhythm.      Pulses: Normal pulses.      Heart sounds: Normal heart sounds, S1 normal and S2 normal. No murmur heard.  Pulmonary:      Effort: Pulmonary effort is normal. No respiratory distress.      Breath sounds: Normal breath sounds. No wheezing.   Musculoskeletal:         General: Tenderness present.      Left shoulder: Tenderness present. No swelling or crepitus. Normal range of motion. Normal strength.      Left elbow: No swelling. Normal range of motion. Tenderness present.      Left wrist: Tenderness present. No swelling or crepitus. Normal range of motion.   Neurological:      Mental Status: He is alert and oriented to person, place, and time.   Psychiatric:         Mood and Affect: Mood normal.         Behavior: Behavior normal.         Thought Content: Thought content normal.         Judgment: Judgment normal.

## 2025-02-25 NOTE — PROGRESS NOTES
Name: Santos Santos      : 1970      MRN: [id number]  Encounter Provider: ERWIN Sullivan  Encounter Date: 2025   Encounter department: [facility]  :  Assessment & Plan  Primary hypertension    Orders:  •  TSH, 3rd generation with Free T4 reflex; Future  •  Comprehensive metabolic panel    Mixed hyperlipidemia    Orders:  •  Lipid Panel with Direct LDL reflex; Future    Acute pain of left shoulder    Orders:  •  XR shoulder 2+ vw left; Future  •  Ambulatory Referral to Physical Therapy; Future  •  predniSONE 10 mg tablet; Take 5 tablets (50 mg total) by mouth daily for 1 day, THEN 4 tablets (40 mg total) daily for 1 day, THEN 3 tablets (30 mg total) daily for 1 day, THEN 2 tablets (20 mg total) daily for 1 day, THEN 1 tablet (10 mg total) daily for 1 day.  •  Ambulatory Referral to Orthopedic Surgery; Future    Left elbow pain    Orders:  •  meloxicam (Mobic) 15 mg tablet; Take 1 tablet (15 mg total) by mouth daily  •  Ambulatory Referral to Physical Therapy; Future  •  XR elbow 2 vw left; Future  •  predniSONE 10 mg tablet; Take 5 tablets (50 mg total) by mouth daily for 1 day, THEN 4 tablets (40 mg total) daily for 1 day, THEN 3 tablets (30 mg total) daily for 1 day, THEN 2 tablets (20 mg total) daily for 1 day, THEN 1 tablet (10 mg total) daily for 1 day.  •  Ambulatory Referral to Orthopedic Surgery; Future    Fall from slip, trip, or stumble, initial encounter    Orders:  •  XR shoulder 2+ vw left; Future  •  meloxicam (Mobic) 15 mg tablet; Take 1 tablet (15 mg total) by mouth daily  •  Ambulatory Referral to Physical Therapy; Future  •  XR elbow 2 vw left; Future  •  Ambulatory Referral to Orthopedic Surgery; Future    Screening for prostate cancer    Orders:  •  PSA, Total Screen; Future           History of Present Illness {?Quick Links Encounters * My Last Note * Last Note in Specialty * Snapshot * Since Last Visit * History :63100}  HPI  Review of  "Systems    Objective {?Quick Links Trend Vitals * Enter New Vitals * Results Review * Timeline (Adult) * Labs * Imaging * Cardiology * Procedures * Lung Cancer Screening * Surgical eConsent :60990}  /78 (BP Location: Left arm, Patient Position: Sitting, Cuff Size: Standard)   Pulse 75   Ht 5' 11\" (1.803 m)   Wt 127 kg (281 lb)   SpO2 99%   BMI 39.19 kg/m²      Physical Exam  {Administrative / Billing Section (Optional):41371}  "

## 2025-02-28 ENCOUNTER — HOSPITAL ENCOUNTER (OUTPATIENT)
Dept: RADIOLOGY | Facility: HOSPITAL | Age: 55
Discharge: HOME | End: 2025-02-28
Payer: COMMERCIAL
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
## 2025-02-28 DIAGNOSIS — M25.522 LEFT ELBOW PAIN: ICD-10-CM

## 2025-02-28 DIAGNOSIS — M25.512 ACUTE PAIN OF LEFT SHOULDER: ICD-10-CM

## 2025-02-28 PROCEDURE — 73070 X-RAY EXAM OF ELBOW: CPT

## 2025-02-28 PROCEDURE — 73030 X-RAY EXAM OF SHOULDER: CPT

## 2025-03-03 ENCOUNTER — RESULTS FOLLOW-UP (OUTPATIENT)
Dept: FAMILY MEDICINE CLINIC | Facility: CLINIC | Age: 55
End: 2025-03-03

## 2025-03-03 NOTE — ASSESSMENT & PLAN NOTE
Orders:  •  meloxicam (Mobic) 15 mg tablet; Take 1 tablet (15 mg total) by mouth daily  •  Ambulatory Referral to Physical Therapy; Future  •  XR elbow 2 vw left; Future  •  predniSONE 10 mg tablet; Take 5 tablets (50 mg total) by mouth daily for 1 day, THEN 4 tablets (40 mg total) daily for 1 day, THEN 3 tablets (30 mg total) daily for 1 day, THEN 2 tablets (20 mg total) daily for 1 day, THEN 1 tablet (10 mg total) daily for 1 day.  •  Ambulatory Referral to Orthopedic Surgery; Future

## 2025-03-03 NOTE — ASSESSMENT & PLAN NOTE
Secondary to fall  Recommend xray  Orders:  •  meloxicam (Mobic) 15 mg tablet; Take 1 tablet (15 mg total) by mouth daily  •  Ambulatory Referral to Physical Therapy; Future  •  XR elbow 2 vw left; Future  •  predniSONE 10 mg tablet; Take 5 tablets (50 mg total) by mouth daily for 1 day, THEN 4 tablets (40 mg total) daily for 1 day, THEN 3 tablets (30 mg total) daily for 1 day, THEN 2 tablets (20 mg total) daily for 1 day, THEN 1 tablet (10 mg total) daily for 1 day.  •  Ambulatory Referral to Orthopedic Surgery; Future

## 2025-03-03 NOTE — ASSESSMENT & PLAN NOTE
Patient blood pressure is doing well  He is overdue for labs  He is to continue on losartan 100mg, metoprolol 25mg and lasix every other day   Orders:  •  TSH, 3rd generation with Free T4 reflex; Future  •  Comprehensive metabolic panel

## 2025-03-03 NOTE — ASSESSMENT & PLAN NOTE
Patient is not on medications for his cholesterol but already agreed upon by cardiology that based on his calcium score not needed.   Follow up labs needed  Orders:  •  Lipid Panel with Direct LDL reflex; Future

## 2025-03-05 ENCOUNTER — OFFICE VISIT (OUTPATIENT)
Dept: OBGYN CLINIC | Facility: MEDICAL CENTER | Age: 55
End: 2025-03-05
Payer: COMMERCIAL

## 2025-03-05 VITALS — WEIGHT: 281 LBS | HEIGHT: 71 IN | BODY MASS INDEX: 39.34 KG/M2

## 2025-03-05 DIAGNOSIS — M25.522 LEFT ELBOW PAIN: ICD-10-CM

## 2025-03-05 DIAGNOSIS — M72.2 PLANTAR FASCIITIS: ICD-10-CM

## 2025-03-05 DIAGNOSIS — M25.512 ACUTE PAIN OF LEFT SHOULDER: Primary | ICD-10-CM
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
## 2025-03-05 PROCEDURE — 99204 OFFICE O/P NEW MOD 45 MIN: CPT | Performed by: EMERGENCY MEDICINE

## 2025-03-05 NOTE — PATIENT INSTRUCTIONS
You may use Advil (ibuprofen) 400-600mg every 6 hours or at least twice per day (OR Aleve (naproxen) 250-500mg every 12 hours as needed for pain and inflammation).  However do not mix or take other NSAIDs together such as Advil, Motrin, ibuprofen, Celebrex, naproxen, diclofenac or Aleve.    You may also take Tylenol (acetaminophen) together with Advil (ibuprofen) or Aleve (naproxen) as this is safe and can help decrease your pain levels.  The dosing for Tylenol is 500mg every 4-6 hours as needed OR max 1,000mg per dose up to 3 times per day for a total of 3,000mg per day  *Check with your primary care physician to see if these medications are safe to take and to make sure they do not interfere with your other medications and medical issues.      OR    While taking Mobic (meloxicam) do not take any other NSAIDs such as Advil, Motrin, ibuprofen, Celebrex, diclofenac, naproxen or Aleve.  However you may take Tylenol (acetaminophen).  You may also take Tylenol 500mg every 4-6 hours as needed OR max 1,000mg per dose up to 3 times per day for a total of 3,000mg per day      Rotator Cuff Exercises     About this topic   A rotator cuff tear is a problem that can limit how much you can move your shoulder. It can also be painful. The rotator cuff is a group of muscles and tendons in your shoulder. It helps your shoulder move and be steady. These muscles help to rotate and lift the arm. Tendons are strong bands that connect muscles to bones. You can tear a tendon in your shoulder if you fall or move your shoulder too fast and with too much force. Your tendon can also wear out over time and tear. Large tears may require surgery. For small tears, exercises may help make this problem better.  General   Before starting with a program, ask your doctor if you are healthy enough to do these exercises. Your doctor may have you work with a  or physical therapist to make a safe exercise program to meet your needs. You should not  do the exercises if they cause sharp pains. You may need to start out with easy exercises at first. Then, once your shoulder is feeling better, you may start the harder exercises.  Passive Exercises:   You use the movement of your body to move your arm. Do NOT use your shoulder muscles to move your arm.  Pendulum exercises ? Hold onto a table with one hand and bend forward at the waist until your back is level with the table. Let your sore arm hang in front of you. Do each exercise for 1 minute.  Circles ? Move your body in large circles one way. After you move a few times, your arm should start gently moving in circles. Now, move your body in circles the other way until your arm moves the other way.  Swinging side-to-side ? Move your body side to side. After you move a few times, your arm should start gently moving side-to-side.  Swinging back and forth ? Move your body forwards and then backwards. After you move a few times, your arm should start gently moving back and forth.  Stretching Exercises   Stretching exercises keep your muscles flexible. They also stop them from getting tight. Start by doing each of these stretches 2 to 3 times. In order for your body to make changes, you will need to hold these stretches for 20 to 30 seconds. Try to do the stretches 2 to 3 times each day. Do all exercises slowly.  Strengthening Exercises   Strengthening exercises keep your muscles firm and strong. Start by repeating each exercise 2 to 3 times. Work up to doing each exercise 10 times. Try to do the exercises 2 to 3 times each day. Do all exercises slowly.  Shoulder blade squeezes ? Pinch your shoulder blades together on your upper back and hold 3 to 5 seconds. Relax.  Cane rehab exercises:  Overhead flexions ? Lie down and grab the cane with both hands. Start with your arms straight and the cane resting on your upper legs. Keep your arms straight and lift the cane over your head.  Abductions or side-to-side motion ?  Stand and grab the cane with both hands. Turn your thumbs to the left to stretch your left shoulder. Start with your arms straight and the cane resting on your upper legs. Push the cane to the left, raising your left arm. Keep your left elbow straight. Keep pushing until you feel a good stretch. Switch the position of your hands to point your thumbs to the right and push the cane right to stretch your right shoulder.  External rotations or side-to-side rotations ? Lie down and grab the cane with both hands. Start with your elbows bent and touching your body. Put the tip of the cane in the palm of your right hand to stretch your right shoulder. Grab the cane at the other end with your left hand. Push the cane sideways into your right palm until you feel a stretch in your shoulder. Be sure to keep your right elbow close to your body while you are stretching. Switch hands to stretch the left shoulder.  Internal rotations ? Stand up and grab the cane with both hands behind your back. With your palms facing backwards, slide the cane up your back. Go until you feel a good stretch in front of the shoulder.  Shoulder blade exercises ? Lie on your stomach near the edge of a mat or bed or supported on an exercise ball. Start with your arms hanging down in a relaxed position.  Y position ? Raise your arms up and to the sides so your elbows are near your ears and your arms are straight out diagonally like the letter Y. Lower the arms back to the starting position.  T position ? Raise your arms straight out to the sides, even with your shoulders. Lower the arms back to the starting position.  W position ? Raise your arms up and to the sides with your elbows bent. Your hands should be just above your shoulders and looks like a W from above. Lower the arms back to the starting position.  L position ? Keep your elbows at your sides and raise just your lower arms out to the side to make a letter L and a backwards letter L. Lower the  "arms back to the starting position.  Shoulder exercises ? Tie a knot in an exercise band. Secure the band in a door by shutting it in around waist level. Wrap the band around one hand for a good . Stand away from the door enough to have slight tension in the band. As the exercises get easier, you may need to change to a heavier band. Moving closer to, or farther away from, the point where the band is tied down will decrease or increase the tension in the band.  Internal rotations ? Face sideways with the arm holding the band closest to the door. Bend the elbow to 90 degrees or in an \"L\" position. Keeping your elbow by your side and bent, pull the band across your body. Bring it back to the starting position. Repeat with the other arm.  External rotations ? Face sideways with the arm holding the band farthest from the door. Bend the elbow to 90 degrees or in an \"L\" position. Keeping your elbow by your side and bent, pull the band away from your body. Bring it back to the starting position. Repeat with the other arm.  Abductions ? Face sideways with the arm holding the band farthest from the door. Be sure that your thumb is facing upward. Keep your elbow straight and pull the band out to the side and away from your body. Go up to shoulder level. Bring it back to the starting position. Repeat with the other arm.  Flexions ? Face away from the door. Keeping your elbow straight, pull the band straight out in front of you. Bring it back to the starting position. Repeat with the other arm.                  What will the results be?   Less pain  More strength  Able to move your arm more  Easier to do daily activities  Shoulder is more stable  Prevent re-injury  Helpful tips   Stay active and work out to keep your muscles strong and flexible.  Eat a healthy diet to keep your muscles healthy.  Be sure you do not hold your breath when exercising. This can raise your blood pressure. If you tend to hold your breath, try " counting out loud when exercising. If any exercise bothers you, stop right away.  Always warm up before stretching. Heated muscles stretch much easier than cool muscles. Stretching cool muscles can lead to injury.  Try swinging your arms at an easy pace for a few minutes to warm up your muscles. Do this again after exercising.  Never bounce when doing stretches.  After exercising, it is a good idea to use ice. Place an ice pack or a bag of frozen peas wrapped in a towel over the painful part. Never put ice right on the skin. Do not leave the ice on more than 10 to 15 minutes at a time. Ice after activity may help decrease pain and swelling. Never ice before stretching.  Doing exercises before a meal may be a good way to get into a routine.  Exercise may be slightly uncomfortable, but you should not have sharp pains. If you do get sharp pains, stop what you are doing. If the sharp pains continue, call your doctor.  Last Reviewed Date   2020-03-10  Consumer Information Use and Disclaimer   This generalized information is a limited summary of diagnosis, treatment, and/or medication information. It is not meant to be comprehensive and should be used as a tool to help the user understand and/or assess potential diagnostic and treatment options. It does NOT include all information about conditions, treatments, medications, side effects, or risks that may apply to a specific patient. It is not intended to be medical advice or a substitute for the medical advice, diagnosis, or treatment of a health care provider based on the health care provider's examination and assessment of a patient’s specific and unique circumstances. Patients must speak with a health care provider for complete information about their health, medical questions, and treatment options, including any risks or benefits regarding use of medications. This information does not endorse any treatments or medications as safe, effective, or approved for treating  a specific patient. [employer] and its affiliates disclaim any warranty or liability relating to this information or the use thereof. The use of this information is governed by the Terms of Use, available at https://www.[employer].com/en/know/clinical-effectiveness-terms   Copyright   Copyright © 2024 [employer] and its affiliates and/or licensors. All rights reserved.      Patient Education     Plantar Fasciitis Exercises   About this topic   Plantar fasciitis is swelling of the thick tissue on the bottom of the foot. This tissue is called the plantar fascia. It connects the heel bone to the toes and supports the arch of the foot. Exercise is an important part of making this problem better.  General   Before starting with a program, ask your doctor if you are healthy enough to do these exercises. Your doctor may have you work with a  or physical therapist to make a safe exercise program to meet your needs.  Stretching Exercises   Stretching exercises keep your muscles flexible. They also stop them from getting tight. Start by doing each of these stretches 2 to 3 times. In order for your body to make changes, you will need to hold these stretches for 20 to 30 seconds. Try to do the stretches 2 to 3 times each day. Do all exercises slowly.  Calf stretches standing ? Stand about 12 to 18 inches (30 to 45 cm) away from a wall. Place your hands on the wall at shoulder level. Lean forward.  Knee straight - Stretch your left leg straight behind you. Make sure the left heel is flat on the floor and the left knee is straight. Now, bend the knee of the right leg until you feel a stretch in your left calf. Be sure that the heel does not come up. Repeat on the other side.  Knee bent - Take a small step forward with your left leg so your feet are slightly closer together. With your right leg bent, bend your left knee forward until you feel a stretch in the back of the calf of your left leg. This will feel  strange, but it is the best way to stretch this calf muscle. Repeat on the other side.  Calf stretches lying down with belt or towel ? Lie on your back with both legs straight. Loop a belt or towel around the ball of one foot. Lift the leg up, keeping the knee straight until you feel a stretch in the back of your thigh. Pull back on the belt or towel to bend your foot more for a better stretch. Repeat on the other foot. This stretch gets both your calf and the hamstrings on the back of your thigh.  Calf stretches on stairs ? Stand on a step and hold onto a rail. Position your feet so only the balls of your feet are on the step. Lower both heels until you feel a stretch in the back of your calves. Do not do this stretch if you have trouble with balance.  Crossed leg foot stretches ? Sit in a chair. Bend one leg up and rest the outside of the foot on the knee. Grab your foot and pull your toes and foot forward until you feel a stretch along the bottom of your foot. Repeat with the other foot.  Rolling foot ? Use a golf ball, tennis ball, soup can, a frozen water bottle, or a rolling pin for this exercise. Sit in a sturdy chair. Put the ball, can, or rolling pin underneath your sore foot. Push down firmly and roll it back and forth with your foot for 1 to 2 minutes. Work up to 3 to 4 minutes. If this exercise is too easy, try doing it while standing up with more pressure on the foot. Do this exercise last if you use a frozen water bottle. Heated tissue stretches better than cold tissue. Doing this last with a frozen water bottle can help lessen the pain and swelling that may happen with stretching.  Strengthening Exercises   Strengthening exercises keep your muscles firm and strong. Start by repeating each exercise 2 to 3 times. Work up to doing each exercise 10 times. Try to do the exercises 2 to 3 times each day. Do all exercises slowly.  Towel pick-ups ? Sit in a chair with your feet flat on the floor. Make sure you  do not have shoes or socks on your feet. Place a towel under one foot with one end of the towel lined up with your heel. Keep your heel on the floor and try grabbing the towel with your toes. Gradually work it back until there is no more towel to move. Now, try pushing the towel back out while keeping your heel still. You can make this harder by putting a small weight on the end of the towel.               What will the results be?   Less pain  Less pulling  Less swelling  Better flexibility and range of motion  Easier to walk and do other activities  Helpful tips   Stay active and work out to keep your muscles strong and flexible.  Keep a healthy weight to avoid putting too much stress on your joints. Eat a healthy diet to keep your muscles healthy.  Be sure you do not hold your breath when exercising. This can raise your blood pressure. If you tend to hold your breath, try counting out loud when exercising. If any exercise bothers you, stop right away.  Always warm up before stretching. Heated muscles stretch much easier than cool muscles. Stretching cool muscles can lead to injury.  Try walking or cycling at an easy pace for a few minutes to warm up your muscles. Do this again after exercising.  Never bounce when doing stretches.  Doing exercises before a meal may be a good way to get into a routine.  After exercising, it is a good idea to ice the bottom of your foot. A good way to do this is by sitting in a chair and rolling a frozen water bottle back and forth under your foot. Do not do this longer than 15 to 20 minutes.  Exercise may be slightly uncomfortable, but you should not have sharp pains. If you do get sharp pains, stop what you are doing. If the sharp pains continue, call your doctor.  Last Reviewed Date   2021-05-19  Consumer Information Use and Disclaimer   This generalized information is a limited summary of diagnosis, treatment, and/or medication information. It is not meant to be comprehensive  and should be used as a tool to help the user understand and/or assess potential diagnostic and treatment options. It does NOT include all information about conditions, treatments, medications, side effects, or risks that may apply to a specific patient. It is not intended to be medical advice or a substitute for the medical advice, diagnosis, or treatment of a health care provider based on the health care provider's examination and assessment of a patient’s specific and unique circumstances. Patients must speak with a health care provider for complete information about their health, medical questions, and treatment options, including any risks or benefits regarding use of medications. This information does not endorse any treatments or medications as safe, effective, or approved for treating a specific patient. [employer], [employer] and its affiliates disclaim any warranty or liability relating to this information or the use thereof. The use of this information is governed by the Terms of Use, available at https://www.[employer].com/en/know/clinical-effectiveness-terms   Copyright   Copyright © 2024 [employer], Inc[employer] and its affiliates and/or licensors. All rights reserved.

## 2025-03-05 NOTE — PROGRESS NOTES
"    Assessment/Plan:    Diagnoses and all orders for this visit:    Acute pain of left shoulder  -     Ambulatory Referral to Orthopedic Surgery    Left elbow pain  -     Ambulatory Referral to Orthopedic Surgery    Fall from slip, trip, or stumble, initial encounter  -     Ambulatory Referral to Orthopedic Surgery    Plantar fasciitis  -     Ambulatory Referral to Podiatry; Future    No concern for structural injury such as high-grade or full-thickness rotator cuff tear at this time.  X-rays of the left shoulder and left elbow were reviewed and are within normal limits.  Discussed starting NSAIDs and provided home exercises for the shoulder.  We will see him back in 4 weeks for reevaluation if he is still having pain of the elbow and shoulder.  Chronic right heel pain consistent with plantar fasciitis I have provided him exercises to perform at home as well as referral to podiatrist.    Return in about 4 weeks (around 4/2/2025).      Subjective:   Patient ID: Santos Santos is a 54 y.o. male.    NP presents with left shoulder/arm pain for the past two wks post slip and fall onto left arm with mild improvement. Pt notes pain is localized to the lateral epicondyle and anterior shoulder.  Hx Chronic C spine issues with right hand N/T  Also with chronic right heel pain typically worse getting out of bed in the morning      Review of Systems    The following portions of the patient's chart were reviewed and updated as appropriate:   Allergy:    Allergies   Allergen Reactions    Mucinex Childrens [Dextromethorphan-Guaifenesin] Other (See Comments)     Feels terrible, \"I feel messed up.\"    Penicillins Other (See Comments)       Medications:    Current Outpatient Medications:     ergocalciferol (VITAMIN D2) 50,000 units, Take 1 capsule (50,000 Units total) by mouth once a week, Disp: 20 capsule, Rfl: 2    escitalopram (LEXAPRO) 5 mg tablet, TAKE 2 TABLETS BY MOUTH EVERY DAY, Disp: 200 tablet, Rfl: 1    furosemide (LASIX) " "20 mg tablet, TAKE 1 TABLET BY MOUTH EVERY OTHER DAY, Disp: 45 tablet, Rfl: 1    Krill Oil 1000 MG CAPS, krill oil, Disp: , Rfl:     losartan (COZAAR) 100 MG tablet, Take 1 tablet (100 mg total) by mouth daily, Disp: 90 tablet, Rfl: 1    meloxicam (Mobic) 15 mg tablet, Take 1 tablet (15 mg total) by mouth daily, Disp: 30 tablet, Rfl: 0    Multiple Vitamins-Minerals (MULTIVITAMIN ADULT EXTRA C PO), one daily, Disp: , Rfl:     metoprolol succinate (TOPROL-XL) 25 mg 24 hr tablet, Take 25 mg by mouth daily, Disp: , Rfl:     Patient Active Problem List   Diagnosis    Allergic rhinitis    Chronic cluster headache, not intractable    Erectile dysfunction of non-organic origin    Hematuria, microscopic    Hyperlipidemia    Intractable pain    Leukopenia    Palpitations    Cervical radiculopathy    Cervical spondylosis without myelopathy    Pain in joint involving ankle and foot    Lateral epicondylitis    Pain in elbow    Plantar fasciitis    Right flank pain    Knee joint effusion    Derangement of medial meniscus    Rib pain on right side    Benign prostatic hyperplasia with nocturia    Sprain of metacarpophalangeal joint    Anxiety    Entropion, left    Colon polyp    Obesity    Right lower quadrant abdominal pain    Fatty liver    Back muscle spasm    Vitamin D deficiency    JORGE LUIS (obstructive sleep apnea)    Primary hypertension    Localized edema       Objective:  Ht 5' 11\" (1.803 m)   Wt 127 kg (281 lb)   BMI 39.19 kg/m²     Left Elbow Exam     Tenderness   The patient is experiencing tenderness in the medial epicondyle.     Range of Motion   The patient has normal left elbow ROM.    Other   Erythema: absent  Sensation: normal  Pulse: present      Right Shoulder Exam     Range of Motion   Active abduction:  normal   External rotation:  normal     Muscle Strength   External rotation: 5/5       Left Shoulder Exam     Range of Motion   Active abduction:  normal   External rotation:  normal     Muscle Strength   External " rotation: 5/5     Tests   Drop arm: negative             Physical Exam      Neurologic Exam    Procedures    I have personally reviewed pertinent films in PACS. and I have personally reviewed the written report of the pertinent studies.   X-rays left shoulder with small calcification seen lateral to the humeral head consistent with calcific tendinopathy however no acute findings  X-ray of the left shoulder with no effusion or acute fracture or dislocation.            Past Medical History:   Diagnosis Date    Acute prostatitis 7/8/2020    Benign prostatic hyperplasia without lower urinary tract symptoms     Chronic prostatitis     Epididymitis 1/4/2018    Erectile dysfunction     Hypertension     Stress fracture of tibia 4/9/2019    Sees [facility].    Testicular hypogonadism        Past Surgical History:   Procedure Laterality Date    CYSTOSCOPY      Diagnostic Bladder    HERNIA REPAIR      SURGERY SCROTAL / TESTICULAR      Spermatic Cord for Varicocele- per [employer]       Social History     Socioeconomic History    Marital status: /Civil Union     Spouse name: Not on file    Number of children: Not on file    Years of education: Not on file    Highest education level: Not on file   Occupational History    Not on file   Tobacco Use    Smoking status: Never    Smokeless tobacco: Never   Vaping Use    Vaping status: Never Used   Substance and Sexual Activity    Alcohol use: No     Comment: Per [employer]: drinks socially    Drug use: No    Sexual activity: Not on file   Other Topics Concern    Not on file   Social History Narrative    Not on file     Social Drivers of Health     Financial Resource Strain: Not on file   Food Insecurity: Not on file   Transportation Needs: Not on file   Physical Activity: Not on file   Stress: Not on file   Social Connections: Not on file   Intimate Partner Violence: Not on file   Housing Stability: Not on file       Family History   Problem Relation Age of Onset    Breast cancer  Mother     Arthritis Father     Colon cancer Father     Coronary artery disease Father     Nephrolithiasis Father     Lung cancer Father     Testicular cancer Father     Osteoporosis Father     Cerebral palsy Brother

## 2025-03-17 ENCOUNTER — EVALUATION (OUTPATIENT)
Dept: PHYSICAL THERAPY | Facility: REHABILITATION | Age: 55
End: 2025-03-17
Payer: COMMERCIAL

## 2025-03-17 DIAGNOSIS — M25.512 ACUTE PAIN OF LEFT SHOULDER: ICD-10-CM
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
## 2025-03-17 DIAGNOSIS — M25.522 LEFT ELBOW PAIN: ICD-10-CM

## 2025-03-17 PROCEDURE — 97110 THERAPEUTIC EXERCISES: CPT | Performed by: PHYSICAL THERAPIST

## 2025-03-17 PROCEDURE — 97162 PT EVAL MOD COMPLEX 30 MIN: CPT | Performed by: PHYSICAL THERAPIST

## 2025-03-17 NOTE — PROGRESS NOTES
PT Evaluation     Today's date: 3/17/2025  Patient name: Santos Santos  : 1970  MRN: [id number]  Referring provider: Debbie Haney CRNP  Dx:   Encounter Diagnosis     ICD-10-CM    1. Acute pain of left shoulder  M25.512 Ambulatory Referral to Physical Therapy      2. Left elbow pain  M25.522 Ambulatory Referral to Physical Therapy      3. Fall from slip, trip, or stumble, initial encounter  W01.0XXA Ambulatory Referral to Physical Therapy                     Assessment  Impairments: abnormal coordination, abnormal or restricted ROM, activity intolerance, impaired physical strength, lacks appropriate home exercise program, pain with function and poor posture   Symptom irritability: high    Assessment details: Pt is a pleasant 54 year-old right-handed male presenting to outpatient physical therapy with acute pain of left shoulder and left elbow pain s/p fall approximately 4 weeks ago. Pt presents with left shoulder and elbow pain, impaired posture, decreased left shoulder range of motion, decreased left shoulder strength, impaired ability to perform ADLs, impaired ability to perform full work duties, and decreased tolerance to activity. Pt is a good candidate for outpatient physical therapy and would benefit from skilled physical therapy to address limitations and to achieve goals. Thank you for this referral.   Understanding of Dx/Px/POC: good     Prognosis: good    Goals  Short-Term Goals (4 weeks)   1. Patient will decrease worst rating of pain by 25% to improve quality of life.  2. Patient will increase strength by 1/2 MMT to improve quality of life with improved efficiency of daily activities.  3. Patient will improve ROM by 25% indicating improved mobility of affected area.    Long-Term Goals (8 weeks)   1. Patient will decrease pain by 50% at worst in comparison to IE indicating significant reduction in pain and improved quality of life.  2. Patient will demonstrate strength WFL compared to IE  levels indicating ability to independently manage pain symptoms to accomplish daily activities.   3. Patient will be independent with HEP with good form accomplished.      Plan  Patient would benefit from: PT eval and skilled PT  Planned modality interventions: cryotherapy and thermotherapy: hydrocollator packs    Planned therapy interventions: IADL retraining, body mechanics training, flexibility, functional ROM exercises, home exercise program, neuromuscular re-education, manual therapy, postural training, strengthening, stretching, therapeutic activities, therapeutic exercise, joint mobilization and IASTM    Frequency: 2x week  Duration in weeks: 8  Treatment plan discussed with: patient        Subjective Evaluation    History of Present Illness  Date of onset: 2/20/2025  Mechanism of injury: Pt is a 54 year-old right-handed male presenting to PT with 4 week history of left shoulder and elbow pain s/p fall. Pt reports on 2/20/25 he slipped on ice and fell onto his left shoulder and elbow. Pt reports he had immediate pain, went to PCP for further evaluation and was prescribed Mobic and Prednisone, referred to orthopedics for further evaluation. X-rays performed with results as follows:    L Shoulder X-Ray: No acute fracture or dislocation. No significant degenerative changes. No lytic or blastic osseous lesion. Calcifications in the region of the rotator cuff.    L Elbow X-Ray: No acute fracture or dislocation. No joint effusion. No significant degenerative changes. No lytic or blastic osseous lesion.    Pt prescribed Prednisone and     Pain Location: left shoulder region    Pain Type: varies, aching, sharp    Pain Intensity:  Current: 2  Best: 2  Worst: 4    Pt reports increased pain and/or difficulty with: reaching overhead, reaching behind back, washing hair, upper body dressing, laying on left side. Pt denies sleep disturbance secondary to pain.     Pt reports decreased pain with: medication, ice    Pain  Location: left elbow    Pain Type: varies, tenderness    Pain Intensity:  Current: 0  Best: 0  Worst: 3    Pt reports increased pain and/or difficulty with: bending/straightening elbow, leaning on elbow.    Pt reports decreased pain with:               Not a recurrent problem   Quality of life: good    Patient Goals  Patient goals for therapy: increased strength, independence with ADLs/IADLs, return to sport/leisure activities, increased motion and decreased pain      Diagnostic Tests  X-ray: abnormal        Objective     Postural Observations    Additional Postural Observation Details  Moderate forward head, bilateral protracted/IR shoulders, mildly increased thoracic kyphosis.     Tenderness     Left Shoulder   Tenderness in the biceps tendon (proximal) and supraspinatus tendon.     Right Shoulder  No tenderness in the biceps tendon (proximal) and supraspinatus tendon.     Left Elbow   Tenderness in the olecranon process. No tenderness in the lateral epicondyle.     Right Elbow   No tenderness in the lateral epicondyle.     Left Wrist/Hand   Tenderness in the olecranon process. No tenderness in the lateral epicondyle.     Right Wrist/Hand   No tenderness in the lateral epicondyle.     Cervical/Thoracic Screen   Cervical range of motion within normal limits  Cervical range of motion within normal limits with the following exceptions: Pt with c/o localized lower cervical tightness with bilateral lateral flexion and rotation, no radicular symptoms.    Neurological Testing     Sensation     Shoulder   Left Shoulder   Intact: light touch    Right Shoulder   Intact: Light touch    Active Range of Motion   Left Shoulder   Flexion: 115 degrees with pain  Abduction: 95 degrees with pain  External rotation BTH: C2 with pain  Internal rotation BTB: sacrum with pain    Right Shoulder   Flexion: WFL  Abduction: WFL  External rotation BTH: T1   Internal rotation BTB: T10     Left Elbow   Normal active range of motion  Flexion:  WFL and with pain  Extension: WFL and with pain  Forearm supination: WFL  Forearm pronation: WFL    Right Elbow   Normal active range of motion    Additional Active Range of Motion Details  L Elbow ROM full, painful at end-ranges    Passive Range of Motion   Left Shoulder   Flexion: 115 degrees with pain  Abduction: 110 degrees with pain  External rotation 90°: 65 degrees with pain  Internal rotation 90°: 0 degrees with pain    Right Shoulder   Flexion: WFL  Abduction: WFL  External rotation 90°: WFL  Internal rotation 90°: WFL    Joint Play   Left Shoulder  Hypomobile in the anterior capsule, posterior capsule and inferior capsule.    Right Shoulder  Joints within functional limits are the anterior capsule, posterior capsule and inferior capsule.     Strength/Myotome Testing     Left Shoulder     Planes of Motion   Flexion: 3+   Abduction: 3+   External rotation at 0°: 3+   Internal rotation at 0°: 4-     Isolated Muscles   Biceps: 5   Lower trapezius: 3-   Rhomboids: 3-   Triceps: 5     Right Shoulder     Planes of Motion   Flexion: 5   Abduction: 5   External rotation at 0°: 5   Internal rotation at 0°: 5     Isolated Muscles   Biceps: 5   Lower trapezius: 4-   Rhomboids: 4-   Triceps: 5     Left Elbow   Flexion: 5  Extension: 5  Forearm supination: 5  Forearm pronation: 5    Right Elbow   Normal strength    Tests     Left Shoulder   Positive Speed's.   Negative external rotation lag sign.     Right Shoulder   Negative external rotation lag sign and Speed's.     Left Elbow   Negative Cozen's, Mill's, valgus stress at 0 degrees, valgus stress at 30 degrees, varus stress at 0 degrees and varus stress at 30 degrees.     Right Elbow   Negative Cozen's, Mill's, valgus stress at 0 degrees, valgus stress at 30 degrees, varus stress at 0 degrees and varus stress at 30 degrees.                  Precautions:   Patient Active Problem List   Diagnosis    Allergic rhinitis    Chronic cluster headache, not intractable     "Erectile dysfunction of non-organic origin    Hematuria, microscopic    Hyperlipidemia    Intractable pain    Leukopenia    Palpitations    Cervical radiculopathy    Cervical spondylosis without myelopathy    Pain in joint involving ankle and foot    Lateral epicondylitis    Pain in elbow    Plantar fasciitis    Right flank pain    Knee joint effusion    Derangement of medial meniscus    Rib pain on right side    Benign prostatic hyperplasia with nocturia    Sprain of metacarpophalangeal joint    Anxiety    Entropion, left    Colon polyp    Obesity    Right lower quadrant abdominal pain    Fatty liver    Back muscle spasm    Vitamin D deficiency    JORGE LUIS (obstructive sleep apnea)    Primary hypertension    Localized edema      Daily Treatment Diary      Visit # 1 2 3 4 5 6 7 8 9 10   Assessment  3/17                     Eval/Miranda CLARK                 **   FOTO         **         **   Manuals                                                     Prescribed POC    Pt Education  MD                      HEP Issued/Updated  MD                      Pulleys with MHP to Begin                        Upper Trapezius Stretch - L  15\"x3                      Levator Scapulae Stretch - L  15\"x3                      Table Slides: Flexion & Scaption  10\"x3 ea                      Table ER Stretch                        Scap Retractions                        Supine Wand Press-Ups                        Supine Wand Flexion                        SubMax Isometrics: Flex/ABD/Ext                                               Modalities    MHP                        CP             QR Code:    [facility] HEP:  Access Code: [email or web address]  URL: [email or web address]  Date: 03/17/2025  Prepared by: Roro Faust    Exercises  - Seated Upper Trapezius Stretch  - 1 x daily - 3 reps - 15 hold  - Seated Levator Scapulae Stretch  - 1 x daily - 3 reps - 15 hold  - Seated Shoulder Flexion Towel Slide at Table Top Full Range of Motion  - 1 x " daily - 3 reps - 10 hold  - Seated Shoulder Scaption Slide at Table Top with Forearm in Neutral  - 1 x daily - 3 reps - 10 hold    Patient Education  - Ice

## 2025-03-21 ENCOUNTER — APPOINTMENT (OUTPATIENT)
Dept: PHYSICAL THERAPY | Facility: REHABILITATION | Age: 55
End: 2025-03-21
Payer: COMMERCIAL

## 2025-03-24 ENCOUNTER — OFFICE VISIT (OUTPATIENT)
Dept: PODIATRY | Facility: CLINIC | Age: 55
End: 2025-03-24
Payer: COMMERCIAL

## 2025-03-24 ENCOUNTER — APPOINTMENT (OUTPATIENT)
Dept: PHYSICAL THERAPY | Facility: REHABILITATION | Age: 55
End: 2025-03-24
Payer: COMMERCIAL

## 2025-03-24 VITALS — WEIGHT: 283 LBS | BODY MASS INDEX: 39.62 KG/M2 | HEIGHT: 71 IN

## 2025-03-24 DIAGNOSIS — M72.2 PLANTAR FASCIITIS, RIGHT: ICD-10-CM

## 2025-03-24 PROCEDURE — 99203 OFFICE O/P NEW LOW 30 MIN: CPT | Performed by: PODIATRIST

## 2025-03-24 NOTE — PATIENT INSTRUCTIONS
Patient Education     Plantar Fasciitis Exercises   About this topic   Plantar fasciitis is swelling of the thick tissue on the bottom of the foot. This tissue is called the plantar fascia. It connects the heel bone to the toes and supports the arch of the foot. Exercise is an important part of making this problem better.  General   Before starting with a program, ask your doctor if you are healthy enough to do these exercises. Your doctor may have you work with a  or physical therapist to make a safe exercise program to meet your needs.  Stretching Exercises   Stretching exercises keep your muscles flexible. They also stop them from getting tight. Start by doing each of these stretches 2 to 3 times. In order for your body to make changes, you will need to hold these stretches for 20 to 30 seconds. Try to do the stretches 2 to 3 times each day. Do all exercises slowly.  Calf stretches standing ? Stand about 12 to 18 inches (30 to 45 cm) away from a wall. Place your hands on the wall at shoulder level. Lean forward.  Knee straight - Stretch your left leg straight behind you. Make sure the left heel is flat on the floor and the left knee is straight. Now, bend the knee of the right leg until you feel a stretch in your left calf. Be sure that the heel does not come up. Repeat on the other side.  Knee bent - Take a small step forward with your left leg so your feet are slightly closer together. With your right leg bent, bend your left knee forward until you feel a stretch in the back of the calf of your left leg. This will feel strange, but it is the best way to stretch this calf muscle. Repeat on the other side.  Calf stretches lying down with belt or towel ? Lie on your back with both legs straight. Loop a belt or towel around the ball of one foot. Lift the leg up, keeping the knee straight until you feel a stretch in the back of your thigh. Pull back on the belt or towel to bend your foot more for a better  stretch. Repeat on the other foot. This stretch gets both your calf and the hamstrings on the back of your thigh.  Calf stretches on stairs ? Stand on a step and hold onto a rail. Position your feet so only the balls of your feet are on the step. Lower both heels until you feel a stretch in the back of your calves. Do not do this stretch if you have trouble with balance.  Crossed leg foot stretches ? Sit in a chair. Bend one leg up and rest the outside of the foot on the knee. Grab your foot and pull your toes and foot forward until you feel a stretch along the bottom of your foot. Repeat with the other foot.  Rolling foot ? Use a golf ball, tennis ball, soup can, a frozen water bottle, or a rolling pin for this exercise. Sit in a sturdy chair. Put the ball, can, or rolling pin underneath your sore foot. Push down firmly and roll it back and forth with your foot for 1 to 2 minutes. Work up to 3 to 4 minutes. If this exercise is too easy, try doing it while standing up with more pressure on the foot. Do this exercise last if you use a frozen water bottle. Heated tissue stretches better than cold tissue. Doing this last with a frozen water bottle can help lessen the pain and swelling that may happen with stretching.  Strengthening Exercises   Strengthening exercises keep your muscles firm and strong. Start by repeating each exercise 2 to 3 times. Work up to doing each exercise 10 times. Try to do the exercises 2 to 3 times each day. Do all exercises slowly.  Towel pick-ups ? Sit in a chair with your feet flat on the floor. Make sure you do not have shoes or socks on your feet. Place a towel under one foot with one end of the towel lined up with your heel. Keep your heel on the floor and try grabbing the towel with your toes. Gradually work it back until there is no more towel to move. Now, try pushing the towel back out while keeping your heel still. You can make this harder by putting a small weight on the end of the  towel.               What will the results be?   Less pain  Less pulling  Less swelling  Better flexibility and range of motion  Easier to walk and do other activities  Helpful tips   Stay active and work out to keep your muscles strong and flexible.  Keep a healthy weight to avoid putting too much stress on your joints. Eat a healthy diet to keep your muscles healthy.  Be sure you do not hold your breath when exercising. This can raise your blood pressure. If you tend to hold your breath, try counting out loud when exercising. If any exercise bothers you, stop right away.  Always warm up before stretching. Heated muscles stretch much easier than cool muscles. Stretching cool muscles can lead to injury.  Try walking or cycling at an easy pace for a few minutes to warm up your muscles. Do this again after exercising.  Never bounce when doing stretches.  Doing exercises before a meal may be a good way to get into a routine.  After exercising, it is a good idea to ice the bottom of your foot. A good way to do this is by sitting in a chair and rolling a frozen water bottle back and forth under your foot. Do not do this longer than 15 to 20 minutes.  Exercise may be slightly uncomfortable, but you should not have sharp pains. If you do get sharp pains, stop what you are doing. If the sharp pains continue, call your doctor.  Last Reviewed Date   2021-05-19  Consumer Information Use and Disclaimer   This generalized information is a limited summary of diagnosis, treatment, and/or medication information. It is not meant to be comprehensive and should be used as a tool to help the user understand and/or assess potential diagnostic and treatment options. It does NOT include all information about conditions, treatments, medications, side effects, or risks that may apply to a specific patient. It is not intended to be medical advice or a substitute for the medical advice, diagnosis, or treatment of a health care provider based  on the health care provider's examination and assessment of a patient’s specific and unique circumstances. Patients must speak with a health care provider for complete information about their health, medical questions, and treatment options, including any risks or benefits regarding use of medications. This information does not endorse any treatments or medications as safe, effective, or approved for treating a specific patient. UpToDate, Inc[employer] and its affiliates disclaim any warranty or liability relating to this information or the use thereof. The use of this information is governed by the Terms of Use, available at https://www.[employer]/en/know/clinical-effectiveness-terms   Copyright   Copyright © 2024 UpToDate, Inc. and its affiliates and/or licensors. All rights reserved.    Patient Education     Plantar Fasciitis Exercises   About this topic   Plantar fasciitis is swelling of the thick tissue on the bottom of the foot. This tissue is called the plantar fascia. It connects the heel bone to the toes and supports the arch of the foot. Exercise is an important part of making this problem better.  General   Before starting with a program, ask your doctor if you are healthy enough to do these exercises. Your doctor may have you work with a  or physical therapist to make a safe exercise program to meet your needs.  Stretching Exercises   Stretching exercises keep your muscles flexible. They also stop them from getting tight. Start by doing each of these stretches 2 to 3 times. In order for your body to make changes, you will need to hold these stretches for 20 to 30 seconds. Try to do the stretches 2 to 3 times each day. Do all exercises slowly.  Calf stretches standing ? Stand about 12 to 18 inches (30 to 45 cm) away from a wall. Place your hands on the wall at shoulder level. Lean forward.  Knee straight - Stretch your left leg straight behind you. Make sure the left heel is flat on the floor and the  left knee is straight. Now, bend the knee of the right leg until you feel a stretch in your left calf. Be sure that the heel does not come up. Repeat on the other side.  Knee bent - Take a small step forward with your left leg so your feet are slightly closer together. With your right leg bent, bend your left knee forward until you feel a stretch in the back of the calf of your left leg. This will feel strange, but it is the best way to stretch this calf muscle. Repeat on the other side.  Calf stretches lying down with belt or towel ? Lie on your back with both legs straight. Loop a belt or towel around the ball of one foot. Lift the leg up, keeping the knee straight until you feel a stretch in the back of your thigh. Pull back on the belt or towel to bend your foot more for a better stretch. Repeat on the other foot. This stretch gets both your calf and the hamstrings on the back of your thigh.  Calf stretches on stairs ? Stand on a step and hold onto a rail. Position your feet so only the balls of your feet are on the step. Lower both heels until you feel a stretch in the back of your calves. Do not do this stretch if you have trouble with balance.  Crossed leg foot stretches ? Sit in a chair. Bend one leg up and rest the outside of the foot on the knee. Grab your foot and pull your toes and foot forward until you feel a stretch along the bottom of your foot. Repeat with the other foot.  Rolling foot ? Use a golf ball, tennis ball, soup can, a frozen water bottle, or a rolling pin for this exercise. Sit in a sturdy chair. Put the ball, can, or rolling pin underneath your sore foot. Push down firmly and roll it back and forth with your foot for 1 to 2 minutes. Work up to 3 to 4 minutes. If this exercise is too easy, try doing it while standing up with more pressure on the foot. Do this exercise last if you use a frozen water bottle. Heated tissue stretches better than cold tissue. Doing this last with a frozen  water bottle can help lessen the pain and swelling that may happen with stretching.  Strengthening Exercises   Strengthening exercises keep your muscles firm and strong. Start by repeating each exercise 2 to 3 times. Work up to doing each exercise 10 times. Try to do the exercises 2 to 3 times each day. Do all exercises slowly.  Towel pick-ups ? Sit in a chair with your feet flat on the floor. Make sure you do not have shoes or socks on your feet. Place a towel under one foot with one end of the towel lined up with your heel. Keep your heel on the floor and try grabbing the towel with your toes. Gradually work it back until there is no more towel to move. Now, try pushing the towel back out while keeping your heel still. You can make this harder by putting a small weight on the end of the towel.               What will the results be?   Less pain  Less pulling  Less swelling  Better flexibility and range of motion  Easier to walk and do other activities  Helpful tips   Stay active and work out to keep your muscles strong and flexible.  Keep a healthy weight to avoid putting too much stress on your joints. Eat a healthy diet to keep your muscles healthy.  Be sure you do not hold your breath when exercising. This can raise your blood pressure. If you tend to hold your breath, try counting out loud when exercising. If any exercise bothers you, stop right away.  Always warm up before stretching. Heated muscles stretch much easier than cool muscles. Stretching cool muscles can lead to injury.  Try walking or cycling at an easy pace for a few minutes to warm up your muscles. Do this again after exercising.  Never bounce when doing stretches.  Doing exercises before a meal may be a good way to get into a routine.  After exercising, it is a good idea to ice the bottom of your foot. A good way to do this is by sitting in a chair and rolling a frozen water bottle back and forth under your foot. Do not do this longer than 15  to 20 minutes.  Exercise may be slightly uncomfortable, but you should not have sharp pains. If you do get sharp pains, stop what you are doing. If the sharp pains continue, call your doctor.  Last Reviewed Date   2021-05-19  Consumer Information Use and Disclaimer   This generalized information is a limited summary of diagnosis, treatment, and/or medication information. It is not meant to be comprehensive and should be used as a tool to help the user understand and/or assess potential diagnostic and treatment options. It does NOT include all information about conditions, treatments, medications, side effects, or risks that may apply to a specific patient. It is not intended to be medical advice or a substitute for the medical advice, diagnosis, or treatment of a health care provider based on the health care provider's examination and assessment of a patient’s specific and unique circumstances. Patients must speak with a health care provider for complete information about their health, medical questions, and treatment options, including any risks or benefits regarding use of medications. This information does not endorse any treatments or medications as safe, effective, or approved for treating a specific patient. UpToDate, Inc. and its affiliates disclaim any warranty or liability relating to this information or the use thereof. The use of this information is governed by the Terms of Use, available at https://www.[employer].com/en/know/clinical-effectiveness-terms   Copyright   Copyright © 2024 UpToDate, Inc. and its affiliates and/or licensors. All rights reserved.

## 2025-03-24 NOTE — PROGRESS NOTES
"Name: Santos Santos      : 1970      MRN: [id number]  Encounter Provider: Bernabe Patel DPM  Encounter Date: 3/24/2025   Encounter department: [facility] [place]  :  Assessment & Plan  Plantar fasciitis, right  Diagnosis and options discussed with patient  Patient agreeable to the plan as stated below    1. Provided home therapy and stretching exercises. These should be done AT MINIMUM three times per day  2. Deferred injections today, but did discuss the possibility.   3. Stressed compliance with home/formal PT and arch supports.    Orders:  •  Ambulatory Referral to Podiatry        History of Present Illness   HPI  Santos Santos is a 54 y.o. male who presents with right heel pain. Pain plantar heel, it's been there for months. He saw Sports medicine and was given some stretches. He's been doing that most days. There was some improvement.       Review of Systems  As stated in HPI, otherwise normal    Medical History Reviewed by provider this encounter:  Tobacco  Allergies  Meds  Problems  Med Hx  Surg Hx  Fam Hx           Objective   Ht 5' 11\" (1.803 m)   Wt 128 kg (283 lb)   BMI 39.47 kg/m²      Physical Exam  Vitals reviewed.   Constitutional:       General: He is not in acute distress.  Cardiovascular:      Rate and Rhythm: Normal rate.      Pulses: Normal pulses.   Pulmonary:      Effort: Pulmonary effort is normal. No respiratory distress.   Musculoskeletal:         General: Tenderness (sharp pain plantar medial right heel.) present. No deformity.   Skin:     Capillary Refill: Capillary refill takes less than 2 seconds.   Neurological:      Mental Status: He is alert and oriented to person, place, and time.      Sensory: No sensory deficit.      Gait: Gait normal.           "

## 2025-03-24 NOTE — ASSESSMENT & PLAN NOTE
Diagnosis and options discussed with patient  Patient agreeable to the plan as stated below    1. Provided home therapy and stretching exercises. These should be done AT MINIMUM three times per day  2. Deferred injections today, but did discuss the possibility.   3. Stressed compliance with home/formal PT and arch supports.    Orders:  •  Ambulatory Referral to Podiatry

## 2025-03-25 ENCOUNTER — APPOINTMENT (OUTPATIENT)
Dept: PHYSICAL THERAPY | Facility: REHABILITATION | Age: 55
End: 2025-03-25
Payer: COMMERCIAL

## 2025-03-25 ENCOUNTER — OFFICE VISIT (OUTPATIENT)
Dept: PHYSICAL THERAPY | Facility: REHABILITATION | Age: 55
End: 2025-03-25
Payer: COMMERCIAL
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
## 2025-03-25 DIAGNOSIS — M25.522 LEFT ELBOW PAIN: ICD-10-CM

## 2025-03-25 DIAGNOSIS — M25.512 ACUTE PAIN OF LEFT SHOULDER: Primary | ICD-10-CM

## 2025-03-25 PROCEDURE — 97110 THERAPEUTIC EXERCISES: CPT

## 2025-03-25 PROCEDURE — 97112 NEUROMUSCULAR REEDUCATION: CPT

## 2025-03-25 NOTE — PROGRESS NOTES
"Daily Note     Today's date: 3/25/2025  Patient name: Santos Santos  : 1970  MRN: [id number]  Referring provider: Debbie Haney CRNP  Dx:   Encounter Diagnosis     ICD-10-CM    1. Acute pain of left shoulder  M25.512       2. Left elbow pain  M25.522       3. Fall from slip, trip, or stumble, initial encounter  W01.0XXA                      Subjective: Pt comes to therapy reporting that he is compliant with HEP and the exercises are helpful so far. He states that he attempted to lift his granddaughter over the weekend and it exacerbated his elbow pain some, however it is starting to feel better.    Objective: See treatment diary below    Assessment: Pt with good tolerance to addition of pulleys with MHP for warm up. Pt notes \"popping\" sensation following table ER stretch that he notes feels slightly tender. He continues to note some soreness with wand press ups and flexion, however is able to complete in pain free ROM. Relief following CP application post treatment. Pt denies adverse response post treatment. Will monitor response to today's treatment and progress as able at nv. Pt would benefit from continued skilled PT to improve current deficits and maximize function.    Plan: Continue per plan of care.  Progress treatment as tolerated.       Precautions:   Patient Active Problem List   Diagnosis    Allergic rhinitis    Chronic cluster headache, not intractable    Erectile dysfunction of non-organic origin    Hematuria, microscopic    Hyperlipidemia    Intractable pain    Leukopenia    Palpitations    Cervical radiculopathy    Cervical spondylosis without myelopathy    Pain in joint involving ankle and foot    Lateral epicondylitis    Pain in elbow    Plantar fasciitis    Right flank pain    Knee joint effusion    Derangement of medial meniscus    Rib pain on right side    Benign prostatic hyperplasia with nocturia    Sprain of metacarpophalangeal joint    Anxiety    Entropion, left    Colon polyp    " "Obesity    Right lower quadrant abdominal pain    Fatty liver    Back muscle spasm    Vitamin D deficiency    JORGE LUIS (obstructive sleep apnea)    Primary hypertension    Localized edema      Daily Treatment Diary      Visit # 1 2 3 4 5 6 7 8 9 10   Assessment  3/17  3/25                   Moe/Miranda CLARK                 **   FOTO         **         **   Manuals                                                     Prescribed POC    Pt Education  MD                      HEP Issued/Updated  MD                      Pulleys with MHP to Begin    5\"x5 min                    Upper Trapezius Stretch - L  15\"x3  15\"x3                    Levator Scapulae Stretch - L  15\"x3  15\"x3 ea                    Table Slides: Flexion & Scaption  10\"x3 ea  5\"x10 ea  10\"x 3 ea                  Table ER Stretch    10\"x3                    Scap Retractions                        Supine Wand Press-Ups    5\"x10                    Supine Wand Flexion    5\"x10                    SubMax Isometrics: Flex/ABD/Ext                                               Modalities    MHP    5' w/pulleys                    CP  10'           QR Code:    [facility] HEP:  Access Code: [email or web address]  URL: [email or web address]  Date: 03/17/2025  Prepared by: Roro Faust    Exercises  - Seated Upper Trapezius Stretch  - 1 x daily - 3 reps - 15 hold  - Seated Levator Scapulae Stretch  - 1 x daily - 3 reps - 15 hold  - Seated Shoulder Flexion Towel Slide at Table Top Full Range of Motion  - 1 x daily - 3 reps - 10 hold  - Seated Shoulder Scaption Slide at Table Top with Forearm in Neutral  - 1 x daily - 3 reps - 10 hold    Patient Education  - Ice       "

## 2025-03-26 ENCOUNTER — APPOINTMENT (OUTPATIENT)
Dept: PHYSICAL THERAPY | Facility: REHABILITATION | Age: 55
End: 2025-03-26
Payer: COMMERCIAL

## 2025-04-01 ENCOUNTER — APPOINTMENT (OUTPATIENT)
Dept: PHYSICAL THERAPY | Facility: REHABILITATION | Age: 55
End: 2025-04-01
Payer: COMMERCIAL

## 2025-04-04 ENCOUNTER — OFFICE VISIT (OUTPATIENT)
Dept: PHYSICAL THERAPY | Facility: REHABILITATION | Age: 55
End: 2025-04-04
Payer: COMMERCIAL

## 2025-04-04 DIAGNOSIS — M25.512 ACUTE PAIN OF LEFT SHOULDER: Primary | ICD-10-CM
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
## 2025-04-04 DIAGNOSIS — M25.522 LEFT ELBOW PAIN: ICD-10-CM

## 2025-04-04 PROCEDURE — 97110 THERAPEUTIC EXERCISES: CPT | Performed by: PHYSICAL THERAPIST

## 2025-04-04 PROCEDURE — 97140 MANUAL THERAPY 1/> REGIONS: CPT | Performed by: PHYSICAL THERAPIST

## 2025-04-04 PROCEDURE — 97112 NEUROMUSCULAR REEDUCATION: CPT | Performed by: PHYSICAL THERAPIST

## 2025-04-04 NOTE — PROGRESS NOTES
Dear Team Member,    Per your recent telephone screening with Employee Health:          You are to remain off work and out of public places except to seek medical care, and complete the symptom tracker daily on [employer].  YOU MAY NOT RETURN TO WORK WITHOUT CLEARANCE FROM EMPLOYEE HEALTH.    Off work start date: 05/20/20  Off work end date: 05/25/20          Notify employee health if you have a marked increase or decrease in symptoms between check ins.    For [place] Employees, please follow this link to view a copy of the consent form: [email or web address]    For [place] Employees, please follow this link to view a copy of the consent form: [email or web address]    Thank you,    Employee Health    Cc: Manager   "Daily Note     Today's date: 2025  Patient name: Santos Santos  : 1970  MRN: [id number]  Referring provider: Debbie Haney CRNP  Dx:   Encounter Diagnosis     ICD-10-CM    1. Acute pain of left shoulder  M25.512       2. Left elbow pain  M25.522       3. Fall from slip, trip, or stumble, initial encounter  W01.0XXA                      Subjective: Pt reports his shoulder and elbow feel about the same, feels he may have \"overdid it yesterday\" laying on his back to elevate his legs with both arms stretching out to his side. He reports he had soreness in his shoulder when moved his arm out of this position, woke up with soreness this morning. Pt reports he has been doing initial HEP once/day, however, has only been icing his shoulder twice/week and is no longer taking prescribed anti-inflammatory medication.    Objective: See treatment diary below. Pt issued updated HEP to which he/she demonstrated and verbalized understanding.    Assessment: Pt with good response to addition of manual techniques with improved left shoulder mobility with completion. Pt with good tolerance to progression of program with increased duration with table slides and ER stretch, as well as addition of resistance with supine wand press-ups and flexion. Pt with appropriate challenge and fatigue, notes mild shoulder soreness persists with completion of session but not worse than when he presented for session today. Pt demonstrates good understanding and carryover with current program. Will continue to progress program as able. Pt will benefit from continued skilled PT intervention in order to address remaining limitations and to restore maximal function.       Plan: Continue per plan of care.  Progress treatment as tolerated.       Precautions:   Patient Active Problem List   Diagnosis    Allergic rhinitis    Chronic cluster headache, not intractable    Erectile dysfunction of non-organic origin    Hematuria, microscopic    " "Hyperlipidemia    Intractable pain    Leukopenia    Palpitations    Cervical radiculopathy    Cervical spondylosis without myelopathy    Pain in joint involving ankle and foot    Lateral epicondylitis    Pain in elbow    Plantar fasciitis    Right flank pain    Knee joint effusion    Derangement of medial meniscus    Rib pain on right side    Benign prostatic hyperplasia with nocturia    Sprain of metacarpophalangeal joint    Anxiety    Entropion, left    Colon polyp    Obesity    Right lower quadrant abdominal pain    Fatty liver    Back muscle spasm    Vitamin D deficiency    JORGE LUIS (obstructive sleep apnea)    Primary hypertension    Localized edema      Daily Treatment Diary      Visit # 1 2 3 4 5 6 7 8 9 10   Assessment  3/17  3/25  4/4                 Eval/Reval  MD                 **   FOTO         **         **   Manuals    L Shoulder PROM      MD                                           Prescribed POC    Pt Education  MD                      HEP Issued/Updated  MD                      Pulleys with MHP to Begin    5\"x5 min  10\"x5'                  Upper Trapezius Stretch - L  15\"x3  15\"x3  HEP                  Levator Scapulae Stretch - L  15\"x3  15\"x3 ea  HEP                  Table Slides: Flexion & Scaption  10\"x3 ea  5\"x10 ea  10\"x 10 ea                  Table ER Stretch    10\"x3  10\"x10                  Scap Retractions                        Supine Wand Press-Ups    5\"x10  Yellow Bar   5\"x10                  Supine Wand Flexion    5\"x10  Yellow Bar  5\"x10                  SubMax Isometrics: Flex/ABD/Ext                                               Modalities    MHP    5' w/pulleys  5' w/pulleys                  CP  10' 10'          QR Code:    [facility] HEP:  Access Code: [email or web address]  URL: [email or web address]  Date: 04/04/2025  Prepared by: Roro Faust    Exercises  - Seated Upper Trapezius Stretch  - 1 x daily - 3 reps - 20 hold  - Seated Levator Scapulae Stretch  - 1 x daily - 3 reps - " 20 hold  - Seated Shoulder Flexion Towel Slide at Table Top Full Range of Motion  - 1 x daily - 10 reps - 10 hold  - Seated Shoulder Scaption Slide at Table Top with Forearm in Neutral  - 1 x daily - 10 reps - 10 hold  - Seated Shoulder External Rotation PROM on Table  - 1 x daily - 10 reps - 10 hold  - Supine Shoulder Press AAROM in Abduction with Dowel  - 1 x daily - 10 reps - 5 hold  - Supine Shoulder Flexion with Dowel  - 1 x daily - 10 reps - 5 hold    Patient Education  - Ice

## 2025-04-05 DIAGNOSIS — M25.522 LEFT ELBOW PAIN: ICD-10-CM
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
## 2025-04-07 RX ORDER — MELOXICAM 15 MG/1
15 TABLET ORAL DAILY
Qty: 30 TABLET | Refills: 0 | Status: SHIPPED | OUTPATIENT
Start: 2025-04-07

## 2025-05-02 DIAGNOSIS — I10 BENIGN ESSENTIAL HYPERTENSION: ICD-10-CM

## 2025-05-02 RX ORDER — LOSARTAN POTASSIUM 100 MG/1
100 TABLET ORAL DAILY
Qty: 90 TABLET | Refills: 1 | Status: SHIPPED | OUTPATIENT
Start: 2025-05-02

## 2025-05-05 ENCOUNTER — OFFICE VISIT (OUTPATIENT)
Dept: PODIATRY | Facility: CLINIC | Age: 55
End: 2025-05-05
Payer: COMMERCIAL

## 2025-05-05 VITALS — HEIGHT: 71 IN | WEIGHT: 283 LBS | BODY MASS INDEX: 39.62 KG/M2

## 2025-05-05 DIAGNOSIS — B35.3 TINEA PEDIS OF BOTH FEET: ICD-10-CM

## 2025-05-05 DIAGNOSIS — L29.9 PRURITUS: ICD-10-CM

## 2025-05-05 DIAGNOSIS — M72.2 PLANTAR FASCIITIS, RIGHT: Primary | ICD-10-CM

## 2025-05-05 PROCEDURE — 99213 OFFICE O/P EST LOW 20 MIN: CPT | Performed by: PODIATRIST

## 2025-05-05 PROCEDURE — 20550 NJX 1 TENDON SHEATH/LIGAMENT: CPT | Performed by: PODIATRIST

## 2025-05-05 RX ORDER — CLOTRIMAZOLE AND BETAMETHASONE DIPROPIONATE 10; .64 MG/G; MG/G
CREAM TOPICAL 2 TIMES DAILY
Qty: 45 G | Refills: 0 | Status: SHIPPED | OUTPATIENT
Start: 2025-05-05 | End: 2025-06-04

## 2025-05-05 RX ORDER — TRIAMCINOLONE ACETONIDE 40 MG/ML
40 INJECTION, SUSPENSION INTRA-ARTICULAR; INTRAMUSCULAR
Status: SHIPPED | OUTPATIENT
Start: 2025-05-05

## 2025-05-05 RX ORDER — LIDOCAINE HYDROCHLORIDE 10 MG/ML
1 INJECTION, SOLUTION INFILTRATION; PERINEURAL
Status: SHIPPED | OUTPATIENT
Start: 2025-05-05

## 2025-05-05 RX ADMIN — TRIAMCINOLONE ACETONIDE 40 MG: 40 INJECTION, SUSPENSION INTRA-ARTICULAR; INTRAMUSCULAR at 09:45

## 2025-05-05 RX ADMIN — LIDOCAINE HYDROCHLORIDE 1 ML: 10 INJECTION, SOLUTION INFILTRATION; PERINEURAL at 09:45

## 2025-05-05 NOTE — PROGRESS NOTES
Name: Santos Santos      : 1970      MRN: [id number]  Encounter Provider: Bernabe Patel DPM  Encounter Date: 2025   Encounter department: [facility] [place]  :  Assessment & Plan  Pruritus    Orders:  •  clotrimazole-betamethasone (LOTRISONE) 1-0.05 % cream; Apply topically 2 (two) times a day    Tinea pedis of both feet  Apply BID  Orders:  •  clotrimazole-betamethasone (LOTRISONE) 1-0.05 % cream; Apply topically 2 (two) times a day    Plantar fasciitis, right  See injection  Continue therapy  RTC 6 weeks  Orders:  •  Ambulatory Referral to Physical Therapy; Future  •  Foot/lower extremity injection  •  lidocaine (XYLOCAINE) 1 % injection 1 mL  •  triamcinolone acetonide (Kenalog-40) 40 mg/mL injection 40 mg    Foot/lower extremity injection    Performed by: Bernabe Patel DPM  Authorized by: Bernabe Patel DPM    Procedure:     Verbal consent obtained?: Yes      Risks and benefits: Risks, benefits and alternatives were discussed      Consent given by:  Patient    Time out: Immediately prior to the procedure a time out was called      Time out performed at:  2025 10:13 AM    Patient states understanding of procedure being performed: Yes      Patient identity confirmed:  Verbally with patient    Supporting Documentation:     Indications:  Therapeutic and pain    Procedure Details:                Ethyl Chloride was applied      Needle size: 25 G G    Approach:  Plantar    Laterality:  Right    Cyst Aspiration/Injection: No      Location: aponeurosis      Aponeurosis Structures: Plantar fascia origin      Injection Information:       Medications:  1 mL lidocaine 1 %; 40 mg triamcinolone acetonide 40 mg/mL    Patient tolerance:  Patient tolerated the procedure well with no immediate complications    Comments:      After answering all the patient's questions, I injected RIGHT heel with 0.5cc kenalog 40 and 1cc 1% lidocaine plain.  Oral directions were given for rest and  "ice on the affected heel(s) and elevation. The ice is to be used for approximately 20 minutes at a time, 1-2 times a day for a few days.  Home therapy instructions were demonstrated and a copy was given to the patient. The patient is to call at once if there is any increased pain, swelling, tenderness, redness, etc.          History of Present Illness   HPI  Santos Santos is a 54 y.o. male who presents for F/U for heel pain. He's been stretching 3-5 times per day and doing a massage gun. HE is not making much progress.     He's also getting pain in the left forefoot, at the base of the toes. This p[ain is in both feet. The right heel is still very sore.       Review of Systems  As stated in HPI, otherwise normal    Medical History Reviewed by provider this encounter:  Tobacco  Allergies  Meds  Problems  Med Hx  Surg Hx  Fam Hx           Objective   Ht 5' 11\" (1.803 m)   Wt 128 kg (283 lb)   BMI 39.47 kg/m²      Physical Exam  Vitals reviewed.   Constitutional:       Appearance: He is obese.   Cardiovascular:      Pulses: Normal pulses.   Musculoskeletal:         General: Tenderness (sharp pain right PF insertion) present. No swelling or deformity (negative mortons sign, No MTPJ instability).   Skin:     Capillary Refill: Capillary refill takes less than 2 seconds.      Findings: No bruising.   Neurological:      Mental Status: He is alert and oriented to person, place, and time.      Sensory: No sensory deficit.           "

## 2025-05-27 ENCOUNTER — TELEPHONE (OUTPATIENT)
Dept: GASTROENTEROLOGY | Facility: CLINIC | Age: 55
End: 2025-05-27

## 2025-05-30 DIAGNOSIS — R60.0 LOCALIZED EDEMA: ICD-10-CM

## 2025-05-30 RX ORDER — FUROSEMIDE 20 MG/1
20 TABLET ORAL EVERY OTHER DAY
Qty: 45 TABLET | Refills: 1 | Status: SHIPPED | OUTPATIENT
Start: 2025-05-30

## 2025-06-01 NOTE — PROGRESS NOTES
Name: Santos Santos      : 1970      MRN: [id number]  Encounter Provider: ERWIN Gallardo  Encounter Date: 2025   Encounter department: [facility] [place]  :  Assessment & Plan  Hypogonadism in male  Underwent low testosterone workup in , at that time total testosterone was 391, however prolactin was elevated at 15 so MRI was ordered and pt referred to endocrinology. He was found to have partially empty sella and treated with vitamin D for fatigue.  Pt notes he was on androgel in 30's and felt great, would like repeat low testosterone work up.   I discussed with the pt treatment for TRT is followed by 2 low readings of testosterone 2 weeks apart from each other. Discussed options for TRT including androgel, IM replacement, and testopel.   Discussed possible side effects and risks including cardiovascular risks, worsening sleep apnea, etc. Stressed importance of follow up labs.    Orders:    Follicle stimulating hormone; Future    Luteinizing hormone; Future    Prolactin; Future    Testosterone; Future    TSH, 3rd generation; Future    CBC and differential; Future        History of Present Illness   Santos Santos is a 54 y.o. male who presents for follow up of fatigue and hypogonadism. Pt was last seen in the office 23. At that time desired TRT, however had elevated prolactin so he was referred to endocrinology. He had full work up including brain MRI and repeat testosterone and prolactin which had normalized. His testosterone level at the time was 391.He does have a history of TBI and empty sella. He underwent pituitary workup with endocrinology which was normal. He was started on Vitamin D replacement for his fatigue.   Today he presents with complaints of fatigue. He does have sleep apnea and wears CPAP nightly. He denies any LUTS, low libido, or erectile dysfunction. He does state that he has used cialis in the past for a degree of erectile dysfunction, however he  notes that it gave him a really bad headache and he stopped using. He now has usable erections, although they are not what they once were. He does note that he recently did gain weight this year and has trouble losing it. He is also taking Vitamin D daily to help with fatigue but he is still always tired. He does have some weak stream and nocturia x 2 however he has had this for years and it is not currently bothersome to him. He does complete yearly PSA and last was 11 months ago and was 0.7. This has been stable since 2016. He denies any family history of prostate cancer.     Review of Systems   Constitutional:  Positive for fatigue. Negative for chills and fever.   Gastrointestinal:  Negative for abdominal pain.   Genitourinary:  Negative for difficulty urinating, dysuria, flank pain, frequency, hematuria and urgency.        Hesitancy and nocturia           Objective   There were no vitals taken for this visit.    Physical Exam  Vitals reviewed.   Constitutional:       General: He is not in acute distress.     Appearance: Normal appearance. He is normal weight. He is not toxic-appearing.   HENT:      Head: Normocephalic and atraumatic.     Eyes:      Conjunctiva/sclera: Conjunctivae normal.       Cardiovascular:      Rate and Rhythm: Normal rate.   Pulmonary:      Effort: Pulmonary effort is normal.   Abdominal:      Palpations: Abdomen is soft.     Musculoskeletal:         General: Normal range of motion.      Cervical back: Normal range of motion.     Skin:     General: Skin is warm and dry.     Neurological:      Mental Status: He is alert and oriented to person, place, and time.     Psychiatric:         Mood and Affect: Mood normal.         Behavior: Behavior normal.           Results   Lab Results   Component Value Date    PSA 0.749 06/12/2024    PSA 0.6 02/02/2023    PSA 0.8 03/05/2022     Lab Results   Component Value Date    CALCIUM 9.1 06/12/2024    K 3.9 06/12/2024    CO2 25 06/12/2024      06/12/2024    BUN 18 06/12/2024    CREATININE 1.06 06/12/2024     Lab Results   Component Value Date    WBC 5.35 06/12/2024    HGB 15.0 06/12/2024    HCT 43.6 06/12/2024    MCV 88 06/12/2024     06/12/2024       Office Urine Dip  No results found for this or any previous visit (from the past hour).

## 2025-06-02 ENCOUNTER — OFFICE VISIT (OUTPATIENT)
Dept: UROLOGY | Facility: MEDICAL CENTER | Age: 55
End: 2025-06-02

## 2025-06-02 VITALS
BODY MASS INDEX: 39.2 KG/M2 | WEIGHT: 280 LBS | DIASTOLIC BLOOD PRESSURE: 80 MMHG | SYSTOLIC BLOOD PRESSURE: 140 MMHG | HEIGHT: 71 IN | HEART RATE: 75 BPM | OXYGEN SATURATION: 94 %

## 2025-06-02 DIAGNOSIS — E29.1 HYPOGONADISM IN MALE: Primary | ICD-10-CM

## 2025-06-02 RX ORDER — VITAMIN B COMPLEX
1000 TABLET ORAL DAILY
COMMUNITY

## 2025-06-02 RX ORDER — MULTIVITAMIN WITH IRON
500 TABLET ORAL DAILY
COMMUNITY

## 2025-06-30 ENCOUNTER — TELEPHONE (OUTPATIENT)
Dept: UROLOGY | Facility: MEDICAL CENTER | Age: 55
End: 2025-06-30

## 2025-06-30 NOTE — TELEPHONE ENCOUNTER
I left a message for the patient asking them if they would like to reschedule their appointment for 7/1. The lab work that was going to be reviewed at the appointment has not been completed. I left the main number for them to return the call and reschedule.